# Patient Record
Sex: FEMALE | Race: WHITE | Employment: OTHER | URBAN - METROPOLITAN AREA
[De-identification: names, ages, dates, MRNs, and addresses within clinical notes are randomized per-mention and may not be internally consistent; named-entity substitution may affect disease eponyms.]

---

## 2018-02-02 ENCOUNTER — HOSPITAL ENCOUNTER (OUTPATIENT)
Dept: GENERAL RADIOLOGY | Age: 58
Discharge: HOME | End: 2018-02-02
Payer: MEDICARE

## 2018-02-02 DIAGNOSIS — F17.200 TOBACCO USE DISORDER: ICD-10-CM

## 2018-02-02 PROCEDURE — 71046 X-RAY EXAM CHEST 2 VIEWS: CPT

## 2019-11-22 ENCOUNTER — HOSPITAL ENCOUNTER (OUTPATIENT)
Dept: GENERAL RADIOLOGY | Age: 59
Discharge: HOME | End: 2019-11-22
Payer: MEDICARE

## 2019-11-22 DIAGNOSIS — J45.41 MODERATE PERSISTENT ASTHMA WITH EXACERBATION: ICD-10-CM

## 2019-11-22 PROCEDURE — 71046 X-RAY EXAM CHEST 2 VIEWS: CPT

## 2019-12-12 ENCOUNTER — HOSPITAL ENCOUNTER (OUTPATIENT)
Dept: NON INVASIVE DIAGNOSTICS | Age: 59
Discharge: HOME | End: 2019-12-12
Attending: NURSE PRACTITIONER
Payer: MEDICARE

## 2019-12-12 ENCOUNTER — HOSPITAL ENCOUNTER (OUTPATIENT)
Dept: CT IMAGING | Age: 59
Discharge: HOME | End: 2019-12-12
Attending: NURSE PRACTITIONER
Payer: MEDICARE

## 2019-12-12 VITALS
BODY MASS INDEX: 30.21 KG/M2 | HEIGHT: 61 IN | SYSTOLIC BLOOD PRESSURE: 128 MMHG | DIASTOLIC BLOOD PRESSURE: 60 MMHG | WEIGHT: 160 LBS

## 2019-12-12 DIAGNOSIS — R06.09 DYSPNEA ON MINIMAL EXERTION: ICD-10-CM

## 2019-12-12 LAB
CREAT UR-MCNC: 1.4 MG/DL (ref 0.6–1.3)
ECHO AO ROOT DIAM: 2.98 CM
ECHO LA AREA 4C: 19.8 CM2
ECHO LA VOL 2C: 30.47 ML (ref 22–52)
ECHO LA VOL 4C: 52.75 ML (ref 22–52)
ECHO LA VOL BP: 47.02 ML (ref 22–52)
ECHO LA VOL/BSA BIPLANE: 27.37 ML/M2 (ref 16–28)
ECHO LA VOLUME INDEX A2C: 17.73 ML/M2 (ref 16–28)
ECHO LA VOLUME INDEX A4C: 30.7 ML/M2 (ref 16–28)
ECHO LV EDV TEICHHOLZ: 0.66 ML
ECHO LV ESV TEICHHOLZ: 0.23 ML
ECHO LV INTERNAL DIMENSION DIASTOLIC: 4.71 CM (ref 3.9–5.3)
ECHO LV INTERNAL DIMENSION SYSTOLIC: 3.03 CM
ECHO LV IVSD: 1.28 CM (ref 0.6–0.9)
ECHO LV MASS 2D: 272.5 G (ref 67–162)
ECHO LV MASS INDEX 2D: 158.6 G/M2 (ref 43–95)
ECHO LV POSTERIOR WALL DIASTOLIC: 1.25 CM (ref 0.6–0.9)
ECHO LVOT DIAM: 1.92 CM
ECHO LVOT PEAK GRADIENT: 4.5 MMHG
ECHO LVOT PEAK VELOCITY: 106 CM/S
ECHO LVOT VTI: 22.24 CM
ECHO MV A VELOCITY: 64.62 CM/S
ECHO MV E DECELERATION TIME (DT): 161.6 MS
ECHO MV E VELOCITY: 76.7 CM/S
ECHO MV E/A RATIO: 1.19
LVFS 2D: 35.74 %
LVOT MG: 2.25 MMHG
LVOT MV: 0.7 CM/S
LVSV (TEICH): 37.98 ML
MV DEC SLOPE: 4.75

## 2019-12-12 PROCEDURE — 82565 ASSAY OF CREATININE: CPT

## 2019-12-12 PROCEDURE — 71260 CT THORAX DX C+: CPT

## 2019-12-12 PROCEDURE — 93306 TTE W/DOPPLER COMPLETE: CPT

## 2019-12-12 PROCEDURE — 74011636320 HC RX REV CODE- 636/320

## 2019-12-12 PROCEDURE — 71270 CT THORAX DX C-/C+: CPT

## 2019-12-12 RX ADMIN — IOPAMIDOL 60 ML: 612 INJECTION, SOLUTION INTRAVENOUS at 13:40

## 2019-12-16 ENCOUNTER — HOSPITAL ENCOUNTER (OUTPATIENT)
Dept: RESPIRATORY THERAPY | Age: 59
Discharge: HOME | End: 2019-12-16
Attending: NURSE PRACTITIONER
Payer: MEDICARE

## 2019-12-16 DIAGNOSIS — R06.09 DYSPNEA ON MINIMAL EXERTION: ICD-10-CM

## 2019-12-16 PROCEDURE — 94060 EVALUATION OF WHEEZING: CPT

## 2019-12-16 PROCEDURE — 94726 PLETHYSMOGRAPHY LUNG VOLUMES: CPT

## 2019-12-16 PROCEDURE — 94729 DIFFUSING CAPACITY: CPT

## 2020-03-16 RX ORDER — MECLIZINE HYDROCHLORIDE 25 MG/1
25 TABLET ORAL
COMMUNITY
End: 2021-06-24

## 2020-03-16 RX ORDER — PREDNISONE 10 MG/1
TABLET ORAL
COMMUNITY
End: 2020-10-06 | Stop reason: ALTCHOICE

## 2020-03-16 RX ORDER — QUETIAPINE FUMARATE 100 MG/1
200 TABLET, FILM COATED ORAL DAILY
COMMUNITY
End: 2021-06-24

## 2020-03-16 RX ORDER — TRAZODONE HYDROCHLORIDE 100 MG/1
200 TABLET ORAL
COMMUNITY
End: 2021-06-24

## 2020-03-16 RX ORDER — VENLAFAXINE 75 MG/1
150 TABLET ORAL DAILY
COMMUNITY
End: 2021-06-24

## 2020-03-16 RX ORDER — ALBUTEROL SULFATE 90 UG/1
2 AEROSOL, METERED RESPIRATORY (INHALATION)
COMMUNITY
End: 2021-06-24

## 2020-03-16 RX ORDER — BUDESONIDE AND FORMOTEROL FUMARATE DIHYDRATE 160; 4.5 UG/1; UG/1
2 AEROSOL RESPIRATORY (INHALATION) DAILY
COMMUNITY
End: 2021-06-24

## 2020-03-16 RX ORDER — DULOXETIN HYDROCHLORIDE 60 MG/1
60 CAPSULE, DELAYED RELEASE ORAL DAILY
COMMUNITY
End: 2021-06-24

## 2020-03-16 RX ORDER — ATORVASTATIN CALCIUM 80 MG/1
80 TABLET, FILM COATED ORAL DAILY
COMMUNITY
Start: 2017-07-14 | End: 2021-06-24

## 2020-03-16 RX ORDER — LAMOTRIGINE 200 MG/1
200 TABLET ORAL 2 TIMES DAILY
Status: ON HOLD | COMMUNITY
End: 2021-06-24 | Stop reason: SDUPTHER

## 2020-03-17 ENCOUNTER — OFFICE VISIT (OUTPATIENT)
Dept: PULMONOLOGY | Age: 60
End: 2020-03-17

## 2020-03-17 VITALS
TEMPERATURE: 98.2 F | HEIGHT: 61 IN | WEIGHT: 190 LBS | BODY MASS INDEX: 35.87 KG/M2 | DIASTOLIC BLOOD PRESSURE: 70 MMHG | RESPIRATION RATE: 18 BRPM | HEART RATE: 76 BPM | SYSTOLIC BLOOD PRESSURE: 110 MMHG | OXYGEN SATURATION: 93 %

## 2020-03-17 DIAGNOSIS — E66.01 SEVERE OBESITY (HCC): ICD-10-CM

## 2020-03-17 NOTE — LETTER
3/23/20 Patient: Guy Fam YOB: 1960 Date of Visit: 3/17/2020 Carey Tobar NP 
[address] [place] [address] [address] [place] [address] [place] [address] VIA Facsimile: [phone] Dear Carye Tobar NP, Thank you for referring Ms. Vasyl Orozco to [address] [place] [address] for evaluation. My notes for this consultation are attached. If you have questions, please do not hesitate to call me. I look forward to following your patient along with you.  
 
 
Sincerely, 
 
Anny Elizabeth MD

## 2020-03-17 NOTE — PROGRESS NOTES
[facility]  Pulmonary, Critical Care, and Sleep Medicine      Pulmonary Office Initial Consultation    Name: Sylvia Espinoza     : 1960     Date: 3/17/2020        Subjective:   Patient has been referred for evaluation of: Chronic cough and shortness of breath. Patient is a 61 y.o. female who states that for the past 6 months she has been experiencing symptoms of shortness of breath both at rest and with walking. Occasionally she has even found difficulty getting up in her sleep and going to the bathroom because she gets short of breath. Previously she was able to walk 1 mile now she is barely able to walk half a mile and has to pace herself and rest.  In addition she has been having cough which she describes as productive of white mucus to occasionally dry. The cough started around the same time as the shortness of breath and is intermittent but  consistently on a daily basis. Cough seems to get worse when she lays down. She also has had some hoarseness and wheezing associated with above symptoms. She reports that only change in her medical condition has been changing her dose of Seroquel and Paxil which have been increased  She denies chest pain, fever, chills, night sweats dyspepsia, reflux. Denies any pedal edema  Denies any dysphagia  Denies any change in her weight  Denies postnasal drip  Denies being diagnosed with sleep apnea  Comorbid conditions include- DM, HTN, diagnosis of bipolar affective disorder, stage III chronic kidney disease, hypothyroidism    Smoking status: Current everyday smoker smoking 1 pack every 3 days    No e-cigarettes or vaping.   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, coal mining, textile industry work, construction work       Review of data: I have reviewed records from her primary care physician-office notes, results of previous testing including echocardiogram    Past Medical History:   Diagnosis Date    Asthma, chronic, moderate persistent, with acute exacerbation     Bipolar 1 disorder, manic, mild ([address]     CAD (coronary artery disease)     Controlled type 2 diabetes mellitus without complication, without long-term current use of insulin ([address]     Diabetes ([address]     Hyperlipidemia     Hypertension     Hypothyroidism     Liver disease        Past Surgical History:   Procedure Laterality Date    HX APPENDECTOMY  10/1969    HX BACK SURGERY       neck& back surgery    HX CERVICAL LAMINECTOMY      HX CYST REMOVAL Left     Baker's    HX HYSTERECTOMY Bilateral 10/1990    HX KNEE ARTHROSCOPY Left     torn cartilage x 2       Social History     Socioeconomic History    Marital status:      Spouse name: Not on file    Number of children: Not on file    Years of education: Not on file    Highest education level: Not on file   Tobacco Use    Smoking status: Current Every Day Smoker     Packs/day: 1.00     Types: Cigarettes     Start date: 6/17/1974   Substance and Sexual Activity    Alcohol use: Never     Frequency: Never    Drug use: Not Currently     Types: Marijuana     Comment: as a teenager       Family History   Problem Relation Age of Onset    Cancer Mother     Heart Disease Father        Allergies   Allergen Reactions    Codeine Nausea and Vomiting    Other Medication Other (comments)     Nicotine Patches - Serious \"burn like\" irritation on skin    Tylox [Oxycodone-Acetaminophen] Nausea and Vomiting    Celexa [Citalopram] Unknown (comments)     violent    Flu Vaccine Qs2014-15(36mo,Up) Nausea Only       .   Current Outpatient Medications   Medication Sig Dispense Refill    albuterol (PROVENTIL HFA, VENTOLIN HFA, PROAIR HFA) 90 mcg/actuation inhaler Take 2 Puffs by inhalation.  aspirin-calcium carbonate 81 mg-300 mg calcium(777 mg) tab Take 81 mg by mouth.  atorvastatin (LIPITOR) 80 mg tablet Take 80 mg by mouth.  glucose blood VI test strips (ASCENSIA AUTODISC VI, ONE TOUCH ULTRA TEST VI) strip Test once a day for DX E11.9      budesonide-formoteroL (SYMBICORT) 160-4.5 mcg/actuation HFAA Take 2 Puffs by inhalation.  DULoxetine (CYMBALTA) 60 mg capsule Take 60 mg by mouth.  lamoTRIgine (LaMICtal) 200 mg tablet Take 200 mg by mouth two (2) times a day.  meclizine (ANTIVERT) 25 mg tablet Take 25 mg by mouth.  QUEtiapine (SEROquel) 100 mg tablet Take 100 mg by mouth daily.  traZODone (DESYREL) 100 mg tablet Take 200 mg by mouth nightly.  venlafaxine (EFFEXOR) 75 mg tablet Take 75 mg by mouth daily.  levothyroxine (SYNTHROID) 75 mcg tablet Take 75 mcg by mouth Daily (before breakfast). Indications: HYPOTHYROIDISM      lisinopril (PRINIVIL, ZESTRIL) 5 mg tablet Take 5 mg by mouth daily. Indications: HYPERTENSION      lovastatin (MEVACOR) 10 mg tablet Take 10 mg by mouth daily (after breakfast). Indications: HYPERCHOLESTEROLEMIA      predniSONE (STERAPRED DS) 10 mg dose pack Take  by mouth.  cyclobenzaprine (FLEXERIL) 10 mg tablet Take 10 mg by mouth two (2) times a day. Indications:  MUSCLE SPASM       Review of Systems:  HEENT: No epistaxis, no nasal drainage, no difficulty in swallowing, no redness in eyes  Respiratory: as above  Cardiovascular: no chest pain, no palpitations, no chronic leg edema, no syncope  Gastrointestinal: no abd pain, no vomiting, no diarrhea, no bleeding symptoms  Genitourinary: No urinary symptoms or hematuria  Integument/breast: No ulcers or rashes  Musculoskeletal:Neg  Neurological: No focal weakness, no seizures, no headaches  Behvioral/Psych: No anxiety, no depression  Constitutional: No fever, no chills, no weight loss, no night sweats     Objective:     Visit Vitals  /70 (BP 1 Location: Left arm, BP Patient Position: Sitting)   Pulse 76   Temp 98.2 °F (36.8 °C)   Resp 18   Ht 5' 1\" (1.549 m)   Wt 86.2 kg (190 lb)   SpO2 93%   BMI 35.90 kg/m²        Physical Exam:   General: comfortable, no acute distress  HEENT: pupils reactive, sclera anicteric, EOM intact  Neck: No adenopathy or thyroid swelling, no lymphadenopathy or JVD, supple  CVS: S1S2 no murmurs  RS: Mod AE bilaterally, no tactile fremitus or egophony, no accessory muscle use  Abd: soft, non tender, no hepatosplenomegaly  Neuro: non focal, awake, alert  Extrm: no leg edema, clubbing or cyanosis  Skin: no rash    Data review:   Pertinent labs: CBC, BMP, LFT's      PFT:  12/18/2019-normal spirometry  Mild air trapping  Normal diffusion capacity    No results found for this or any previous visit. Imaging:  I have personally reviewed the patients radiographs and have reviewed the reports:  XR Results (most recent):  Results from Hospital Encounter encounter on 11/22/19   XR CHEST PA LAT    Narrative CHEST, PA AND LATERAL    CPT CODE: 50871    INDICATION: Above. Asthma exacerbation. COMPARISON: 2/2/2018. TECHNIQUE: PA and lateral chest radiographs are reviewed. FINDINGS:    The lungs appear clear without  evidence of focal pulmonary infiltrate,  pulmonary edema or pleural effusion. The cardiomediastinal contours are within  normal limits. Cervical spine fusion plate/screws again noted. Surgical clips  again project over the right upper quadrant medially. Impression IMPRESSION:    No evidence of acute pulmonary disease.          CT Results (most recent):  Results from Hospital Encounter encounter on 12/12/19   CT CHEST W CONT    Narrative EXAM:  CT Chest with Contrast.             CLINICAL INDICATION:  Persistent cough, dyspnea, dyspnea with minimal exertion. COMPARISON:  No prior CTs available. TECHNIQUE:      - Helically scanned volumetric axial sections of the chest are obtained  following IV contrast administration. Coronal and sagittal multiplanar  reconstruction images are generated for improved anatomic delineation.  - Contrast dose of 60 mL Isovue-300  - Radiation dose optimization techniques are utilized as appropriate to the  exam, with combination of automated exposure control, adjustment of the mA  and/or kV according to patient's size (Including appropriate matching for  site-specific examinations), or use of iterative reconstruction technique. FINDINGS:     Lungs:  No dominant lung mass or suspicious lung nodule is detected in both  lungs. No infiltrate or consolidation is identified. Pleura:  No significant pleural effusion is detected bilaterally. Mediastinum, Catie:  No adenopathy. Base of Neck, Axillae:  No adenopathy. Esophagus:  Mild esophageal dilation. Abdomen:  No acute abnormalities in the visualized portions of the upper  abdomen. Bones:  No acute findings. Impression IMPRESSION:              No acute airspace disease. No dominant mass or suspicious lung nodule. .     12/12/19   ECHO ADULT COMPLETE 12/12/2019 12/12/2019    Narrative · Left Ventricle: Normal cavity size and systolic function (ejection   fraction normal). Mild concentric hypertrophy. Estimated left ventricular   ejection fraction is 61 - 65%. No regional wall motion abnormality noted. Age-appropriate left ventricular diastolic function. · Mitral Valve: Mild mitral valve regurgitation is present.         Signed by: Marquez Finney MD       Patient Active Problem List   Diagnosis Code    Severe obesity ([facility] E66.01       IMPRESSION:   · Dyspnea on exertion-likely related to mild obstructive airway disease-chronic asthmatic bronchitis as evidenced by air trapping  · Moderate persistent asthma with exacerbation  · Hypothyroidism  · Bipolar affective disorder  · Obesity with deconditioning      RECOMMENDATIONS:   · Continue to optimize treatment-continue Symbicort with albuterol  · Will add Spiriva Respimat  · Discussed need for airway clearance measures with patient  · Will monitor response to above and make further recommendations for further treatment adjustments  · Patient has been instructed to pace herself and incrementally increase her exercise ability  · Complete cessation of cigarette smoking  · We will follow-up in 3 to 4 months     Health maintenance screens deferred to Primary care provider.      Casandra Orosco MD

## 2020-03-17 NOTE — PATIENT INSTRUCTIONS
Stopping Smoking: Care Instructions  Your Care Instructions  Cigarette smokers crave the nicotine in cigarettes. Giving it up is much harder than simply changing a habit. Your body has to stop craving the nicotine. It is hard to quit, but you can do it. There are many tools that people use to quit smoking. You may find that combining tools works best for you. There are several steps to quitting. First you get ready to quit. Then you get support to help you. After that, you learn new skills and behaviors to become a nonsmoker. For many people, a necessary step is getting and using medicine. Your doctor will help you set up the plan that best meets your needs. You may want to attend a smoking cessation program to help you quit smoking. When you choose a program, look for one that has proven success. Ask your doctor for ideas. You will greatly increase your chances of success if you take medicine as well as get counseling or join a cessation program.  Some of the changes you feel when you first quit tobacco are uncomfortable. Your body will miss the nicotine at first, and you may feel short-tempered and grumpy. You may have trouble sleeping or concentrating. Medicine can help you deal with these symptoms. You may struggle with changing your smoking habits and rituals. The last step is the tricky one: Be prepared for the smoking urge to continue for a time. This is a lot to deal with, but keep at it. You will feel better. Follow-up care is a key part of your treatment and safety. Be sure to make and go to all appointments, and call your doctor if you are having problems. It's also a good idea to know your test results and keep a list of the medicines you take. How can you care for yourself at home? · Ask your family, friends, and coworkers for support. You have a better chance of quitting if you have help and support.   · Join a support group, such as Nicotine Anonymous, for people who are trying to quit smoking. · Consider signing up for a smoking cessation program, such as the American Lung Association's Freedom from Smoking program.  · Get text messaging support. Go to the website at www.smokefree. gov to sign up for the [facility] program.  · Set a quit date. Pick your date carefully so that it is not right in the middle of a big deadline or stressful time. Once you quit, do not even take a puff. Get rid of all ashtrays and lighters after your last cigarette. Clean your house and your clothes so that they do not smell of smoke. · Learn how to be a nonsmoker. Think about ways you can avoid those things that make you reach for a cigarette. ? Avoid situations that put you at greatest risk for smoking. For some people, it is hard to have a drink with friends without smoking. For others, they might skip a coffee break with coworkers who smoke. ? Change your daily routine. Take a different route to work or eat a meal in a different place. · Cut down on stress. Calm yourself or release tension by doing an activity you enjoy, such as reading a book, taking a hot bath, or gardening. · Talk to your doctor or pharmacist about nicotine replacement therapy, which replaces the nicotine in your body. You still get nicotine but you do not use tobacco. Nicotine replacement products help you slowly reduce the amount of nicotine you need. These products come in several forms, many of them available over-the-counter:  ? Nicotine patches  ? Nicotine gum and lozenges  ? Nicotine inhaler  · Ask your doctor about bupropion (Wellbutrin) or varenicline (Chantix), which are prescription medicines. They do not contain nicotine. They help you by reducing withdrawal symptoms, such as stress and anxiety. · Some people find hypnosis, acupuncture, and massage helpful for ending the smoking habit. · Eat a healthy diet and get regular exercise. Having healthy habits will help your body move past its craving for nicotine.   · Be prepared to keep trying. Most people are not successful the first few times they try to quit. Do not get mad at yourself if you smoke again. Make a list of things you learned and think about when you want to try again, such as next week, next month, or next year. Where can you learn more? Go to http://roberto-jaycob.info/  Enter [id number] in the search box to learn more about \"Stopping Smoking: Care Instructions. \"  Current as of: July 4, 2019Content Version: 12.4  © 7751-7077 [employer]. Care instructions adapted under license by [employer] (which disclaims liability or warranty for this information). If you have questions about a medical condition or this instruction, always ask your healthcare professional. [address] any warranty or liability for your use of this information. COPD and Asthma: Care Instructions  Your Care Instructions    Some people who have chronic obstructive pulmonary disease (COPD) also have asthma. Both of these problems can damage your lungs. This makes it very important to control them. Asthma causes the airways that lead to the lungs to swell and become narrow. This makes it hard to breathe. You may wheeze or cough. If you have a bad attack, you may need emergency care. There are two parts to treating asthma. · Controlling asthma over the long term. · Treating attacks when they occur. You and your doctor can make an asthma treatment plan that will help. This plan tells you the medicines you take every day to reduce the swelling in your airways and prevent attacks. It also tells you what to do if you have an asthma attack. Follow-up care is a key part of your treatment and safety. Be sure to make and go to all appointments, and call your doctor if you are having problems. It's also a good idea to know your test results and keep a list of the medicines you take. How can you care for yourself at home?   To control asthma over the long term  Medicines  Controller medicines reduce swelling in your lungs. They also prevent asthma attacks. Take your controller medicine exactly as prescribed. Talk to your doctor if you have any problems with your medicine. · Inhaled corticosteroid is a common and effective controller medicine. Using it the right way can prevent or reduce most side effects. · Take your controller medicine every day, not just when you have symptoms. This helps prevent problems before they occur. · Always bring your asthma medicine with you when you travel. · Your doctor may prescribe long-acting medicine that combines a corticosteroid with a beta2-agonist. Follow your doctor's instructions exactly about how to take a long-acting medicine. Examples include:  ? Fluticasone and salmeterol (Advair). ? Budesonide and formoterol (Symbicort). · Do not depend on your controller medicines to stop an asthma attack that has already started. They do not work fast enough to help. · Your doctor may also prescribe anticholinergic inhalers. These include ipratropium (Atrovent) and tiotropium (Spiriva). Education  · Learn what sets off an asthma attack. Avoid these triggers when you can. Common triggers include smoke, air pollution, pollen, animal dander, colds, stress, and cold air. · Do not smoke. Smoking can make COPD and asthma worse. If you need help quitting, talk to your doctor about stop-smoking programs and medicines. These can increase your chances of quitting for good. · Check yourself for symptoms to know which step to follow in your action plan. Watch for things like being short of breath, having chest tightness, coughing, and wheezing. Also notice if symptoms wake you up at night or if you get tired quickly when you exercise. · You may want to learn how to use a peak flow meter. This measures how open your airways are. It may help you know when you will have an asthma attack.   To treat attacks when they occur  Use your asthma action plan when you have an attack. Your quick-relief medicine, such as albuterol, will stop an asthma attack. It relaxes the muscles that get tight around the airways. · Take your quick-relief medicine exactly as prescribed. Talk with your doctor if you have any problems with your medicine. · Keep this medicine with you at all times. · You may need to use this medicine before you exercise. If your doctor prescribed corticosteroid pills to use during an attack, take them as directed. They may take hours to work, but they may shorten the attack and help you breathe better. When should you call for help? Call 911 anytime you think you may need emergency care. For example, call if:    · You have severe trouble breathing.    Call your doctor now or seek immediate medical care if:    · You have new or worse shortness of breath.     · You are coughing more deeply or more often, especially if you notice more mucus or a change in the color of your mucus.     · You cough up blood.     · You have new or increased swelling in your legs or belly.     · You have a fever.     · You have used your quick-relief medicine but you are still short of breath.    Watch closely for changes in your health, and be sure to contact your doctor if you have any problems. Where can you learn more? Go to http://roberto-jaycob.info/  Enter A350 in the search box to learn more about \"COPD and Asthma: Care Instructions. \"  Current as of: June 9, 2019Content Version: 12.4  © 0606-4375 [employer]. Care instructions adapted under license by [employer] (which disclaims liability or warranty for this information). If you have questions about a medical condition or this instruction, always ask your healthcare professional. [place] [address] any warranty or liability for your use of this information.

## 2020-03-31 ENCOUNTER — TELEPHONE (OUTPATIENT)
Dept: PULMONOLOGY | Age: 60
End: 2020-03-31

## 2020-03-31 NOTE — TELEPHONE ENCOUNTER
Pt states she has not seen a lot of difference yet in adding Spirva. Was taken 1 1/2 weeks.  Pt needs rx to be sent in to [facility]

## 2020-09-30 PROBLEM — S61.218A: Status: ACTIVE | Noted: 2020-09-22

## 2020-09-30 PROBLEM — E11.9 DIET-CONTROLLED TYPE 2 DIABETES MELLITUS (HCC): Status: ACTIVE | Noted: 2019-12-01

## 2020-09-30 PROBLEM — N18.30 CHRONIC KIDNEY DISEASE, STAGE 3 (MODERATE): Status: ACTIVE | Noted: 2019-12-01

## 2020-09-30 PROBLEM — F17.200 SMOKER: Status: ACTIVE | Noted: 2020-01-06

## 2020-09-30 PROBLEM — E53.8 B12 DEFICIENCY: Status: ACTIVE | Noted: 2020-09-27

## 2020-09-30 PROBLEM — R29.6 FREQUENT FALLS: Status: ACTIVE | Noted: 2020-09-27

## 2020-09-30 PROBLEM — S63.259A: Status: ACTIVE | Noted: 2020-09-22

## 2020-09-30 RX ORDER — ROSUVASTATIN CALCIUM 20 MG/1
20 TABLET, COATED ORAL DAILY
COMMUNITY
Start: 2020-08-15 | End: 2021-06-24

## 2020-09-30 RX ORDER — LANOLIN ALCOHOL/MO/W.PET/CERES
1000 CREAM (GRAM) TOPICAL DAILY
COMMUNITY
Start: 2020-09-28 | End: 2021-06-24

## 2020-10-06 ENCOUNTER — OFFICE VISIT (OUTPATIENT)
Dept: PULMONOLOGY | Age: 60
End: 2020-10-06
Payer: MEDICARE

## 2020-10-06 VITALS
WEIGHT: 183.6 LBS | OXYGEN SATURATION: 95 % | RESPIRATION RATE: 18 BRPM | BODY MASS INDEX: 34.66 KG/M2 | DIASTOLIC BLOOD PRESSURE: 63 MMHG | HEART RATE: 79 BPM | HEIGHT: 61 IN | TEMPERATURE: 98 F | SYSTOLIC BLOOD PRESSURE: 129 MMHG

## 2020-10-06 DIAGNOSIS — J45.20 MILD INTERMITTENT ASTHMATIC BRONCHITIS WITHOUT COMPLICATION: Primary | ICD-10-CM

## 2020-10-06 DIAGNOSIS — F17.200 SMOKER: ICD-10-CM

## 2020-10-06 DIAGNOSIS — Z72.0 TOBACCO USE: ICD-10-CM

## 2020-10-06 PROBLEM — J45.909 ASTHMATIC BRONCHITIS WITHOUT COMPLICATION: Status: ACTIVE | Noted: 2020-10-06

## 2020-10-06 PROCEDURE — G8752 SYS BP LESS 140: HCPCS | Performed by: INTERNAL MEDICINE

## 2020-10-06 PROCEDURE — G9899 SCRN MAM PERF RSLTS DOC: HCPCS | Performed by: INTERNAL MEDICINE

## 2020-10-06 PROCEDURE — G8427 DOCREV CUR MEDS BY ELIG CLIN: HCPCS | Performed by: INTERNAL MEDICINE

## 2020-10-06 PROCEDURE — 99214 OFFICE O/P EST MOD 30 MIN: CPT | Performed by: INTERNAL MEDICINE

## 2020-10-06 PROCEDURE — 3017F COLORECTAL CA SCREEN DOC REV: CPT | Performed by: INTERNAL MEDICINE

## 2020-10-06 PROCEDURE — G8432 DEP SCR NOT DOC, RNG: HCPCS | Performed by: INTERNAL MEDICINE

## 2020-10-06 PROCEDURE — G8754 DIAS BP LESS 90: HCPCS | Performed by: INTERNAL MEDICINE

## 2020-10-06 PROCEDURE — G8417 CALC BMI ABV UP PARAM F/U: HCPCS | Performed by: INTERNAL MEDICINE

## 2020-10-06 RX ORDER — ALBUTEROL SULFATE 90 UG/1
1 AEROSOL, METERED RESPIRATORY (INHALATION)
Qty: 1 INHALER | Refills: 3 | Status: SHIPPED | OUTPATIENT
Start: 2020-10-06

## 2020-10-06 RX ORDER — CLINDAMYCIN HYDROCHLORIDE 300 MG/1
300 CAPSULE ORAL 4 TIMES DAILY
COMMUNITY
Start: 2020-10-03 | End: 2020-10-13

## 2020-10-06 NOTE — LETTER
10/6/20 Patient: Dimitrios Bo YOB: 1960 Date of Visit: 10/6/2020 Sukhwinder Velásquez NP 
[address] [place] [address] [address] [place] [id number] VIA Facsimile: [phone] Dear Sukhwinder Velásquez NP, Thank you for referring Ms. Royal Aguilar to [address] [place] [address] for evaluation. My notes for this consultation are attached. If you have questions, please do not hesitate to call me. I look forward to following your patient along with you.  
 
 
Sincerely, 
 
Camelia Ronquillo MD

## 2020-10-06 NOTE — PROGRESS NOTES
Mirza Velazquez presents today for   Chief Complaint   Patient presents with    Asthma     follow up form 3/17/2020    Cough    Breathing Problem     MENDEZ    Results     Echo  9/13/2020 Alonso Mahoney)       Is someone accompanying this pt? No    Is the patient using any DME equipment during OV? No    -DME Company N/A    Depression Screening:  3 most recent PHQ Screens 10/6/2020   PHQ Not Done Active Diagnosis of Depression or Bipolar Disorder       Learning Assessment:  Learning Assessment 3/17/2020   PRIMARY LEARNER Patient   HIGHEST LEVEL OF EDUCATION - PRIMARY LEARNER  > 4 YEARS OF COLLEGE   BARRIERS PRIMARY LEARNER NONE   CO-LEARNER CAREGIVER No   PRIMARY LANGUAGE ENGLISH    NEED No   LEARNER PREFERENCE PRIMARY DEMONSTRATION     LISTENING   ANSWERED BY patient   RELATIONSHIP SELF       Abuse Screening:  No flowsheet data found. Fall Risk  No flowsheet data found. Coordination of Care:  1. Have you been to the ER, urgent care clinic since your last visit? Hospitalized since your last visit? Yes; [facility]: [facility], When: 5/17/2020-Vomiting, 9/22-9/27/2020-hypothyroidism, fall & hand laceration & 10/3/2020-infected lip laceation    2. Have you seen or consulted any other health care providers outside of the [address] [place] [address] since your last visit? Include any pap smears or colon screening. Yes.  NP Natalio Todd, PCP

## 2020-10-06 NOTE — PROGRESS NOTES
[facility]  Pulmonary, Critical Care, and Sleep Medicine      Pulmonary Office visit    Name: Tara Franco     : 1960     Date: 10/6/2020        Subjective:   Patient has been referred for evaluation of: Chronic cough and shortness of breath. 10/06/20   Patient states that she continues to have persistent cough-usually dry all day long. She admits to smoking one third to half pack of cigarettes per day. She has been using Symbicort and Spiriva daily. Asking for a refill on albuterol. More recently she has had some problems with her lower extremities getting weak and had a thoracic spine MRI which showed T6-T7 herniated disc. In addition she fell and has lacerated and hurt her hand and is currently in need for a tendon repair surgery. She denies any fever chills night sweats  Denies any chest pain  Has not had any COVID exposure that she knows of. HPI  Patient is a 61 y.o. female who states that for the past 6 months she has been experiencing symptoms of shortness of breath both at rest and with walking. Occasionally she has even found difficulty getting up in her sleep and going to the bathroom because she gets short of breath. Previously she was able to walk 1 mile now she is barely able to walk half a mile and has to pace herself and rest.  In addition she has been having cough which she describes as productive of white mucus to occasionally dry. The cough started around the same time as the shortness of breath and is intermittent but  consistently on a daily basis. Cough seems to get worse when she lays down. She also has had some hoarseness and wheezing associated with above symptoms. She reports that only change in her medical condition has been changing her dose of Seroquel and Paxil which have been increased  She denies chest pain, fever, chills, night sweats dyspepsia, reflux.   Denies any pedal edema  Denies any dysphagia  Denies any change in her weight  Denies postnasal drip  Denies being diagnosed with sleep apnea  Comorbid conditions include- DM, HTN, diagnosis of bipolar affective disorder, stage III chronic kidney disease, hypothyroidism    Smoking status: Current everyday smoker smoking 1 pack every 3 days    No e-cigarettes or vaping.   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, coal mining, textile industry work, construction work       Review of data: I have reviewed records from her primary care physician-office notes, results of previous testing including echocardiogram    Past Medical History:   Diagnosis Date    Asthma, chronic, moderate persistent, with acute exacerbation     Asthmatic bronchitis without complication 67/8/7245    Bipolar 1 disorder, manic, mild ([address]     CAD (coronary artery disease)     Controlled type 2 diabetes mellitus without complication, without long-term current use of insulin ([facility] [address]     Diabetes ([facility] [address]     Hyperlipidemia     Hypertension     Hypothyroidism     Liver disease        Past Surgical History:   Procedure Laterality Date    HX APPENDECTOMY  10/1969    HX BACK SURGERY       neck& back surgery    HX CERVICAL LAMINECTOMY      HX CYST REMOVAL Left     [facility]    HX HYSTERECTOMY Bilateral 10/1990    HX KNEE ARTHROSCOPY Left     torn cartilage x 2     Allergies   Allergen Reactions    Codeine Nausea and Vomiting    Other Medication Other (comments)     Nicotine Patches - Serious \"burn like\" irritation on skin    Tylox [Oxycodone-Acetaminophen] Nausea and Vomiting    Celexa [Citalopram] Unknown (comments) violent    Flu Vaccine Qs2014-15(36mo,Up) Nausea Only     . Current Outpatient Medications   Medication Sig Dispense Refill    clindamycin (CLEOCIN) 300 mg capsule Take 300 mg by mouth four (4) times daily.  rosuvastatin (CRESTOR) 20 mg tablet Take 20 mg by mouth daily.  cyanocobalamin 1,000 mcg tablet Take 1,000 mcg by mouth daily.  tiotropium bromide (Spiriva Respimat) 2.5 mcg/actuation inhaler Take 2 Puffs by inhalation daily. 1 Inhaler 5    albuterol (PROVENTIL HFA, VENTOLIN HFA, PROAIR HFA) 90 mcg/actuation inhaler Take 2 Puffs by inhalation every four (4) hours as needed.  aspirin-calcium carbonate 81 mg-300 mg calcium(777 mg) tab Take 81 mg by mouth daily.  atorvastatin (LIPITOR) 80 mg tablet Take 80 mg by mouth daily.  glucose blood VI test strips (ASCENSIA AUTODISC VI, ONE TOUCH ULTRA TEST VI) strip Test once a day for DX E11.9      DULoxetine (CYMBALTA) 60 mg capsule Take 60 mg by mouth daily.  lamoTRIgine (LaMICtal) 200 mg tablet Take 200 mg by mouth two (2) times a day.  meclizine (ANTIVERT) 25 mg tablet Take 25 mg by mouth daily as needed.  QUEtiapine (SEROquel) 100 mg tablet Take 200 mg by mouth daily.  traZODone (DESYREL) 100 mg tablet Take 200 mg by mouth nightly.  venlafaxine (EFFEXOR) 75 mg tablet Take 75 mg by mouth daily.  levothyroxine (SYNTHROID) 75 mcg tablet Take 75 mcg by mouth Daily (before breakfast). Indications: HYPOTHYROIDISM      lovastatin (MEVACOR) 10 mg tablet Take 10 mg by mouth daily (after breakfast). Indications: HYPERCHOLESTEROLEMIA      budesonide-formoteroL (SYMBICORT) 160-4.5 mcg/actuation HFAA Take 2 Puffs by inhalation.  cyclobenzaprine (FLEXERIL) 10 mg tablet Take 10 mg by mouth two (2) times a day. Indications: MUSCLE SPASM      lisinopril (PRINIVIL, ZESTRIL) 5 mg tablet Take 5 mg by mouth daily.     Indications: HYPERTENSION       Review of Systems:  HEENT: No epistaxis, no nasal drainage, no difficulty in swallowing, no redness in eyes  Respiratory: as above  Cardiovascular: no chest pain, no palpitations, no chronic leg edema, no syncope  Gastrointestinal: no abd pain, no vomiting, no diarrhea, no bleeding symptoms  Genitourinary: No urinary symptoms or hematuria  Integument/breast: No ulcers or rashes  Musculoskeletal:Neg  Neurological: No focal weakness, no seizures, no headaches  Behvioral/Psych: No anxiety, no depression  Constitutional: No fever, no chills, no weight loss, no night sweats     Objective:     Visit Vitals  /63 (BP 1 Location: Right arm, BP Patient Position: Sitting)   Pulse 79   Temp 98 °F (36.7 °C) (Oral)   Resp 18   Ht 5' 1\" (1.549 m)   Wt 83.3 kg (183 lb 9.6 oz)   SpO2 95%   BMI 34.69 kg/m²        Physical Exam:   General: comfortable, no acute distress  HEENT: pupils reactive, sclera anicteric, EOM intact  Neck: No adenopathy or thyroid swelling, no lymphadenopathy or JVD, supple  CVS: S1S2 no murmurs  RS: Mod AE bilaterally, no tactile fremitus or egophony, no accessory muscle use  Abd: soft, non tender, no hepatosplenomegaly  Neuro: non focal, awake, alert  Extrm: no leg edema, clubbing or cyanosis  Skin: no rash    Data review:   Pertinent labs: CBC, BMP, LFT's      PFT:  12/18/2019-normal spirometry  Mild air trapping  Normal diffusion capacity    No results found for this or any previous visit. Imaging:  I have personally reviewed the patients radiographs and have reviewed the reports:  XR Results (most recent):  Results from Hospital Encounter encounter on 11/22/19   XR CHEST PA LAT    Narrative CHEST, PA AND LATERAL    CPT CODE: 16523    INDICATION: Above. Asthma exacerbation. COMPARISON: 2/2/2018. TECHNIQUE: PA and lateral chest radiographs are reviewed. FINDINGS:    The lungs appear clear without  evidence of focal pulmonary infiltrate,  pulmonary edema or pleural effusion. The cardiomediastinal contours are within  normal limits. Cervical spine fusion plate/screws again noted. Surgical clips  again project over the right upper quadrant medially. Impression IMPRESSION:    No evidence of acute pulmonary disease. CT Results (most recent):  Results from Hospital Encounter encounter on 12/12/19   CT CHEST W CONT    Narrative EXAM:  CT Chest with Contrast.             CLINICAL INDICATION:  Persistent cough, dyspnea, dyspnea with minimal exertion. COMPARISON:  No prior CTs available. TECHNIQUE:      - Helically scanned volumetric axial sections of the chest are obtained  following IV contrast administration. Coronal and sagittal multiplanar  reconstruction images are generated for improved anatomic delineation.  - Contrast dose of 60 mL Isovue-300  - Radiation dose optimization techniques are utilized as appropriate to the  exam, with combination of automated exposure control, adjustment of the mA  and/or kV according to patient's size (Including appropriate matching for  site-specific examinations), or use of iterative reconstruction technique. FINDINGS:     Lungs:  No dominant lung mass or suspicious lung nodule is detected in both  lungs. No infiltrate or consolidation is identified. Pleura:  No significant pleural effusion is detected bilaterally. Mediastinum, Catie:  No adenopathy. Base of Neck, Axillae:  No adenopathy. Esophagus:  Mild esophageal dilation. Abdomen:  No acute abnormalities in the visualized portions of the upper  abdomen. Bones:  No acute findings. Impression IMPRESSION:              No acute airspace disease. No dominant mass or suspicious lung nodule. .   MR THORACIC SPINE W/O CONTRAST9/27/2020  [facility]  Result Impression   1. 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 No prominent cord distention. 3-6 month follow-up examination might be considered.   2. Disc protrusions consistent with herniated discs at T6-T7 and T8-T9 as described above. There is very mild cord distortion at T8-T9 without cord signal change, this would not with complete confidence correlate with a facet myelopathy. 3. No additional high-grade stenosis, intrinsic spinal cord lesion or mass, no additional abnormality that would otherwise correlate with a thoracic myelopathy. 12/12/19   ECHO ADULT COMPLETE 12/12/2019 12/12/2019    Narrative · Left Ventricle: Normal cavity size and systolic function (ejection   fraction normal). Mild concentric hypertrophy. Estimated left ventricular   ejection fraction is 61 - 65%. No regional wall motion abnormality noted. Age-appropriate left ventricular diastolic function. · Mitral Valve: Mild mitral valve regurgitation is present.         Signed by: Fannie Gee MD       Patient Active Problem List   Diagnosis Code    Severe obesity ([address] E66.01    Chronic kidney disease, stage 3 (moderate) N18.30    Diet-controlled type 2 diabetes mellitus ([address] E11.9    DM (diabetes mellitus) ([address] E11.9    Dry eyes H04.123    Glaucoma suspect H40.009    Hypertension I10    Hypothyroidism E03.9    Smoker F17.200    Tobacco use Z72.0    B12 deficiency E53.8    Dislocation, finger, initial encounter S63.259A    Frequent falls R29.6    Laceration of little finger without foreign body without damage to nail S61.218A    Asthmatic bronchitis without complication C82.017       IMPRESSION:   · Dyspnea on exertion and chronic persistent cough-likely related to mild obstructive airway disease-chronic asthmatic bronchitis as evidenced by air trapping in a patient who is a current every day smoker  · Moderate persistent asthma   · Hypothyroidism  · Bipolar affective disorder  · Obesity with deconditioning  · Herniated disks T6-7 on MRI thoracic spine 9/27/2020      RECOMMENDATIONS:   · Continue to optimize treatment-continue Symbicort , Spiriva Respimat PRN albuterol  · Discussed need for complete cessation of cigarette smoking  · Discussed need for airway clearance measures with patient  · Will monitor response to above and make further recommendations for further treatment adjustments  · Patient has been instructed to pace herself and incrementally increase her exercise ability  · Preventive vaccinations recommended-patient states she is allergic to flu shot(experienced nausea)  · We will follow-up in 3 to 4 months     Health maintenance screens deferred to Primary care provider.      Rachael Sanz MD

## 2020-10-14 ENCOUNTER — OFFICE VISIT (OUTPATIENT)
Dept: ORTHOPEDIC SURGERY | Age: 60
End: 2020-10-14
Payer: MEDICARE

## 2020-10-14 VITALS
WEIGHT: 185.6 LBS | BODY MASS INDEX: 35.04 KG/M2 | SYSTOLIC BLOOD PRESSURE: 132 MMHG | DIASTOLIC BLOOD PRESSURE: 57 MMHG | HEART RATE: 95 BPM | TEMPERATURE: 97.2 F | HEIGHT: 61 IN | RESPIRATION RATE: 15 BRPM

## 2020-10-14 DIAGNOSIS — M25.641 STIFFNESS OF RIGHT HAND JOINT: ICD-10-CM

## 2020-10-14 DIAGNOSIS — M79.641 RIGHT HAND PAIN: ICD-10-CM

## 2020-10-14 DIAGNOSIS — S63.266S: Primary | ICD-10-CM

## 2020-10-14 PROCEDURE — G9899 SCRN MAM PERF RSLTS DOC: HCPCS | Performed by: ORTHOPAEDIC SURGERY

## 2020-10-14 PROCEDURE — 3017F COLORECTAL CA SCREEN DOC REV: CPT | Performed by: ORTHOPAEDIC SURGERY

## 2020-10-14 PROCEDURE — G8417 CALC BMI ABV UP PARAM F/U: HCPCS | Performed by: ORTHOPAEDIC SURGERY

## 2020-10-14 PROCEDURE — G8432 DEP SCR NOT DOC, RNG: HCPCS | Performed by: ORTHOPAEDIC SURGERY

## 2020-10-14 PROCEDURE — G8752 SYS BP LESS 140: HCPCS | Performed by: ORTHOPAEDIC SURGERY

## 2020-10-14 PROCEDURE — G8754 DIAS BP LESS 90: HCPCS | Performed by: ORTHOPAEDIC SURGERY

## 2020-10-14 PROCEDURE — G8427 DOCREV CUR MEDS BY ELIG CLIN: HCPCS | Performed by: ORTHOPAEDIC SURGERY

## 2020-10-14 PROCEDURE — 99203 OFFICE O/P NEW LOW 30 MIN: CPT | Performed by: ORTHOPAEDIC SURGERY

## 2020-10-14 PROCEDURE — 73130 X-RAY EXAM OF HAND: CPT | Performed by: ORTHOPAEDIC SURGERY

## 2020-10-14 RX ORDER — ONDANSETRON 4 MG/1
4 TABLET, FILM COATED ORAL
COMMUNITY
End: 2021-06-24

## 2020-10-14 RX ORDER — PROMETHAZINE HYDROCHLORIDE 12.5 MG/1
TABLET ORAL
COMMUNITY
End: 2021-06-24

## 2020-10-14 RX ORDER — ASPIRIN 81 MG/1
TABLET ORAL DAILY
COMMUNITY
End: 2021-06-24

## 2020-10-14 RX ORDER — LOPERAMIDE HYDROCHLORIDE 2 MG/1
2 CAPSULE ORAL
COMMUNITY
End: 2021-06-24

## 2020-10-14 NOTE — PROGRESS NOTES
James Camarena is a 61 y.o. female right handed retiree. Worker's Compensation and legal considerations: none filed. Vitals:    10/14/20 1508   BP: (!) 132/57   Pulse: 95   Resp: 15   Temp: 97.2 °F (36.2 °C)   Weight: 185 lb 9.6 oz (84.2 kg)   Height: 5' 1\" (1.549 m)   PainSc:   3   PainLoc: Hand           Chief Complaint   Patient presents with    Hand Pain     right little finger         HPI: Patient comes in today status post right little finger MP joint dislocation 3 weeks prior. It was closed reduced and placed into a splint at that time. She also had an open wound that required just local wound care.     Date of onset: 9/22/2020    Injury: Yes: Comment: Fall onto outstretched right hand    Prior Treatment:  Yes: Comment: Closed reduction and splinting of right little finger MP joint    Numbness/ Tingling: No      ROS: Review of Systems - General ROS: negative  Psychological ROS: negative  ENT ROS: negative  Allergy and Immunology ROS: negative  Hematological and Lymphatic ROS: negative  Respiratory ROS: no cough, shortness of breath, or wheezing  Cardiovascular ROS: no chest pain or dyspnea on exertion  Gastrointestinal ROS: no abdominal pain, change in bowel habits, or black or bloody stools  Musculoskeletal ROS: positive for - joint stiffness and pain in hand - right  Neurological ROS: negative  Dermatological ROS: negative    Past Medical History:   Diagnosis Date    Asthma, chronic, moderate persistent, with acute exacerbation     Asthmatic bronchitis without complication 78/7/8437    Bipolar 1 disorder, manic, mild ([facility] [address]     CAD (coronary artery disease)     Controlled type 2 diabetes mellitus without complication, without long-term current use of insulin (HCC)     Diabetes ([facility] [address]     Hyperlipidemia     Hypertension     Hypothyroidism     Liver disease        Past Surgical History:   Procedure Laterality Date    HX APPENDECTOMY  10/1969    HX BACK SURGERY       neck& back surgery    HX CERVICAL LAMINECTOMY      HX CYST REMOVAL Left     [facility]    HX HYSTERECTOMY Bilateral 10/1990    HX KNEE ARTHROSCOPY Left     torn cartilage x 2       Current Outpatient Medications   Medication Sig Dispense Refill    aspirin delayed-release 81 mg tablet Take  by mouth daily.  ondansetron hcl (Zofran) 4 mg tablet Take 4 mg by mouth every eight (8) hours as needed for Nausea or Vomiting.  loperamide (IMODIUM) 2 mg capsule Take  by mouth.  promethazine (PHENERGAN) 12.5 mg tablet Take  by mouth every six (6) hours as needed for Nausea.  rosuvastatin (CRESTOR) 20 mg tablet Take 20 mg by mouth daily.  cyanocobalamin 1,000 mcg tablet Take 1,000 mcg by mouth daily.  tiotropium bromide (Spiriva Respimat) 2.5 mcg/actuation inhaler Take 2 Puffs by inhalation daily. 1 Inhaler 5    budesonide-formoteroL (SYMBICORT) 160-4.5 mcg/actuation HFAA Take 2 Puffs by inhalation.  lamoTRIgine (LaMICtal) 200 mg tablet Take 200 mg by mouth two (2) times a day.  meclizine (ANTIVERT) 25 mg tablet Take 25 mg by mouth daily as needed.  QUEtiapine (SEROquel) 100 mg tablet Take 200 mg by mouth daily.  venlafaxine (EFFEXOR) 75 mg tablet Take 150 mg by mouth daily.  levothyroxine (SYNTHROID) 75 mcg tablet Take 100 mcg by mouth Daily (before breakfast). Indications: a condition with low thyroid hormone levels      albuterol (PROVENTIL HFA, VENTOLIN HFA, PROAIR HFA) 90 mcg/actuation inhaler Take 1 Puff by inhalation every four (4) hours as needed for Wheezing. 1 Inhaler 3    albuterol (PROVENTIL HFA, VENTOLIN HFA, PROAIR HFA) 90 mcg/actuation inhaler Take 2 Puffs by inhalation every four (4) hours as needed.  aspirin-calcium carbonate 81 mg-300 mg calcium(777 mg) tab Take 81 mg by mouth daily.  atorvastatin (LIPITOR) 80 mg tablet Take 80 mg by mouth daily.       glucose blood VI test strips (ASCENSIA AUTODISC VI, ONE TOUCH ULTRA TEST VI) strip Test once a day for DX E11.9      DULoxetine (CYMBALTA) 60 mg capsule Take 60 mg by mouth daily.  traZODone (DESYREL) 100 mg tablet Take 200 mg by mouth nightly.  cyclobenzaprine (FLEXERIL) 10 mg tablet Take 10 mg by mouth two (2) times a day. Indications: MUSCLE SPASM      lisinopril (PRINIVIL, ZESTRIL) 5 mg tablet Take 5 mg by mouth daily. Indications: HYPERTENSION      lovastatin (MEVACOR) 10 mg tablet Take 10 mg by mouth daily (after breakfast). Indications: HYPERCHOLESTEROLEMIA         Allergies   Allergen Reactions    Codeine Nausea and Vomiting    Other Medication Other (comments)     Nicotine Patches - Serious \"burn like\" irritation on skin    Tylox [Oxycodone-Acetaminophen] Nausea and Vomiting    Celexa [Citalopram] Unknown (comments)     violent    Flu Vaccine Qs2014-15(36mo,Up) Nausea Only           PE:     Physical Exam  Vitals signs and nursing note reviewed. Constitutional:       General: She is not in acute distress. Appearance: Normal appearance. She is not ill-appearing. Eyes:      Extraocular Movements: Extraocular movements intact. Pupils: Pupils are equal, round, and reactive to light. Neck:      Musculoskeletal: Normal range of motion and neck supple. Cardiovascular:      Pulses: Normal pulses. Pulmonary:      Effort: Pulmonary effort is normal. No respiratory distress. Abdominal:      General: Abdomen is flat. There is no distension. Musculoskeletal:         General: Swelling, tenderness and signs of injury present. No deformity. Right lower leg: No edema. Left lower leg: No edema. Skin:     General: Skin is warm and dry. Capillary Refill: Capillary refill takes less than 2 seconds. Findings: No bruising or erythema. Neurological:      General: No focal deficit present. Mental Status: She is alert and oriented to person, place, and time.    Psychiatric:         Mood and Affect: Mood normal.         Behavior: Behavior normal.            Right hand: There is granulation tissue in the volar aspect of the fourth webspace. Range of motion of the little finger is limited due to stiffness and pain. There is no gross deformity noted. Imaging:     10/14/2020 plain films of the right hand shows a reduced little finger MP joint with no evidence of fracture. Previous plain films reviewed from outside facility shows a right little finger MP joint ulnar dorsal dislocation. ICD-10-CM ICD-9-CM    1. Dislocation of metacarpophalangeal joint of right little finger, sequela  S63.266S 905.6 REFERRAL TO OCCUPATIONAL THERAPY   2. Right hand pain  M79.641 729.5 AMB POC XRAY, HAND; 3+ VIEWS      REFERRAL TO OCCUPATIONAL THERAPY   3. Stiffness of right hand joint  M25.641 719.54 REFERRAL TO OCCUPATIONAL THERAPY         Plan:     Buddy straps to be used as much as needed. OT for range of motion program.    Follow-up and Dispositions    · Return in about 4 weeks (around 11/11/2020) for Reevaluation and OT follow-up.           Plan was reviewed with patient, who verbalized agreement and understanding of the plan

## 2020-11-05 ENCOUNTER — HOSPITAL ENCOUNTER (OUTPATIENT)
Dept: PHYSICAL THERAPY | Age: 60
Discharge: HOME | End: 2020-11-05
Payer: MEDICARE

## 2020-11-05 PROCEDURE — 97110 THERAPEUTIC EXERCISES: CPT

## 2020-11-05 PROCEDURE — 97166 OT EVAL MOD COMPLEX 45 MIN: CPT

## 2020-11-05 PROCEDURE — 97018 PARAFFIN BATH THERAPY: CPT

## 2020-11-05 NOTE — PROGRESS NOTES
OT DAILY TREATMENT NOTE     Patient Name: Gaudencio Hamilton  Date:2020  : 1960  [x]  Patient  Verified  Payor: [facility] MEDICARE / Plan: [facility] COMMUNITY PLAN MCR / Product Type: Managed Care Medicare /    In time:430  Out time:515  Total Treatment Time (min): 45  Visit #: 1 of 4    Medicare/BCBS Only   Total Timed Codes (min):  13 1:1 Treatment Time:  45     Treatment Area: Pain in right hand [M79.641]  Dislocation of metacarpophalangeal joint of right little finger, sequela [S63.266S]  Stiffness of right hand, not elsewhere classified [M25.641]    SUBJECTIVE  Pain Level (0-10 scale): 5/10  Any medication changes, allergies to medications, adverse drug reactions, diagnosis change, or new procedure performed?: [x] No    [] Yes (see summary sheet for update)  Subjective functional status/changes:   [] No changes reported  Pain in palm between digits 4,5    OBJECTIVE    Modality rationale: decrease pain and increase tissue extensibility to improve the patients ability to grasp   Min Type Additional Details    [] Estim:  []Unatt       []IFC  []Premod                        []Other:  []w/ice   []w/heat  Position:  Location:    [] Estim: []Att    []TENS instruct  []NMES                    []Other:  []w/US   []w/ice   []w/heat  Position:  Location:    []  Traction: [] Cervical       []Lumbar                       [] Prone          []Supine                       []Intermittent   []Continuous Lbs:  [] before manual  [] after manual    []  Ultrasound: []Continuous   [] Pulsed                           []1MHz   []3MHz W/cm2:  Location:    []  Iontophoresis with dexamethasone         Location: [] Take home patch   [] In clinic         10 []  Ice     []  heat  []  Ice massage  []  Laser   [x]  Paraffin Position:  Location:right hand    []  Laser with stim  []  Other:  Position:  Location:    []  Vasopneumatic Device Pressure:       [] lo [] med [] hi   Temperature: [] lo [] med [] hi       [x] Skin assessment post-treatment:  [x]intact []redness- no adverse reaction    []redness - adverse reaction:     20 min [x]Eval                  []Re-Eval       13 min Therapeutic Exercise:  [] See flow sheet :   Rationale: increase ROM and increase strength to improve the patients ability to grasp, extend digits  Tendon glides with OT acting as mauro strap x 10 right  Right 5th digit blocked PIP extension x 10    With   [] TE   [] TA   [] neuro   [] other: Patient Education: [x] Review HEP    [] Progressed/Changed HEP based on: Mauro straps as much as possible  [] positioning   [] body mechanics   [] transfers   [] heat/ice application   [] Splint wear/care   [] Sensory re-education   [] scar management      [] other:            Other Objective/Functional Measures:   Subjective: pt is a right hand dominant, 60 y.o.y/o, female who sustained his/her injury in fall  . Was splinted for one month. Has had persistent pain 5/10 in right hand since that time. Prior level of function: I self care, some home care and cooking. REad on phone play games, color on phone. .  Prior CNA, retail, sales, food industry    Pain level:(0-no pain 10-debilitating pain) moderate    Description/Location: right hand with c/o no relief   Worst pain10/10 Least pain 5/10   Activities which aggravate pain: cold, trying to straighten   Activities which ease pain: warm, rest  Current functional limitations/living situation: with daughter , son  in law 4 grandchildren , 7-17 years old), dog. Writing, opening containers, pushing up to stand, using cane in hand    Medical hx: Depression arthritis, HTN, asthma, back pain    Medications: See the written copy of this report in the patient's paper medical record.        Objective:  Sensation:Reports 5th digit feels tingly all over on right     Range of Motion:No changes noted    Hand ROM R/L   Index Middle Ring Small     MP 0-80 10-90 20-85 0-30/  105     PIP 0-105 0-105 0-110 30-65/95     DIP 0-60 0-65 0-70 20-50/  70 Able to achieve full tight fist when performing functional  task ( 9 hole)    Hand Strength:   Gross Grasp 3pt Pinch Lateral Pinch Tip Pinch   Right  NT 10 12 7   Left 52 12 12 5     Nine-Hole Peg Test:  Left= 26_____seconds  Right=_29____seconds  Finger Opposition: To all tips    Palpation: Tender palm at [facility] for digits 4-5    ADLs  Feeding:        []MaxA   []ModA   []Mis   [] CGA   []SBA   []Akbar   [x]Independent  UE Dressing:       []MaxA   []ModA   []Mis   [] CGA   []SBA   []Akbar   [x]Independent  LE Dressing:       []MaxA   []ModA   []Mis   [] CGA   []SBA   []Akbar   [x]Independent  Grooming:       []MaxA   []ModA   []Mis   [] CGA   []SBA   []Akbar   [x]Independent  Toileting:       []MaxA   []ModA   []Mis   [] CGA   []SBA   []Akbar   [x]Independent  Bathing:       []MaxA   []ModA   []Mis   [] CGA   []SBA   []Akbar   [x]Independent  Light Meal Prep:    []MaxA   []ModA   []Mis   [] CGA   []SBA   []Akbar   [x]Independent  Household/Other: []MaxA   []ModA   []Mis   [] CGA   []SBA   []Akbar   []Independent  Adaptive Equip:     []MaxA   []ModA   []Mis   [] CGA   []SBA   []Akbar   []Independent  Driving:       []MaxA   []ModA   []Mis   [] CGA   []SBA   []Akbar   []Independent NA  Money Mgmt:        []MaxA   []ModA   []Mis   [] CGA   []SBA   []Akbar   []Independent       Pain Level (0-10 scale) post treatment: 4/10    ASSESSMENT/Changes in Function: *   [x]  See Plan of Care  []  See progress note/recertification  []  See Discharge Summary           PLAN  []  Upgrade activities as tolerated     []  Continue plan of care  []  Update interventions per flow sheet       []  Discharge due to:_  []  Other:_      Akila Eastman OTR/L 11/5/2020  4:01 PM    Future Appointments   Date Time Provider Bentley Alcazar   11/5/2020  4:30 PM Neda Renteria, OTR/L [id number]   11/23/2020  3:45 PM Rey Velez DO [facility] BS AMB   11/24/2020  3:00 PM Bing Lara MD [facility] BS AMB

## 2020-11-05 NOTE — PROGRESS NOTES
[facility]  [address] [place] [address]  [phone] fax    Plan of Care/Statement of Necessity for Occupational Therapy Services    Patient name: Mortimer Plan Start of Care: 2020   Referral source: Tha Shane DO : 1960    Medical Diagnosis: Pain in right hand [M79.641]  Dislocation of metacarpophalangeal joint of right little finger, sequela [S63.266S]  Stiffness of right hand, not elsewhere classified [M25.641]  Payor: [facility] MEDICARE / Plan: [employer] MCR / Product Type: Managed Care Medicare /  Onset Date:2020    Treatment Diagnosis: Pain right hand   Prior Hospitalization: see medical history Provider#: [id number]   Medications: Verified on Patient summary List    Comorbidities: Depression, arthritis, HTN, asthma, hypothyroidism   Prior Level of Function:  I self care, some home care and cooking. REad on phone play games, color on phone. .  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 Assessment/ key information: Patient  is a right hand dominant, 61 y.o.y/o, female who sustained his/her injury in fall. Was splinted for one month. She has had persistent pain 5/10 in right hand since that time. Patient has significant reduction in AROM of right 5th digit when tested, which becomes much less during functional activities when she is able to flex digit fully to tight fist.  Her right 5th PIP extension is  limited to -20. Fine motor speed is slowed relative to norms and dominance. She reports pain with bearing weight on distal palmar crease when holding cane, as well as pain with pushing to stand. She has difficulty writing, carrying and opening containers. Her FOTO is 43/100 reflecting severe impairment in function.   She will benefit from brief skilled occupational therapy to provide task modifications, improve end range extension and digit adduction and increase functional independence. Evaluation Complexity: History MEDIUM Complexity : Expanded review of history including physical, cognitive and psychosocial  history  Examination MEDIUM Complexity : 3-5 performance deficits relating to physical, cognitive , or psychosocial skils that result in activity limitations and / or participation restrictions Clinical Decision Making MEDIUM Complexity : Patient may present with comorbidities that affect occupational performnce. Miniml to moderate modification of tasks or assistance (eg, physical or verbal ) with assesment(s) is necessary to enable patient to complete evaluation   Overall Complexity Rating: MEDIUM    Problem List: Pain effecting function, Decreased range of motion, Decreased strength, Decreased coordination/prehension and Decreased ADL/functional abilities      Treatment Plan may include any combination of the following: Therapeutic exercise, Therapeutic activities, Physical agent/modality, Patient education and ADLs/IADLs    Patient / Family readiness to learn indicated by: asking questions, trying to perform skills and interest    Persons(s) to be included in education:   patient (P)    Barriers to Learning/Limitations: None    Patient Goal (s): Use hand without pain    Patient Self Reported Health Status: fair    Rehabilitation Potential: excellent    Short Term Goals: To be accomplished in 2 treatments:  1. Patient will be independent in [facility] for digit ROM. 2.  Patient will be familiar with task modifications to assistive devices to reduce pain with cane use. Long Term Goals: To be accomplished in 4 treatments:   1. Patient will report pain diminished to 0-2/10 with routine use  2. Patient will report improved ability push up to stand, lift and carry with right hand as shown by increase in FOTO of at least 15 points.   3.  Patient will achieve AROM of right 5th digit to within 10 degrees of full extension to avoid injury to digit. Frequency / Duration: Patient to be seen 2 times per week for 2 treatments:    Patient/ Caregiver education and instruction: Diagnosis, prognosis, self care, activity modification and exercises  [x]  Plan of care has been reviewed with ELKINS    Certification Period: 11/5/2020-12/4/2020    ROSANA Ward 11/5/2020 4:16 PM      ________________________________________________________________________    I certify that the above Therapy Services are being furnished while the patient is under my care. I agree with the treatment plan and certify that this therapy is necessary.     Physician's Signature:____________Date:_________TIME:________    ** Signature, Date and Time must be completed for valid certification **    Please sign and return to [facility] - [facility]   [address] [place] [address]  [phone] fax

## 2020-11-12 ENCOUNTER — HOSPITAL ENCOUNTER (OUTPATIENT)
Dept: PHYSICAL THERAPY | Age: 60
Discharge: HOME | End: 2020-11-12
Payer: MEDICARE

## 2020-11-24 ENCOUNTER — OFFICE VISIT (OUTPATIENT)
Dept: NEUROLOGY | Age: 60
End: 2020-11-24
Payer: MEDICARE

## 2020-11-24 VITALS
BODY MASS INDEX: 34.89 KG/M2 | WEIGHT: 184.8 LBS | HEIGHT: 61 IN | HEART RATE: 77 BPM | RESPIRATION RATE: 20 BRPM | TEMPERATURE: 96.9 F | SYSTOLIC BLOOD PRESSURE: 120 MMHG | DIASTOLIC BLOOD PRESSURE: 72 MMHG | OXYGEN SATURATION: 95 %

## 2020-11-24 DIAGNOSIS — G89.29 CHRONIC LEFT-SIDED LOW BACK PAIN WITH LEFT-SIDED SCIATICA: ICD-10-CM

## 2020-11-24 DIAGNOSIS — R26.9 GAIT DIFFICULTY: Primary | ICD-10-CM

## 2020-11-24 DIAGNOSIS — M54.42 CHRONIC LEFT-SIDED LOW BACK PAIN WITH LEFT-SIDED SCIATICA: ICD-10-CM

## 2020-11-24 PROCEDURE — 99204 OFFICE O/P NEW MOD 45 MIN: CPT | Performed by: STUDENT IN AN ORGANIZED HEALTH CARE EDUCATION/TRAINING PROGRAM

## 2020-11-24 NOTE — PROGRESS NOTES
Eufemia Collins is a 61 y.o. female new patient in office today to discuss frequent falls; as referred by [facility], NP. The patient  has a history of falls. I did complete a risk assessment. Fall Risk Assessment, last 12 mths 11/24/2020   Able to walk? Yes   Fall in past 12 months? Yes   Fall with injury?  Yes   Number of falls in past 12 months 5   Fall Risk Score 6

## 2020-11-24 NOTE — PROGRESS NOTES
Delano Martines is a 61 y.o. female . presents for New Patient [facility], NP) and Fall   . A 61years old female patient with past medical history of asthma, hypertension, hyperlipidemia, type II DM, and bipolar disorder referred for evaluation of gait difficulty and multiple falls for the past 3 years. The gait difficulty is progressively getting worse. Currently uses a cane. She feels off balance and unstable when walking. Left lower extremity feels weak and has some difficulty lifting her legs up when walking. Has low back pain which radiates to the left lower extremity. Occasional numbness and tingling on the left side. She also has left-sided hip pain with no obvious swelling. September 2020, she was admitted to [facility] after a fall while walking down stairs. There is questionable right-sided weakness at the time. MRI of the brain was done and it showed left midbrain decreased attenuation concerning for an acute to subacute stroke; also has lesions seen the bilateral thalami concerning for old lacunar strokes. MRA of the brain did not show any significant intracranial artery narrowing. MRI of the cervical spine was done and it showed multilevel neural foraminal narrowing with no reported signs of myelopathy. Patient had cervical fusion surgery about 15 years ago. 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 Patient denied having any incontinence of bowel or bladder. No recent trauma. Has tremor of the upper extremities when she is trying to hold something. No resting tremor. No obvious stiffness of her extremities. No hallucinations. Patient takes Seroquel for her bipolar disorder. Review of Systems   Constitutional: Negative for chills, fever and weight loss. HENT: Negative for hearing loss and tinnitus.     Eyes: Negative for blurred vision (uses glasses) and double vision. Respiratory: Positive for cough, sputum production and shortness of breath. Negative for hemoptysis. Cardiovascular: Negative for chest pain and leg swelling. Gastrointestinal: Negative for vomiting. Genitourinary: Negative for dysuria, frequency and urgency. Musculoskeletal: Positive for back pain, falls and neck pain. Negative for joint pain. Skin: Positive for itching. Negative for rash. Neurological: Positive for dizziness (vertigo), tingling (LLe), tremors (when trying to hold something), sensory change, focal weakness (Lle) and headaches. Negative for seizures. Psychiatric/Behavioral: Negative for depression (takes medications; stable).        Past Medical History:   Diagnosis Date    Asthma, chronic, moderate persistent, with acute exacerbation     Asthmatic bronchitis without complication 05/7/1926    Bipolar 1 disorder, manic, mild ([facility] [address]     CAD (coronary artery disease)     Controlled type 2 diabetes mellitus without complication, without long-term current use of insulin (HCC)     Diabetes ([facility] [address]     Hyperlipidemia     Hypertension     Hypothyroidism     Liver disease        Past Surgical History:   Procedure Laterality Date    HX APPENDECTOMY  10/1969    HX BACK SURGERY       neck& back surgery    HX CERVICAL LAMINECTOMY      HX CYST REMOVAL Left     Baker's    HX HYSTERECTOMY Bilateral 10/1990    HX KNEE ARTHROSCOPY Left     torn cartilage x 2        Family History   Problem Relation Age of Onset    Cancer Mother     Heart Disease Father         Social History     Socioeconomic History    Marital status:      Spouse name: Not on file    Number of children: Not on file    Years of education: Not on file    Highest education level: Not on file   Occupational History    Not on file   Social Needs    Financial resource strain: Not on file    Food insecurity     Worry: Not on file     Inability: Not on file  Transportation needs     Medical: Not on file     Non-medical: Not on file   Tobacco Use    Smoking status: Current Every Day Smoker     Packs/day: 1.00     Years: 50.00     Pack years: 50.00     Types: Cigarettes     Start date: 6/17/1974    Smokeless tobacco: Never Used   Substance and Sexual Activity    Alcohol use: Never     Frequency: Never    Drug use: Not Currently     Types: Marijuana     Comment: as a teenager    Sexual activity: Not on file   Lifestyle    Physical activity     Days per week: Not on file     Minutes per session: Not on file    Stress: Not on file   Relationships    Social connections     Talks on phone: Not on file     Gets together: Not on file     Attends Anabaptism service: Not on file     Active member of club or organization: Not on file     Attends meetings of clubs or organizations: Not on file     Relationship status: Not on file    Intimate partner violence     Fear of current or ex partner: Not on file     Emotionally abused: Not on file     Physically abused: Not on file     Forced sexual activity: Not on file   Other Topics Concern    Not on file   Social History Narrative    Not on file        Allergies   Allergen Reactions    Codeine Nausea and Vomiting and Nausea Only    Other Medication Other (comments)     Nicotine Patches - Serious \"burn like\" irritation on skin    Oxycodone-Acetaminophen Nausea and Vomiting, Nausea Only and Other (comments)     Pt can take tylenol  Pt can take tylenol      Carvedilol Other (comments)    Citalopram Unknown (comments) and Other (comments)     violent    Nicotine Other (comments)     Nicotine patches  Other reaction(s): toxic skin reaction  Nicotine patches      Tramadol Nausea Only and Other (comments)    Flu Vaccine Qs2014-15(36mo,Up) Nausea Only         Current Outpatient Medications   Medication Sig Dispense Refill    aspirin delayed-release 81 mg tablet Take  by mouth daily.       ondansetron hcl (Zofran) 4 mg tablet Take 4 mg by mouth every eight (8) hours as needed for Nausea or Vomiting.  loperamide (IMODIUM) 2 mg capsule Take  by mouth.  promethazine (PHENERGAN) 12.5 mg tablet Take  by mouth every six (6) hours as needed for Nausea.  albuterol (PROVENTIL HFA, VENTOLIN HFA, PROAIR HFA) 90 mcg/actuation inhaler Take 1 Puff by inhalation every four (4) hours as needed for Wheezing. 1 Inhaler 3    rosuvastatin (CRESTOR) 20 mg tablet Take 20 mg by mouth daily.  cyanocobalamin 1,000 mcg tablet Take 1,000 mcg by mouth daily.  tiotropium bromide (Spiriva Respimat) 2.5 mcg/actuation inhaler Take 2 Puffs by inhalation daily. 1 Inhaler 5    atorvastatin (LIPITOR) 80 mg tablet Take 80 mg by mouth daily.  budesonide-formoteroL (SYMBICORT) 160-4.5 mcg/actuation HFAA Take 2 Puffs by inhalation.  DULoxetine (CYMBALTA) 60 mg capsule Take 60 mg by mouth daily.  lamoTRIgine (LaMICtal) 200 mg tablet Take 200 mg by mouth two (2) times a day.  meclizine (ANTIVERT) 25 mg tablet Take 25 mg by mouth daily as needed.  QUEtiapine (SEROquel) 100 mg tablet Take 200 mg by mouth daily.  traZODone (DESYREL) 100 mg tablet Take 200 mg by mouth nightly.  venlafaxine (EFFEXOR) 75 mg tablet Take 150 mg by mouth daily.  levothyroxine (SYNTHROID) 75 mcg tablet Take 100 mcg by mouth Daily (before breakfast). Indications: a condition with low thyroid hormone levels      lisinopril (PRINIVIL, ZESTRIL) 5 mg tablet Take 5 mg by mouth daily. Indications: HYPERTENSION      lovastatin (MEVACOR) 10 mg tablet Take 10 mg by mouth daily (after breakfast). Indications: HYPERCHOLESTEROLEMIA      albuterol (PROVENTIL HFA, VENTOLIN HFA, PROAIR HFA) 90 mcg/actuation inhaler Take 2 Puffs by inhalation every four (4) hours as needed.  aspirin-calcium carbonate 81 mg-300 mg calcium(777 mg) tab Take 81 mg by mouth daily.       glucose blood VI test strips (ASCENSIA AUTODISC VI, ONE TOUCH ULTRA TEST VI) strip Test once a day for DX E11.9      cyclobenzaprine (FLEXERIL) 10 mg tablet Take 10 mg by mouth two (2) times a day. Indications: MUSCLE SPASM           Physical Exam  Constitutional:       Appearance: Normal appearance. HENT:      Head: Normocephalic and atraumatic. Mouth/Throat:      Mouth: Mucous membranes are moist.      Pharynx: No oropharyngeal exudate. Eyes:      Extraocular Movements: Extraocular movements intact. Pupils: Pupils are equal, round, and reactive to light. Neck:      Musculoskeletal: Normal range of motion and neck supple. Pulmonary:      Effort: Pulmonary effort is normal. No respiratory distress. Musculoskeletal:         General: No tenderness. Right lower leg: No edema. Left lower leg: No edema. Neurological:      Mental Status: She is alert. Comments: Mental status: Awake, alert, oriented x3, follows simple and complex commands, no neglect. Speech and languge: fluent, coherent, and comprehension intact  CN: VFF, EOMI, PERRLA, face sensation intact , no facial asymmetry noted, palate elevation symmetric bilat, SS+SCM 5/5 bilat, tongue midline  Motor: no pronator drift, tone normal throughout with no cogwheeling, strength 5/5 throughout except for some point for over the lower extremities from pain. Sensory: intact to light touch, pinprick, vibration, and position senses throughout. Coordination: FNF, HS accurate w/o dysmetria. No tremor. DTR: 3+ throughout, toes downgoing BL; questionable left positive movement. Gait: Antalgic; slow but not festinating. No visits with results within 3 Month(s) from this visit.    Latest known visit with results is:   Hospital Outpatient Visit on 12/12/2019   Component Date Value Ref Range Status    Creatinine, POC 12/12/2019 1.4* 0.6 - 1.3 MG/DL Final    GFRAA, POC 12/12/2019 47* >60 ml/min/1.73m2 Final    GFRNA, POC 12/12/2019 38* >60 ml/min/1.73m2 Final    Comment: Estimated GFR is calculated using the IDMS-traceable Modification of Diet in Renal Disease (MDRD) Study equation, reported for both  Americans (GFRAA) and non- Americans (GFRNA), and normalized to 1.73m2 body surface area. The physician must decide which value applies to the patient. The MDRD study equation should only be used in individuals age 25 or older. It has not been validated for the following: pregnant women, patients with serious comorbid conditions, or on certain medications, or persons with extremes of body size, muscle mass, or nutritional status. ICD-10-CM ICD-9-CM    1. Gait difficulty  R26.9 781.2 MRI LUMB SPINE WO CONT      REFERRAL TO PHYSICAL THERAPY   2. Chronic left-sided low back pain with left-sided sciatica  M54.42 724.2 MRI LUMB SPINE WO CONT    G89.29 724.3      338.29      A 61years old female patient for progressive gait difficulty and multiple falls currently using a cane. Claims her left leg lower extremity feels weak and gives way. Stumbles a lot. Has chronic lower back pain which radiates to the left lower extremity. Had cervical fusion surgery about 15 years ago for spinal canal stenosis. She was recently admitted to [facility] for a fall while walking down the stairs; resulting in dorsal lateral dislocation at the fifth metacarpophalangeal joint. No significant head injury. There was no loss of consciousness. She does not have any dizziness or lightheadedness before her falls. During her admission, MRI of the brain was done and there was a small left midbrain acute subacute infarction; also showed chronic small bilateral thalamic infarctions. MRA of the head was unremarkable. MRA of the neck did not show any significant extracranial artery disease. She is currently taking aspirin and rosuvastatin.   The gait difficulty and the falls this patient could be multifactorial: Cervical fusion surgery years ago, possible thoracic myelopathy, hip pain, lower back pain with possible radiculopathy. Will get MRI of the lumbosacral spine. 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 I have advised her to continuously use a cane. Continue follow-up with physical therapy. We will see her in 3 months time.

## 2020-12-03 NOTE — PROGRESS NOTES
[facility] [place] [facility] [employer] [facility]  [address] [place] [address]  [phone] fax    Occupational Therapy Discharge Summary  Patient name: Tara Franco Start of Care: 2020   Referral source: Raquel MERCADO DO : 1960   Medical/Treatment Diagnosis: Pain in right hand [M79.641]  Dislocation of metacarpophalangeal joint of right little finger, sequela [S63.266S]  Stiffness of right hand, not elsewhere classified [M25.641]  Payor: [facility] MEDICARE / Plan:  Paga MCR / Product Type: Managed Care Medicare /  Onset Date:2020     Prior Hospitalization: see medical history Provider#: [id number]   Medications: Verified on Patient Summary List    Comorbidities:  Depression, arthritis, HTN, asthma, hypothyroidism  Prior Level of Function: I self care, some home care and cooking.  REad on phone play games, color on phone. Yasmin Castro CNA, retail, sales, food industry  *                                                             Visits from Vendor of Care: 1    Missed Visits: 2    Reporting Period : 2020 to 2020    Summary of Care:patient seen for eval and provision of HEP and thermal modalities. . She cancelled or no showed for all other visits. Upon telephone call on 12/3/20 due to missed visits, she states her hand is doing great. She declined further services. 1.  Patient will be independent in HEP for digit ROM. Status at eval; Reviewed  Discharge status; met    2. Patient will be familiar with task modifications to assistive devices to reduce pain with cane use. Status at eval: reviewed use of washcloth to pad handle  Discharge status; met     Long Term Goals: To be accomplished in 4 treatments:       1. Patient will report pain diminished to 0-2/10 with routine use  Status at eval; Pain 5/10  Discharge status; Not reassessed due to unplanned discharge    2.   Patient will report improved ability push up to stand, lift and carry with right hand as shown by increase in FOTO of at least 15 points. Status at eval; FOTO 43  Discharge status; Not reassessed due to unplanned discharge     3. Patient will achieve AROM of right 5th digit to within 10 degrees of full extension to avoid injury to digit. Status at eval; CLEANING of 5th digit right 95  Discharge status; Not reassessed due to unplanned discharge    ASSESSMENT/Changes in Function: Patient reports resolution of pain in hand except when it is cold. She was able to demonstrate full flexion and near full extension during functional activities. She has HEP.     ASSESSMENT/RECOMMENDATIONS:  [x]Discontinue therapy: [x]Patient has reached or is progressing toward set goals      []Patient is non-compliant or has abdicated      []Due to lack of appreciable progress towards set goals    Mayo Hung OTR/L 12/3/2020 5:45 PM

## 2020-12-08 DIAGNOSIS — R26.9 GAIT DIFFICULTY: ICD-10-CM

## 2020-12-08 DIAGNOSIS — M54.42 CHRONIC LEFT-SIDED LOW BACK PAIN WITH LEFT-SIDED SCIATICA: ICD-10-CM

## 2020-12-08 DIAGNOSIS — G89.29 CHRONIC LEFT-SIDED LOW BACK PAIN WITH LEFT-SIDED SCIATICA: ICD-10-CM

## 2021-03-09 ENCOUNTER — HOSPITAL ENCOUNTER (OUTPATIENT)
Dept: MRI IMAGING | Age: 61
Discharge: HOME | End: 2021-03-09
Attending: STUDENT IN AN ORGANIZED HEALTH CARE EDUCATION/TRAINING PROGRAM
Payer: MEDICARE

## 2021-03-09 PROCEDURE — 72148 MRI LUMBAR SPINE W/O DYE: CPT

## 2021-03-10 ENCOUNTER — OFFICE VISIT (OUTPATIENT)
Dept: ORTHOPEDIC SURGERY | Age: 61
End: 2021-03-10

## 2021-03-10 VITALS
HEIGHT: 61 IN | RESPIRATION RATE: 18 BRPM | OXYGEN SATURATION: 97 % | WEIGHT: 193 LBS | TEMPERATURE: 97.7 F | HEART RATE: 96 BPM | BODY MASS INDEX: 36.44 KG/M2 | DIASTOLIC BLOOD PRESSURE: 7 MMHG | SYSTOLIC BLOOD PRESSURE: 148 MMHG

## 2021-03-10 DIAGNOSIS — R26.9 GAIT DIFFICULTY: Primary | ICD-10-CM

## 2021-03-10 DIAGNOSIS — G89.29 CHRONIC LEFT-SIDED LOW BACK PAIN WITH LEFT-SIDED SCIATICA: ICD-10-CM

## 2021-03-10 DIAGNOSIS — M54.42 CHRONIC LEFT-SIDED LOW BACK PAIN WITH LEFT-SIDED SCIATICA: ICD-10-CM

## 2021-03-10 NOTE — PROGRESS NOTES
Spoke with patient, she reports she is already aware of results. Made aware of referral to orthopedic. She will discuss new referral at today's scheduled visit with Dr. Leonie Estrada.

## 2021-03-10 NOTE — PROGRESS NOTES
Please let her know that the MRI of lumbar spine has shown spinal canal narrowing. I want her to be evaluated by spine surgery and a referral has been placed.

## 2021-03-11 ENCOUNTER — OFFICE VISIT (OUTPATIENT)
Dept: ORTHOPEDIC SURGERY | Age: 61
End: 2021-03-11
Payer: MEDICARE

## 2021-03-11 VITALS
TEMPERATURE: 97.8 F | HEIGHT: 61 IN | HEART RATE: 97 BPM | SYSTOLIC BLOOD PRESSURE: 126 MMHG | DIASTOLIC BLOOD PRESSURE: 69 MMHG | RESPIRATION RATE: 15 BRPM | WEIGHT: 190 LBS | BODY MASS INDEX: 35.87 KG/M2 | OXYGEN SATURATION: 96 %

## 2021-03-11 DIAGNOSIS — M48.061 SPINAL STENOSIS OF LUMBAR REGION WITHOUT NEUROGENIC CLAUDICATION: ICD-10-CM

## 2021-03-11 DIAGNOSIS — M54.50 CHRONIC BILATERAL LOW BACK PAIN WITHOUT SCIATICA: ICD-10-CM

## 2021-03-11 DIAGNOSIS — G89.29 CHRONIC BILATERAL LOW BACK PAIN WITHOUT SCIATICA: ICD-10-CM

## 2021-03-11 DIAGNOSIS — M48.061 SPINAL STENOSIS OF LUMBAR REGION WITHOUT NEUROGENIC CLAUDICATION: Primary | ICD-10-CM

## 2021-03-11 PROCEDURE — 99204 OFFICE O/P NEW MOD 45 MIN: CPT | Performed by: PHYSICAL MEDICINE & REHABILITATION

## 2021-03-11 RX ORDER — METFORMIN HYDROCHLORIDE 500 MG/1
500 TABLET ORAL 2 TIMES DAILY WITH MEALS
COMMUNITY
End: 2021-06-24

## 2021-03-11 NOTE — PROGRESS NOTES
[address]  [address], [address]  [place], [address] [place] [address] Street  Phone: [phone]  Fax: [phone]      Patient: Markus Oneill                                                                              MRN: [id number]        YOB: 1960          AGE: 61 y.o. PCP: Margaret Kelley NP  Date:  03/11/21    Reason for Consultation: Back Pain      HPI:  Markus Oneill is a 61 y.o. female with relevant PMH of HTN, DM, bipolar, cervical  Cancer over 15 years ago- resection no chemo or radiation, prior cervical spine surgery C5-7 ACDF (done in [address]),  stage 3 kidney disease who was referred here by Dr. La Mccabe for severe spinal stenosis demonstrated on recent lumbar spine MRI. She reports 5 year history of low back pain which has progressively worsened. Pain is axial and currently does not radiate into the lower extremities. She has noted gait instability and uses a cane to ambulate. 9/2020 she was admitted to [facility] after a fall waking down stairs. At the time she had some right sided weakness and an MRI of the brain was done which showed left midbrain decreased attenuation concerning for acute to subacute stroke and old lacunar strokes. MRI cervical spine with prior surgery but no evidence of central spinal stenosis. 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 She was supposed to get an MRI of her lumbar spine when she was seen as an outpatient by Dr. La Mccabe but she developed a GI infection which lasted about two weeks. She had her MRI done yesterday and it demonstrates multilevel degenerative changes, epidural lipomatosis, severe spinal stenosis at L4/5 with anterior disc space and endplate edema. Today she reports mainly sharp low back pain. Neurologic symptoms: +numbness left leg when walking. . Reports bladder urgency and stress incontinence. No falls since 9/2020.         Location: The pain is located in the mid and low back pain  Radiation: The pain does not radiate . Pain Score: Currently: 7/10   Quality: Pain is of a Tight quality. Aggravating: Pain is exacerbated by walking, standing and exercise  Alleviating: The pain is alleviated by sitting    Prior Treatments:   Previous Medications: NA  Current Medications:NA  Previous work-up has included:   MRI thoracic spine 9/2010  1. 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 No prominent cord distention. 3-6 month follow-up examination might be considered. 2. Disc protrusions consistent with herniated discs at T6-T7 and T8-T9 as described above. There is very mild cord distortion at T8-T9 without cord signal change, this would not with complete confidence correlate with a facet myelopathy. 3. No additional high-grade stenosis, intrinsic spinal cord lesion or mass, no additional abnormality that would otherwise correlate with a thoracic myelopathy. MRI cervical spine 9/2020  Motion degraded exam without acute findings. 1.  C5-C7 ACDF construct. 2.  C4-C5 low-grade junctional level central stenosis. 3.  Multilevel foraminal stenosis. 4.  Degenerative facet arthropathy, greatest at left C3-C4. 5.  Advanced atlantodental joint degenerative arthropathy. MRI lumbar spine 3/9/21    L2-L3: Small posterior central disc protrusion with associated annular fissure  and background minimal disc bulge. Moderate facet arthropathy with ligamentum  flavum bulge. Spinal canal patent. Minimal foraminal narrowing.     L3-L4: Mild left foraminal disc protrusion with associated annular fissure and  background mild disc bulge. Mild to moderate facet arthropathy. Mild thecal sac  stenosis due to combination of degenerative change and epidural lipomatosis with  mild abutment of the crossing L4 nerves.  Mild bilateral foraminal stenoses.     L4-L5: Mild anterior disc space edema with posterior central disc protrusion  with annular fissure, and background mild disc/osteophyte bulge. Moderate facet  arthropathy with ligamentum flavum bulge. Severe thecal sac stenosis due to  combination of degenerative changes, epidural lipomatosis, and congenital  narrowing. Moderate right and mild left foraminal stenoses with abutment of the  exiting L4 nerves, right more than left.   -Anterior disc space edema and notable endplate edema at this level is favored  to represent active degenerative disc disease. Early discitis/osteomyelitis less  likely but clinical correlation advised. L5-S1: Minimal disc bulge. Moderate facet arthropathy. Mild thecal sac stenosis  largely due to congenital narrowing and epidural lipomatosis. Foramina patent.     Past Medical History:   Past Medical History:   Diagnosis Date    Asthma, chronic, moderate persistent, with acute exacerbation     Asthmatic bronchitis without complication 79/0/7941    Bipolar 1 disorder, manic, mild (HCC)     CAD (coronary artery disease)     Controlled type 2 diabetes mellitus without complication, without long-term current use of insulin (HCC)     Diabetes ([facility] [address]     Hyperlipidemia     Hypertension     Hypothyroidism     Liver disease       Past Surgical History:   Past Surgical History:   Procedure Laterality Date    HX APPENDECTOMY  10/1969    HX BACK SURGERY       neck& back surgery    HX CERVICAL LAMINECTOMY      HX CYST REMOVAL Left     [facility]aker's    HX HYSTERECTOMY Bilateral 10/1990    HX KNEE ARTHROSCOPY Left     torn cartilage x 2      SocHx:   Social History     Tobacco Use    Smoking status: Current Every Day Smoker     Packs/day: 1.00     Years: 50.00     Pack years: 50.00     Types: Cigarettes     Start date: 6/17/1974    Smokeless tobacco: Never Used   Substance Use Topics    Alcohol use: Never     Frequency: Never      FamHx:?    Family History   Problem Relation Age of Onset    Cancer Mother     Heart Disease Father Current Medications:    Current Outpatient Medications   Medication Sig Dispense Refill    metFORMIN (GLUCOPHAGE) 500 mg tablet Take 500 mg by mouth two (2) times daily (with meals).  aspirin delayed-release 81 mg tablet Take  by mouth daily.  ondansetron hcl (Zofran) 4 mg tablet Take 4 mg by mouth every eight (8) hours as needed for Nausea or Vomiting.  loperamide (IMODIUM) 2 mg capsule Take 2 mg by mouth four (4) times daily as needed.  promethazine (PHENERGAN) 12.5 mg tablet Take  by mouth every six (6) hours as needed for Nausea.  albuterol (PROVENTIL HFA, VENTOLIN HFA, PROAIR HFA) 90 mcg/actuation inhaler Take 1 Puff by inhalation every four (4) hours as needed for Wheezing. 1 Inhaler 3    cyanocobalamin 1,000 mcg tablet Take 1,000 mcg by mouth daily.  tiotropium bromide (Spiriva Respimat) 2.5 mcg/actuation inhaler Take 2 Puffs by inhalation daily. 1 Inhaler 5    albuterol (PROVENTIL HFA, VENTOLIN HFA, PROAIR HFA) 90 mcg/actuation inhaler Take 2 Puffs by inhalation every four (4) hours as needed.  atorvastatin (LIPITOR) 80 mg tablet Take 80 mg by mouth daily.  glucose blood VI test strips (ASCENSIA AUTODISC VI, ONE TOUCH ULTRA TEST VI) strip Test once a day for DX E11.9      budesonide-formoteroL (SYMBICORT) 160-4.5 mcg/actuation HFAA Take 2 Puffs by inhalation daily.  DULoxetine (CYMBALTA) 60 mg capsule Take 60 mg by mouth daily.  lamoTRIgine (LaMICtal) 200 mg tablet Take 200 mg by mouth two (2) times a day.  meclizine (ANTIVERT) 25 mg tablet Take 25 mg by mouth daily as needed.  QUEtiapine (SEROquel) 100 mg tablet Take 200 mg by mouth daily.  traZODone (DESYREL) 100 mg tablet Take 200 mg by mouth nightly.  venlafaxine (EFFEXOR) 75 mg tablet Take 150 mg by mouth daily.  levothyroxine (SYNTHROID) 75 mcg tablet Take 100 mcg by mouth Daily (before breakfast).  Indications: a condition with low thyroid hormone levels      lisinopril (PRINIVIL, ZESTRIL) 5 mg tablet Take 5 mg by mouth daily. Indications: HYPERTENSION      rosuvastatin (CRESTOR) 20 mg tablet Take 20 mg by mouth daily.  aspirin-calcium carbonate 81 mg-300 mg calcium(777 mg) tab Take 81 mg by mouth daily.  cyclobenzaprine (FLEXERIL) 10 mg tablet Take 10 mg by mouth two (2) times a day. Indications: MUSCLE SPASM      lovastatin (MEVACOR) 10 mg tablet Take 10 mg by mouth daily (after breakfast). Indications: HYPERCHOLESTEROLEMIA        Allergies:     Allergies   Allergen Reactions    Codeine Nausea and Vomiting and Nausea Only    Other Medication Other (comments)     Nicotine Patches - Serious \"burn like\" irritation on skin    Oxycodone-Acetaminophen Nausea and Vomiting, Nausea Only and Other (comments)     Pt can take tylenol  Pt can take tylenol      Carvedilol Other (comments)    Citalopram Unknown (comments) and Other (comments)     violent    Nicotine Other (comments)     Nicotine patches  Other reaction(s): toxic skin reaction  Nicotine patches      Tramadol Nausea Only and Other (comments)    Flu Vaccine Qs2014-15(36mo,Up) Nausea Only        Review of Systems:   Gen:    Denied fevers, chills, malaise, fatigue, weight changes   Resp: Denied shortness of breath, cough, wheezing   CVS: Denied chest pain, palpitations   : Denied urinary urgency, frequency, incontinence   GI: Denied nausea, vomiting, constipation, diarrhea   Skin: Denied rashes, wounds   Psych: Denied anxiety, depression   Vasc: Denied claudication, ulcers   Hem: Denied easy bruising/bleeding   MSK: See HPI   Neuro: See HPI         Physical Exam     Vital Signs:   Visit Vitals  BP (!) 141/77 (BP 1 Location: Left upper arm, BP Patient Position: Sitting) Comment: pt asymptomatic, MD aware   Pulse (!) 101 Comment: pt asymptomatic, MD aware   Temp 97.8 °F (36.6 °C) (Tympanic)   Resp 15   Ht 5' 1\" (1.549 m)   Wt 190 lb (86.2 kg)   SpO2 96% Comment: RA   BMI 35.90 kg/m² General: ??????? Well nourished and well developed female without any acute distress   Psychiatric: ?  Alert and oriented x 3 with normal mood    HEENT: ???????? Atraumatic   Respiratory:   Breathing non-labored and non dyspneic   CV: ???????????????? Peripheral pulses intact, no peripheral edema   Skin: ????????????? No rashes       Neurologic: ?? Sensation: normal and grossly intact thebilateral, upper extremity(s), lower extremity(s)   Strength: 4/5 in the bilateral, upper extremity(s), lower extremity(s)   Reflexes: reveals hyper-reflexic 3+ symmetric DTRs throughout   Gait: antalgic gait, unable to tandem gait  Upper tract signs: Babinski down going, Ovalle's + left, 2-3 beats clonus b/l    Sharp tenderness in the low back around T 4 to palpation. Minimal  pain with lumbar flexion  + pain with extension  Negative straight leg and seated slump    Medical Decision Making:    Images:   MRI lumbar spine 3/9/21    L2-L3: Small posterior central disc protrusion with associated annular fissure  and background minimal disc bulge. Moderate facet arthropathy with ligamentum  flavum bulge. Spinal canal patent. Minimal foraminal narrowing.     L3-L4: Mild left foraminal disc protrusion with associated annular fissure and  background mild disc bulge. Mild to moderate facet arthropathy. Mild thecal sac  stenosis due to combination of degenerative change and epidural lipomatosis with  mild abutment of the crossing L4 nerves. Mild bilateral foraminal stenoses.     L4-L5: Mild anterior disc space edema with posterior central disc protrusion  with annular fissure, and background mild disc/osteophyte bulge. Moderate facet  arthropathy with ligamentum flavum bulge. Severe thecal sac stenosis due to  combination of degenerative changes, epidural lipomatosis, and congenital  narrowing.  Moderate right and mild left foraminal stenoses with abutment of the  exiting L4 nerves, right more than left.   -Anterior disc space edema and notable endplate edema at this level is favored  to represent active degenerative disc disease. Early discitis/osteomyelitis less  likely but clinical correlation advised. L5-S1: Minimal disc bulge. Moderate facet arthropathy. Mild thecal sac stenosis  largely due to congenital narrowing and epidural lipomatosis. Foramina patent. Lm Nuvia MRI thoracic spine 9/27/2020  1. 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 No prominent cord distention. 3-6 month follow-up examination might be considered. 2. Disc protrusions consistent with herniated discs at T6-T7 and T8-T9 as described above. There is very mild cord distortion at T8-T9 without cord signal change, this would not with complete confidence correlate with a facet myelopathy. Assessment:   61year old female with h/o DM, bipolar, prior CVA, progressive gait instability, and chronic low back pain which as been worsening. Lumbar spine with severe spinal stenosis and evidence of edema in disc space and endplates- severely tender to touch in the lower lumbar spine at l4 and no radicular pain today on exam.      1. L4/5 severe spinal stenosis  2. L4/5 disc space and endplate edema r/o discitis/osteomyelitis  3. Thoracic T8 syringohydromyelia  4. Prior cervical fusion C5-7 (15 years ago)  5. DM    Plan:      -Referral to Dr. Brianna Ambriz for surgical eval with severe spinal stenosis. Will order ESR, CRP and CBC with diff  as well as MRI with contrast to r/o discitis, osteomyelitis given sharp pain in lowe lumbar spine on exam and non contrast MRI findings. Given history of CKD will need to check Cr prior to contrast.  She denies any fevers, chills or recent infection (since GI infection 11/2020).     -MRI with contrast Lumbar spine  - Labs- BMP, CBC with Diff, SED, CRP  - Will need repeat MRI thoracic spine to f/u in syringohydromyelia in the future  -F/u as needed after she sees  [id number] [facility]        Total time spent with patient: 45 mins for today's visit was devoted to face-to-face counseling regarding the following:  Discussed diagnosis, treatment options, and risks and benefits of treatment          ? [id number]:1 month;

## 2021-03-11 NOTE — PATIENT INSTRUCTIONS
High Blood Pressure: Care Instructions  Overview     It's normal for blood pressure to go up and down throughout the day. But if it stays up, you have high blood pressure. Another name for high blood pressure is hypertension. Despite what a lot of people think, high blood pressure usually doesn't cause headaches or make you feel dizzy or lightheaded. It usually has no symptoms. But it does increase your risk of stroke, heart attack, and other problems. You and your doctor will talk about your risks of these problems based on your blood pressure. Your doctor will give you a goal for your blood pressure. Your goal will be based on your health and your age. Lifestyle changes, such as eating healthy and being active, are always important to help lower blood pressure. You might also take medicine to reach your blood pressure goal.  Follow-up care is a key part of your treatment and safety. Be sure to make and go to all appointments, and call your doctor if you are having problems. It's also a good idea to know your test results and keep a list of the medicines you take. How can you care for yourself at home? Medical treatment  · If you stop taking your medicine, your blood pressure will go back up. You may take one or more types of medicine to lower your blood pressure. Be safe with medicines. Take your medicine exactly as prescribed. Call your doctor if you think you are having a problem with your medicine. · Talk to your doctor before you start taking aspirin every day. Aspirin can help certain people lower their risk of a heart attack or stroke. But taking aspirin isn't right for everyone, because it can cause serious bleeding. · See your doctor regularly. You may need to see the doctor more often at first or until your blood pressure comes down. · If you are taking blood pressure medicine, talk to your doctor before you take decongestants or anti-inflammatory medicine, such as ibuprofen.  Some of these medicines can raise blood pressure. · Learn how to check your blood pressure at home. Lifestyle changes  · Stay at a healthy weight. This is especially important if you put on weight around the waist. Losing even 10 pounds can help you lower your blood pressure. · If your doctor recommends it, get more exercise. Walking is a good choice. Bit by bit, increase the amount you walk every day. Try for at least 30 minutes on most days of the week. You also may want to swim, bike, or do other activities. · Avoid or limit alcohol. Talk to your doctor about whether you can drink any alcohol. · Try to limit how much sodium you eat to less than 2,300 milligrams (mg) a day. Your doctor may ask you to try to eat less than 1,500 mg a day. · Eat plenty of fruits (such as bananas and oranges), vegetables, legumes, whole grains, and low-fat dairy products. · Lower the amount of saturated fat in your diet. Saturated fat is found in animal products such as milk, cheese, and meat. Limiting these foods may help you lose weight and also lower your risk for heart disease. · Do not smoke. Smoking increases your risk for heart attack and stroke. If you need help quitting, talk to your doctor about stop-smoking programs and medicines. These can increase your chances of quitting for good. When should you call for help? Call  911 anytime you think you may need emergency care. This may mean having symptoms that suggest that your blood pressure is causing a serious heart or blood vessel problem. Your blood pressure may be over 180/120. For example, call 911 if:    · You have symptoms of a heart attack. These may include:  ? Chest pain or pressure, or a strange feeling in the chest.  ? Sweating. ? Shortness of breath. ? Nausea or vomiting. ? Pain, pressure, or a strange feeling in the back, neck, jaw, or upper belly or in one or both shoulders or arms. ? Lightheadedness or sudden weakness.   ? A fast or irregular heartbeat.     · You have symptoms of a stroke. These may include:  ? Sudden numbness, tingling, weakness, or loss of movement in your face, arm, or leg, especially on only one side of your body. ? Sudden vision changes. ? Sudden trouble speaking. ? Sudden confusion or trouble understanding simple statements. ? Sudden problems with walking or balance. ? A sudden, severe headache that is different from past headaches.     · You have severe back or belly pain. Do not wait until your blood pressure comes down on its own. Get help right away. Call your doctor now or seek immediate care if:    · Your blood pressure is much higher than normal (such as 180/120 or higher), but you don't have symptoms.     · You think high blood pressure is causing symptoms, such as:  ? Severe headache.  ? Blurry vision. Watch closely for changes in your health, and be sure to contact your doctor if:    · Your blood pressure measures higher than your doctor recommends at least 2 times. That means the top number is higher or the bottom number is higher, or both.     · You think you may be having side effects from your blood pressure medicine. Where can you learn more? Go to http://www.gray.com/  Enter [id number] in the search box to learn more about \"High Blood Pressure: Care Instructions. \"  Current as of: December 16, 2019               Content Version: 12.6  © 0430-6061 [employer]. Care instructions adapted under license by [employer] (which disclaims liability or warranty for this information). If you have questions about a medical condition or this instruction, always ask your healthcare professional. [address] any warranty or liability for your use of this information.

## 2021-03-11 NOTE — PROGRESS NOTES
Nevin Benson presents today for   Chief Complaint   Patient presents with    Back Pain       Is someone accompanying this pt? no    Is the patient using any DME equipment during OV? no    Depression Screening:  3 most recent PHQ Screens 3/10/2021   PHQ Not Done -   Little interest or pleasure in doing things Several days   Feeling down, depressed, irritable, or hopeless Several days   Total Score PHQ 2 2       Learning Assessment:  Learning Assessment 3/17/2020   PRIMARY LEARNER Patient   HIGHEST LEVEL OF EDUCATION - PRIMARY LEARNER  > 4 YEARS OF COLLEGE   BARRIERS PRIMARY LEARNER NONE   CO-LEARNER CAREGIVER No   PRIMARY LANGUAGE ENGLISH    NEED No   LEARNER PREFERENCE PRIMARY DEMONSTRATION     LISTENING   ANSWERED BY patient   RELATIONSHIP SELF         Fall Risk  Fall Risk Assessment, last 12 mths 3/10/2021   Able to walk? Yes   Fall in past 12 months? 1   Do you feel unsteady? 0   Are you worried about falling 0   Number of falls in past 12 months 2   Fall with injury? 1       Coordination of Care:  1. Have you been to the ER, urgent care clinic since your last visit? no  Hospitalized since your last visit? no    2. Have you seen or consulted any other health care providers outside of the [address] [place] [address] since your last visit? Yes, ortho Include any pap smears or colon screening.  no

## 2021-03-15 ENCOUNTER — TELEPHONE (OUTPATIENT)
Dept: ORTHOPEDIC SURGERY | Age: 61
End: 2021-03-15

## 2021-03-15 NOTE — TELEPHONE ENCOUNTER
Luis Sarabia does not participate with [facility]. I attempted to contact patient and reached unidentified voicemail. I left a generic message requesting a return call. Her options for spine surgery are [facility], [facility] in [place] or a private practice in [place].

## 2021-03-17 ENCOUNTER — TELEPHONE (OUTPATIENT)
Dept: ORTHOPEDIC SURGERY | Age: 61
End: 2021-03-17

## 2021-03-17 NOTE — TELEPHONE ENCOUNTER
Patient called into the office and I received an e-mail regarding the conversation. See the referral for further information.

## 2021-03-17 NOTE — TELEPHONE ENCOUNTER
Called patient to see if she had appointment with . Suyapa Vee was scheduled for 3/12/21 but unfortunately he does not take her insurance. She is planning to go to [address] [place] [address] in [place] with [facility] but has not scheduled yet. She has MRI with contrast appointment but not until April 1. Going to try to see if we can get MRI sooner and she will go to get lab work Sed rate, CRP and CBC with diff done ASAP.   Discussed that she should go to ED if develops any fevers, chills, or pain becomes more severe

## 2021-03-25 ENCOUNTER — TELEPHONE (OUTPATIENT)
Dept: ORTHOPEDIC SURGERY | Age: 61
End: 2021-03-25

## 2021-03-25 NOTE — TELEPHONE ENCOUNTER
Patient has found a surgeon that accepts her insurance and is requesting we send referral to Dr. Patrick Galicia, [address] [facility]. Patient provided their phone # [phone], fax # [phone].

## 2021-04-01 ENCOUNTER — HOSPITAL ENCOUNTER (OUTPATIENT)
Age: 61
Discharge: HOME | End: 2021-04-01
Attending: PHYSICAL MEDICINE & REHABILITATION
Payer: MEDICARE

## 2021-04-01 VITALS — BODY MASS INDEX: 35.9 KG/M2 | WEIGHT: 190 LBS

## 2021-04-01 LAB — CREAT UR-MCNC: 1.2 MG/DL (ref 0.6–1.3)

## 2021-04-01 PROCEDURE — 82565 ASSAY OF CREATININE: CPT

## 2021-04-01 PROCEDURE — 72158 MRI LUMBAR SPINE W/O & W/DYE: CPT

## 2021-04-01 PROCEDURE — 74011636320 HC RX REV CODE- 636/320: Performed by: PHYSICAL MEDICINE & REHABILITATION

## 2021-04-01 PROCEDURE — A9575 INJ GADOTERATE MEGLUMI 0.1ML: HCPCS | Performed by: PHYSICAL MEDICINE & REHABILITATION

## 2021-04-01 RX ADMIN — GADOTERATE MEGLUMINE 20 ML: 376.9 INJECTION INTRAVENOUS at 13:46

## 2021-04-07 ENCOUNTER — TELEPHONE (OUTPATIENT)
Dept: ORTHOPEDIC SURGERY | Age: 61
End: 2021-04-07

## 2021-04-07 NOTE — TELEPHONE ENCOUNTER
Called and reviewed MRI findings, no evidence of discitis, osteomyelitis based on comparison with prior MRI.   She has appt to f/u with NSGY for severe spinal stenosis

## 2021-06-12 ENCOUNTER — HOSPITAL ENCOUNTER (EMERGENCY)
Age: 61
Discharge: PSYCHIATRIC OR SUBSTANCE USE FACILITY | End: 2021-06-13
Attending: STUDENT IN AN ORGANIZED HEALTH CARE EDUCATION/TRAINING PROGRAM
Payer: MEDICARE

## 2021-06-12 VITALS
WEIGHT: 190 LBS | DIASTOLIC BLOOD PRESSURE: 70 MMHG | HEART RATE: 74 BPM | TEMPERATURE: 98.1 F | HEIGHT: 66 IN | SYSTOLIC BLOOD PRESSURE: 170 MMHG | OXYGEN SATURATION: 97 % | RESPIRATION RATE: 14 BRPM | BODY MASS INDEX: 30.53 KG/M2

## 2021-06-12 DIAGNOSIS — Z59.00 HOMELESSNESS: ICD-10-CM

## 2021-06-12 DIAGNOSIS — R44.0 AUDITORY HALLUCINATION: ICD-10-CM

## 2021-06-12 DIAGNOSIS — R45.851 SUICIDAL IDEATION: Primary | ICD-10-CM

## 2021-06-12 LAB
ALBUMIN SERPL-MCNC: 4.3 G/DL (ref 3.4–5)
ALBUMIN/GLOB SERPL: 1.2 {RATIO} (ref 0.8–1.7)
ALP SERPL-CCNC: 157 U/L (ref 45–117)
ALT SERPL-CCNC: 23 U/L (ref 13–56)
AMPHET UR QL SCN: NEGATIVE
ANION GAP SERPL CALC-SCNC: 7 MMOL/L (ref 3–18)
AST SERPL-CCNC: 14 U/L (ref 10–38)
BARBITURATES UR QL SCN: NEGATIVE
BASOPHILS # BLD: 0.1 K/UL (ref 0–0.1)
BASOPHILS NFR BLD: 1 % (ref 0–2)
BENZODIAZ UR QL: NEGATIVE
BILIRUB SERPL-MCNC: 0.7 MG/DL (ref 0.2–1)
BUN SERPL-MCNC: 10 MG/DL (ref 7–18)
BUN/CREAT SERPL: 7 (ref 12–20)
CALCIUM SERPL-MCNC: 9 MG/DL (ref 8.5–10.1)
CANNABINOIDS UR QL SCN: NEGATIVE
CHLORIDE SERPL-SCNC: 106 MMOL/L (ref 100–111)
CO2 SERPL-SCNC: 26 MMOL/L (ref 21–32)
COCAINE UR QL SCN: NEGATIVE
COVID-19 RAPID TEST, COVR: NOT DETECTED
CREAT SERPL-MCNC: 1.4 MG/DL (ref 0.6–1.3)
DIFFERENTIAL METHOD BLD: ABNORMAL
EOSINOPHIL # BLD: 0.3 K/UL (ref 0–0.4)
EOSINOPHIL NFR BLD: 3 % (ref 0–5)
ERYTHROCYTE [DISTWIDTH] IN BLOOD BY AUTOMATED COUNT: 13.9 % (ref 11.6–14.5)
ETHANOL SERPL-MCNC: <3 MG/DL (ref 0–3)
GLOBULIN SER CALC-MCNC: 3.7 G/DL (ref 2–4)
GLUCOSE BLD STRIP.AUTO-MCNC: 164 MG/DL (ref 70–110)
GLUCOSE SERPL-MCNC: 205 MG/DL (ref 74–99)
HCG SERPL QL: NEGATIVE
HCT VFR BLD AUTO: 45.4 % (ref 35–45)
HDSCOM,HDSCOM: NORMAL
HGB BLD-MCNC: 14.7 G/DL (ref 12–16)
LYMPHOCYTES # BLD: 2.1 K/UL (ref 0.9–3.6)
LYMPHOCYTES NFR BLD: 27 % (ref 21–52)
MCH RBC QN AUTO: 28.3 PG (ref 24–34)
MCHC RBC AUTO-ENTMCNC: 32.4 G/DL (ref 31–37)
MCV RBC AUTO: 87.5 FL (ref 74–97)
METHADONE UR QL: NEGATIVE
MONOCYTES # BLD: 0.6 K/UL (ref 0.05–1.2)
MONOCYTES NFR BLD: 7 % (ref 3–10)
NEUTS SEG # BLD: 4.9 K/UL (ref 1.8–8)
NEUTS SEG NFR BLD: 61 % (ref 40–73)
OPIATES UR QL: NEGATIVE
PCP UR QL: NEGATIVE
PLATELET # BLD AUTO: 253 K/UL (ref 135–420)
PMV BLD AUTO: 10.3 FL (ref 9.2–11.8)
POTASSIUM SERPL-SCNC: 3.8 MMOL/L (ref 3.5–5.5)
PROT SERPL-MCNC: 8 G/DL (ref 6.4–8.2)
RBC # BLD AUTO: 5.19 M/UL (ref 4.2–5.3)
SODIUM SERPL-SCNC: 139 MMOL/L (ref 136–145)
SOURCE, COVRS: NORMAL
WBC # BLD AUTO: 8 K/UL (ref 4.6–13.2)

## 2021-06-12 PROCEDURE — 99284 EMERGENCY DEPT VISIT MOD MDM: CPT

## 2021-06-12 PROCEDURE — 80307 DRUG TEST PRSMV CHEM ANLYZR: CPT

## 2021-06-12 PROCEDURE — 80053 COMPREHEN METABOLIC PANEL: CPT

## 2021-06-12 PROCEDURE — 80175 DRUG SCREEN QUAN LAMOTRIGINE: CPT

## 2021-06-12 PROCEDURE — 85025 COMPLETE CBC W/AUTO DIFF WBC: CPT

## 2021-06-12 PROCEDURE — 82077 ASSAY SPEC XCP UR&BREATH IA: CPT

## 2021-06-12 PROCEDURE — 87635 SARS-COV-2 COVID-19 AMP PRB: CPT

## 2021-06-12 PROCEDURE — 82962 GLUCOSE BLOOD TEST: CPT

## 2021-06-12 PROCEDURE — 74011250637 HC RX REV CODE- 250/637: Performed by: NURSE PRACTITIONER

## 2021-06-12 PROCEDURE — 84703 CHORIONIC GONADOTROPIN ASSAY: CPT

## 2021-06-12 PROCEDURE — 74011250637 HC RX REV CODE- 250/637: Performed by: PHYSICIAN ASSISTANT

## 2021-06-12 RX ORDER — CLONIDINE HYDROCHLORIDE 0.1 MG/1
0.1 TABLET ORAL
Status: COMPLETED | OUTPATIENT
Start: 2021-06-12 | End: 2021-06-12

## 2021-06-12 RX ORDER — GUAIFENESIN 100 MG/5ML
81 LIQUID (ML) ORAL DAILY
Status: DISCONTINUED | OUTPATIENT
Start: 2021-06-12 | End: 2021-06-13 | Stop reason: HOSPADM

## 2021-06-12 RX ORDER — NABUMETONE 500 MG/1
500 TABLET, FILM COATED ORAL 2 TIMES DAILY
COMMUNITY
Start: 2021-04-26 | End: 2021-06-24

## 2021-06-12 RX ORDER — TIZANIDINE 4 MG/1
4 TABLET ORAL
COMMUNITY
Start: 2021-04-26 | End: 2021-06-24

## 2021-06-12 RX ORDER — METFORMIN HYDROCHLORIDE 500 MG/1
500 TABLET ORAL 2 TIMES DAILY WITH MEALS
Status: DISCONTINUED | OUTPATIENT
Start: 2021-06-12 | End: 2021-06-13 | Stop reason: HOSPADM

## 2021-06-12 RX ORDER — QUETIAPINE FUMARATE 200 MG/1
TABLET, FILM COATED ORAL
COMMUNITY
Start: 2021-03-27 | End: 2021-06-24

## 2021-06-12 RX ORDER — METFORMIN HYDROCHLORIDE 1000 MG/1
1000 TABLET ORAL 2 TIMES DAILY
COMMUNITY
End: 2021-06-24

## 2021-06-12 RX ORDER — LISINOPRIL 10 MG/1
5 TABLET ORAL DAILY
Status: DISCONTINUED | OUTPATIENT
Start: 2021-06-12 | End: 2021-06-13 | Stop reason: HOSPADM

## 2021-06-12 RX ADMIN — CLONIDINE HYDROCHLORIDE 0.1 MG: 0.1 TABLET ORAL at 16:31

## 2021-06-12 RX ADMIN — ASPIRIN 81 MG: 81 TABLET, CHEWABLE ORAL at 23:46

## 2021-06-12 SDOH — ECONOMIC STABILITY - HOUSING INSECURITY: HOMELESSNESS UNSPECIFIED: Z59.00

## 2021-06-12 NOTE — ED NOTES
Will initiate SI checks every 15 minutes on paper form. Form will be scanned into patient's chart once completed.

## 2021-06-12 NOTE — ED NOTES
Assumed care of patient from triage. Received patient sitting on stretcher, awake, alert, oriented x 4. Introduced myself as her primary nurse. Explanation of plan of care provided to the patient; patient verbalized understanding. Assessment in progress.

## 2021-06-12 NOTE — ED TRIAGE NOTES
Patient presents to ED with c/o SI. Patient reports that she has a history or bipolar-depression, of which she is compliant with meds. Patient shares that her plan would be to OD on pills, of which are in her backpack with her now. She denies HI. Patient denies hallucinations but shares that she keeps hearing a voice that reminds her that she would be better off dead. Patient is homeless and seeking inpatient mental health treatment.  VSS

## 2021-06-12 NOTE — ED PROVIDER NOTES
EMERGENCY DEPARTMENT HISTORY AND PHYSICAL EXAM    Date: 6/12/2021  Patient Name: Fred Wooten    History of Presenting Illness     Chief Complaint   Patient presents with    Suicidal    Mental Health Problem         History Provided By: Patient     Chief Complaint: Suicidal ideation, hallucinations  Duration: Today  Timing: Acute  Location: N/A  Quality: Auditory  Severity: Moderate to severe  Modifying Factors: Worse after becoming homeless  Associated Symptoms: none        Additional History (Context): Fred Wooten is a 61 y.o. female with a history of bipolar disorder, diabetes, hypertension, CAD, and asthma who presents today for history as listed above. Patient reports that she has been intermittently homeless over the past couple months but now is officially homeless and states she no longer wants to live. Her plan is to take all of her pills which she brought with her in her backpack. Patient has not attempted suicide yet. Denies any homicidal ideations. Does report some auditory hallucinations and states that the voices are telling her to kill herself. Patient denies having any type of social support. Denies any alcohol drug or tobacco abuse. PCP: Rama JULIAN NP    Current Facility-Administered Medications   Medication Dose Route Frequency Provider Last Rate Last Admin    sodium chloride 0.9 % bolus infusion 1,000 mL  1,000 mL IntraVENous ONCE DevKamlesh malhotrakira [place], [place]         Current Outpatient Medications   Medication Sig Dispense Refill    metFORMIN (GLUCOPHAGE) 500 mg tablet Take 500 mg by mouth two (2) times daily (with meals).  aspirin delayed-release 81 mg tablet Take  by mouth daily.  ondansetron hcl (Zofran) 4 mg tablet Take 4 mg by mouth every eight (8) hours as needed for Nausea or Vomiting.  loperamide (IMODIUM) 2 mg capsule Take 2 mg by mouth four (4) times daily as needed.       promethazine (PHENERGAN) 12.5 mg tablet Take  by mouth every six (6) hours as needed for Nausea.  albuterol (PROVENTIL HFA, VENTOLIN HFA, PROAIR HFA) 90 mcg/actuation inhaler Take 1 Puff by inhalation every four (4) hours as needed for Wheezing. 1 Inhaler 3    rosuvastatin (CRESTOR) 20 mg tablet Take 20 mg by mouth daily.  cyanocobalamin 1,000 mcg tablet Take 1,000 mcg by mouth daily.  tiotropium bromide (Spiriva Respimat) 2.5 mcg/actuation inhaler Take 2 Puffs by inhalation daily. 1 Inhaler 5    albuterol (PROVENTIL HFA, VENTOLIN HFA, PROAIR HFA) 90 mcg/actuation inhaler Take 2 Puffs by inhalation every four (4) hours as needed.  aspirin-calcium carbonate 81 mg-300 mg calcium(777 mg) tab Take 81 mg by mouth daily.  atorvastatin (LIPITOR) 80 mg tablet Take 80 mg by mouth daily.  glucose blood VI test strips (ASCENSIA AUTODISC VI, ONE TOUCH ULTRA TEST VI) strip Test once a day for DX E11.9      budesonide-formoteroL (SYMBICORT) 160-4.5 mcg/actuation HFAA Take 2 Puffs by inhalation daily.  DULoxetine (CYMBALTA) 60 mg capsule Take 60 mg by mouth daily.  lamoTRIgine (LaMICtal) 200 mg tablet Take 200 mg by mouth two (2) times a day.  meclizine (ANTIVERT) 25 mg tablet Take 25 mg by mouth daily as needed.  QUEtiapine (SEROquel) 100 mg tablet Take 200 mg by mouth daily.  traZODone (DESYREL) 100 mg tablet Take 200 mg by mouth nightly.  venlafaxine (EFFEXOR) 75 mg tablet Take 150 mg by mouth daily.  levothyroxine (SYNTHROID) 75 mcg tablet Take 100 mcg by mouth Daily (before breakfast). Indications: a condition with low thyroid hormone levels      cyclobenzaprine (FLEXERIL) 10 mg tablet Take 10 mg by mouth two (2) times a day. Indications: MUSCLE SPASM      lisinopril (PRINIVIL, ZESTRIL) 5 mg tablet Take 5 mg by mouth daily. Indications: HYPERTENSION      lovastatin (MEVACOR) 10 mg tablet Take 10 mg by mouth daily (after breakfast).     Indications: HYPERCHOLESTEROLEMIA         Past History     Past Medical History:  Past Medical History:   Diagnosis Date    Asthma, chronic, moderate persistent, with acute exacerbation     Asthmatic bronchitis without complication 91/6/8282    Bipolar 1 disorder, manic, mild [facility]     CAD (coronary artery disease)     Controlled type 2 diabetes mellitus without complication, without long-term current use of insulin (HCC)     Diabetes ([facility]     Hyperlipidemia     Hypertension     Hypothyroidism     Liver disease        Past Surgical History:  Past Surgical History:   Procedure Laterality Date    HX APPENDECTOMY  10/1969    HX BACK SURGERY       neck& back surgery    HX CERVICAL LAMINECTOMY      HX CYST REMOVAL Left     [facility]    HX HYSTERECTOMY Bilateral 10/1990    HX KNEE ARTHROSCOPY Left     torn cartilage x 2       Family History:  Family History   Problem Relation Age of Onset    Cancer Mother     Heart Disease Father        Social History:  Social History     Tobacco Use    Smoking status: Current Every Day Smoker     Packs/day: 1.00     Years: 50.00     Pack years: 50.00     Types: Cigarettes     Start date: 6/17/1974    Smokeless tobacco: Never Used   Substance Use Topics    Alcohol use: Never    Drug use: Not Currently     Types: Marijuana     Comment: as a teenager       Allergies: Allergies   Allergen Reactions    Codeine Nausea and Vomiting and Nausea Only    Other Medication Other (comments)     Nicotine Patches - Serious \"burn like\" irritation on skin    Oxycodone-Acetaminophen Nausea and Vomiting, Nausea Only and Other (comments)     Pt can take tylenol  Pt can take tylenol      Carvedilol Other (comments)    Citalopram Unknown (comments) and Other (comments)     violent    Nicotine Other (comments)     Nicotine patches  Other reaction(s): toxic skin reaction  Nicotine patches      Tramadol Nausea Only and Other (comments)    Flu Vaccine [id number](36mo,Up) Nausea Only         Review of Systems   Review of Systems   Constitutional: Negative for chills and fever. HENT: Negative for congestion, rhinorrhea and sore throat. Respiratory: Negative for cough and shortness of breath. Cardiovascular: Negative for chest pain. Gastrointestinal: Negative for abdominal pain, blood in stool, constipation, diarrhea, nausea and vomiting. Genitourinary: Negative for dysuria, frequency and hematuria. Musculoskeletal: Negative for back pain and myalgias. Skin: Negative for rash and wound. Neurological: Negative for dizziness and headaches. All other systems reviewed and are negative. All Other Systems Negative  Physical Exam     Vitals:    06/12/21 1444   BP: (!) 184/84   Pulse: 97   Resp: 17   Temp: 98.8 °F (37.1 °C)   SpO2: 95%   Weight: 86.2 kg (190 lb)   Height: 5' 6\" (1.676 m)     Physical Exam  Vitals and nursing note reviewed. Constitutional:       General: She is not in acute distress. Appearance: She is well-developed. She is not diaphoretic. HENT:      Head: Normocephalic and atraumatic. Eyes:      Conjunctiva/sclera: Conjunctivae normal.   Cardiovascular:      Rate and Rhythm: Normal rate and regular rhythm. Heart sounds: Normal heart sounds. Pulmonary:      Effort: Pulmonary effort is normal. No respiratory distress. Breath sounds: Normal breath sounds. Chest:      Chest wall: No tenderness. Abdominal:      General: Bowel sounds are normal. There is no distension. Palpations: Abdomen is soft. Tenderness: There is no abdominal tenderness. There is no guarding or rebound. Musculoskeletal:         General: No deformity. Cervical back: Normal range of motion and neck supple. Skin:     General: Skin is warm and dry. Neurological:      Mental Status: She is alert and oriented to person, place, and time. Deep Tendon Reflexes: Reflexes are normal and symmetric. Psychiatric:         Attention and Perception: She perceives auditory hallucinations. Mood and Affect: Mood is anxious and depressed. Speech: Speech is delayed. Behavior: Behavior is withdrawn. Thought Content: Thought content includes suicidal ideation. Thought content does not include homicidal ideation. Thought content includes suicidal plan. Thought content does not include homicidal plan. Diagnostic Study Results     Labs -     Recent Results (from the past 12 hour(s))   DRUG SCREEN, URINE    Collection Time: 06/12/21  2:49 PM   Result Value Ref Range    BENZODIAZEPINES Negative NEG      BARBITURATES Negative NEG      THC (TH-CANNABINOL) Negative NEG      OPIATES Negative NEG      PCP(PHENCYCLIDINE) Negative NEG      COCAINE Negative NEG      AMPHETAMINES Negative NEG      METHADONE Negative NEG      HDSCOM (NOTE)    COVID-19 RAPID TEST    Collection Time: 06/12/21  2:49 PM   Result Value Ref Range    Specimen source Nasopharyngeal      COVID-19 rapid test Not detected NOTD     HCG QL SERUM    Collection Time: 06/12/21  3:00 PM   Result Value Ref Range    HCG, Ql. Negative NEG     ETHYL ALCOHOL    Collection Time: 06/12/21  3:00 PM   Result Value Ref Range    ALCOHOL(ETHYL),SERUM <3 0 - 3 MG/DL   CBC WITH AUTOMATED DIFF    Collection Time: 06/12/21  3:00 PM   Result Value Ref Range    WBC 8.0 4.6 - 13.2 K/uL    RBC 5.19 4.20 - 5.30 M/uL    HGB 14.7 12.0 - 16.0 g/dL    HCT 45.4 (H) 35.0 - 45.0 %    MCV 87.5 74.0 - 97.0 FL    MCH 28.3 24.0 - 34.0 PG    MCHC 32.4 31.0 - 37.0 g/dL    RDW 13.9 11.6 - 14.5 %    PLATELET 599 009 - 665 K/uL    MPV 10.3 9.2 - 11.8 FL    NEUTROPHILS 61 40 - 73 %    LYMPHOCYTES 27 21 - 52 %    MONOCYTES 7 3 - 10 %    EOSINOPHILS 3 0 - 5 %    BASOPHILS 1 0 - 2 %    ABS. NEUTROPHILS 4.9 1.8 - 8.0 K/UL    ABS. LYMPHOCYTES 2.1 0.9 - 3.6 K/UL    ABS. MONOCYTES 0.6 0.05 - 1.2 K/UL    ABS. EOSINOPHILS 0.3 0.0 - 0.4 K/UL    ABS.  BASOPHILS 0.1 0.0 - 0.1 K/UL    DF AUTOMATED     METABOLIC PANEL, COMPREHENSIVE    Collection Time: 06/12/21  3:00 PM   Result Value Ref Range    Sodium 139 136 - 145 mmol/L Potassium 3.8 3.5 - 5.5 mmol/L    Chloride 106 100 - 111 mmol/L    CO2 26 21 - 32 mmol/L    Anion gap 7 3.0 - 18 mmol/L    Glucose 205 (H) 74 - 99 mg/dL    BUN 10 7.0 - 18 MG/DL    Creatinine 1.40 (H) 0.6 - 1.3 MG/DL    BUN/Creatinine ratio 7 (L) 12 - 20      GFR est AA 46 (L) >60 ml/min/1.73m2    GFR est non-AA 38 (L) >60 ml/min/1.73m2    Calcium 9.0 8.5 - 10.1 MG/DL    Bilirubin, total 0.7 0.2 - 1.0 MG/DL    ALT (SGPT) 23 13 - 56 U/L    AST (SGOT) 14 10 - 38 U/L    Alk. phosphatase 157 (H) 45 - 117 U/L    Protein, total 8.0 6.4 - 8.2 g/dL    Albumin 4.3 3.4 - 5.0 g/dL    Globulin 3.7 2.0 - 4.0 g/dL    A-G Ratio 1.2 0.8 - 1.7     GLUCOSE, POC    Collection Time: 06/12/21  5:27 PM   Result Value Ref Range    Glucose (POC) 164 (H) 70 - 110 mg/dL       Radiologic Studies -   No orders to display     CT Results  (Last 48 hours)    None        CXR Results  (Last 48 hours)    None            Medical Decision Making   I am the first provider for this patient. I reviewed the vital signs, available nursing notes, past medical history, past surgical history, family history and social history. Vital Signs-Reviewed the patient's vital signs. Records Reviewed: Nursing Notes and Old Medical Records     Procedures: None   Procedures    Provider Notes (Medical Decision Making):     Differential Diagnosis: substance abuse, alcohol intoxication, substance withdrawal, acute psychotic episode, anxiety, panic disorder, yonis, undifferentiated schizophrenia, depression, SI, HI, medication non-compliance, other mood disorder        Plan: We will medically clear and consult crisis. Patient's blood pressure upon arrival was 184/84. States she is not currently on anything for her blood pressure. Will give her dose of clonidine and reassess. We will plan to consult crisis. 5:17 PM  Patient's creatinine is mildly elevated and blood sugar is elevated. Will order IV fluids and recheck.     5:56 PM : Pt care transferred to Gerardo Merlos NP ,ED provider. History of patient complaint(s), available diagnostic reports and current treatment plan has been discussed thoroughly. Bedside rounding on patient occured : no . Intended disposition of patient : Admit   Pending diagnostics reports and/or labs (please list): Repeat BG, BP and rehydration, crisis evaluation         MED RECONCILIATION:  Current Facility-Administered Medications   Medication Dose Route Frequency    sodium chloride 0.9 % bolus infusion 1,000 mL  1,000 mL IntraVENous ONCE     Current Outpatient Medications   Medication Sig    metFORMIN (GLUCOPHAGE) 500 mg tablet Take 500 mg by mouth two (2) times daily (with meals).  aspirin delayed-release 81 mg tablet Take  by mouth daily.  ondansetron hcl (Zofran) 4 mg tablet Take 4 mg by mouth every eight (8) hours as needed for Nausea or Vomiting.  loperamide (IMODIUM) 2 mg capsule Take 2 mg by mouth four (4) times daily as needed.  promethazine (PHENERGAN) 12.5 mg tablet Take  by mouth every six (6) hours as needed for Nausea.  albuterol (PROVENTIL HFA, VENTOLIN HFA, PROAIR HFA) 90 mcg/actuation inhaler Take 1 Puff by inhalation every four (4) hours as needed for Wheezing.  rosuvastatin (CRESTOR) 20 mg tablet Take 20 mg by mouth daily.  cyanocobalamin 1,000 mcg tablet Take 1,000 mcg by mouth daily.  tiotropium bromide (Spiriva Respimat) 2.5 mcg/actuation inhaler Take 2 Puffs by inhalation daily.  albuterol (PROVENTIL HFA, VENTOLIN HFA, PROAIR HFA) 90 mcg/actuation inhaler Take 2 Puffs by inhalation every four (4) hours as needed.  aspirin-calcium carbonate 81 mg-300 mg calcium(777 mg) tab Take 81 mg by mouth daily.  atorvastatin (LIPITOR) 80 mg tablet Take 80 mg by mouth daily.  glucose blood VI test strips (ASCENSIA AUTODISC VI, ONE TOUCH ULTRA TEST VI) strip Test once a day for DX E11.9    budesonide-formoteroL (SYMBICORT) 160-4.5 mcg/actuation HFAA Take 2 Puffs by inhalation daily.     DULoxetine (CYMBALTA) 60 mg capsule Take 60 mg by mouth daily.  lamoTRIgine (LaMICtal) 200 mg tablet Take 200 mg by mouth two (2) times a day.  meclizine (ANTIVERT) 25 mg tablet Take 25 mg by mouth daily as needed.  QUEtiapine (SEROquel) 100 mg tablet Take 200 mg by mouth daily.  traZODone (DESYREL) 100 mg tablet Take 200 mg by mouth nightly.  venlafaxine (EFFEXOR) 75 mg tablet Take 150 mg by mouth daily.  levothyroxine (SYNTHROID) 75 mcg tablet Take 100 mcg by mouth Daily (before breakfast). Indications: a condition with low thyroid hormone levels    cyclobenzaprine (FLEXERIL) 10 mg tablet Take 10 mg by mouth two (2) times a day. Indications: MUSCLE SPASM    lisinopril (PRINIVIL, ZESTRIL) 5 mg tablet Take 5 mg by mouth daily. Indications: HYPERTENSION    lovastatin (MEVACOR) 10 mg tablet Take 10 mg by mouth daily (after breakfast). Indications: HYPERCHOLESTEROLEMIA       Disposition:  TBD    Diagnosis     Clinical Impression:   1. Suicidal ideation    2. Auditory hallucination    3. Homelessness          \"Please note that this dictation was completed with Foradian, the computer voice recognition software. Quite often unanticipated grammatical, syntax, homophones, and other interpretive errors are inadvertently transcribed by the computer software. Please disregard these errors. Please excuse any errors that have escaped final proofreading. \"

## 2021-06-12 NOTE — ED NOTES
5:53 PM :Pt care assumed from Leeann Huynh, ED provider. Pt complaint(s), current treatment plan, progression and available diagnostic results have been discussed thoroughly. Rounding occurred: no  Intended Disposition: TBD  Pending diagnostic reports and/or labs (please list): Repeat BP    6:59 PM BP within normal limits. Medically cleared at this time. 7:08 PM Crisis aware of patient needing psych services. 8:01 PM Per Sekou Henson RN crisis nurse patient has been accepted for admission for acute psychiatric stabilization.     11:15 PM patient has been accepted at [place], [place]

## 2021-06-13 ENCOUNTER — HOSPITAL ENCOUNTER (INPATIENT)
Age: 61
LOS: 11 days | Discharge: HOME | DRG: 885 | End: 2021-06-24
Attending: PSYCHIATRY & NEUROLOGY | Admitting: PSYCHIATRY & NEUROLOGY
Payer: MEDICARE

## 2021-06-13 PROBLEM — F31.4 SEVERE DEPRESSED BIPOLAR I DISORDER WITHOUT PSYCHOTIC FEATURES (HCC): Status: ACTIVE | Noted: 2021-06-13

## 2021-06-13 PROBLEM — F31.9 BIPOLAR DISORDER (HCC): Status: ACTIVE | Noted: 2021-06-13

## 2021-06-13 LAB
ATRIAL RATE: 73 BPM
CALCULATED P AXIS, ECG09: 45 DEGREES
CALCULATED R AXIS, ECG10: -31 DEGREES
CALCULATED T AXIS, ECG11: 36 DEGREES
DIAGNOSIS, 93000: NORMAL
GLUCOSE BLD STRIP.AUTO-MCNC: 118 MG/DL (ref 65–117)
GLUCOSE BLD STRIP.AUTO-MCNC: 122 MG/DL (ref 65–117)
P-R INTERVAL, ECG05: 190 MS
Q-T INTERVAL, ECG07: 444 MS
QRS DURATION, ECG06: 100 MS
QTC CALCULATION (BEZET), ECG08: 489 MS
SERVICE CMNT-IMP: ABNORMAL
SERVICE CMNT-IMP: ABNORMAL
VENTRICULAR RATE, ECG03: 73 BPM

## 2021-06-13 PROCEDURE — 82962 GLUCOSE BLOOD TEST: CPT

## 2021-06-13 PROCEDURE — 99221 1ST HOSP IP/OBS SF/LOW 40: CPT | Performed by: PSYCHIATRY & NEUROLOGY

## 2021-06-13 PROCEDURE — 93005 ELECTROCARDIOGRAM TRACING: CPT

## 2021-06-13 PROCEDURE — 65220000003 HC RM SEMIPRIVATE PSYCH

## 2021-06-13 PROCEDURE — 74011250637 HC RX REV CODE- 250/637: Performed by: PSYCHIATRY & NEUROLOGY

## 2021-06-13 RX ORDER — QUETIAPINE FUMARATE 200 MG/1
200 TABLET, FILM COATED ORAL DAILY
Status: DISCONTINUED | OUTPATIENT
Start: 2021-06-13 | End: 2021-06-13

## 2021-06-13 RX ORDER — LORAZEPAM 2 MG/ML
1 INJECTION INTRAMUSCULAR
Status: DISCONTINUED | OUTPATIENT
Start: 2021-06-13 | End: 2021-06-24 | Stop reason: HOSPADM

## 2021-06-13 RX ORDER — VENLAFAXINE HYDROCHLORIDE 150 MG/1
150 CAPSULE, EXTENDED RELEASE ORAL
Status: DISCONTINUED | OUTPATIENT
Start: 2021-06-13 | End: 2021-06-24 | Stop reason: HOSPADM

## 2021-06-13 RX ORDER — ACETAMINOPHEN 325 MG/1
650 TABLET ORAL
Status: DISCONTINUED | OUTPATIENT
Start: 2021-06-13 | End: 2021-06-24 | Stop reason: HOSPADM

## 2021-06-13 RX ORDER — CYCLOBENZAPRINE HCL 10 MG
10 TABLET ORAL
Status: COMPLETED | OUTPATIENT
Start: 2021-06-13 | End: 2021-06-13

## 2021-06-13 RX ORDER — TRAZODONE HYDROCHLORIDE 50 MG/1
50 TABLET ORAL
Status: DISCONTINUED | OUTPATIENT
Start: 2021-06-13 | End: 2021-06-15

## 2021-06-13 RX ORDER — LISINOPRIL 5 MG/1
5 TABLET ORAL DAILY
Status: DISCONTINUED | OUTPATIENT
Start: 2021-06-13 | End: 2021-06-24 | Stop reason: HOSPADM

## 2021-06-13 RX ORDER — TRAZODONE HYDROCHLORIDE 100 MG/1
100 TABLET ORAL
Status: DISCONTINUED | OUTPATIENT
Start: 2021-06-13 | End: 2021-06-15

## 2021-06-13 RX ORDER — HYDROXYZINE PAMOATE 25 MG/1
25 CAPSULE ORAL
Status: DISCONTINUED | OUTPATIENT
Start: 2021-06-13 | End: 2021-06-24 | Stop reason: HOSPADM

## 2021-06-13 RX ORDER — LEVOTHYROXINE SODIUM 50 UG/1
100 TABLET ORAL
Status: DISCONTINUED | OUTPATIENT
Start: 2021-06-13 | End: 2021-06-24 | Stop reason: HOSPADM

## 2021-06-13 RX ORDER — MAG HYDROX/ALUMINUM HYD/SIMETH 200-200-20
30 SUSPENSION, ORAL (FINAL DOSE FORM) ORAL
Status: DISCONTINUED | OUTPATIENT
Start: 2021-06-13 | End: 2021-06-24 | Stop reason: HOSPADM

## 2021-06-13 RX ORDER — LAMOTRIGINE 100 MG/1
200 TABLET ORAL 2 TIMES DAILY
Status: DISCONTINUED | OUTPATIENT
Start: 2021-06-13 | End: 2021-06-24 | Stop reason: HOSPADM

## 2021-06-13 RX ORDER — CYCLOBENZAPRINE HCL 10 MG
10 TABLET ORAL 2 TIMES DAILY
Status: DISCONTINUED | OUTPATIENT
Start: 2021-06-13 | End: 2021-06-24 | Stop reason: HOSPADM

## 2021-06-13 RX ORDER — METFORMIN HYDROCHLORIDE 500 MG/1
500 TABLET ORAL 2 TIMES DAILY WITH MEALS
Status: DISCONTINUED | OUTPATIENT
Start: 2021-06-13 | End: 2021-06-24 | Stop reason: HOSPADM

## 2021-06-13 RX ORDER — LORAZEPAM 1 MG/1
1 TABLET ORAL
Status: DISCONTINUED | OUTPATIENT
Start: 2021-06-13 | End: 2021-06-24 | Stop reason: HOSPADM

## 2021-06-13 RX ORDER — ATORVASTATIN CALCIUM 40 MG/1
40 TABLET, FILM COATED ORAL DAILY
Status: DISCONTINUED | OUTPATIENT
Start: 2021-06-13 | End: 2021-06-24 | Stop reason: HOSPADM

## 2021-06-13 RX ORDER — ADHESIVE BANDAGE
30 BANDAGE TOPICAL DAILY PRN
Status: DISCONTINUED | OUTPATIENT
Start: 2021-06-13 | End: 2021-06-24 | Stop reason: HOSPADM

## 2021-06-13 RX ORDER — QUETIAPINE FUMARATE 200 MG/1
200 TABLET, FILM COATED ORAL
Status: DISCONTINUED | OUTPATIENT
Start: 2021-06-13 | End: 2021-06-24 | Stop reason: HOSPADM

## 2021-06-13 RX ORDER — GUAIFENESIN 100 MG/5ML
81 LIQUID (ML) ORAL DAILY
Status: DISCONTINUED | OUTPATIENT
Start: 2021-06-13 | End: 2021-06-24 | Stop reason: HOSPADM

## 2021-06-13 RX ADMIN — VENLAFAXINE HYDROCHLORIDE 150 MG: 150 CAPSULE, EXTENDED RELEASE ORAL at 10:24

## 2021-06-13 RX ADMIN — CYCLOBENZAPRINE 10 MG: 10 TABLET, FILM COATED ORAL at 17:29

## 2021-06-13 RX ADMIN — METFORMIN HYDROCHLORIDE 500 MG: 500 TABLET ORAL at 17:29

## 2021-06-13 RX ADMIN — LISINOPRIL 5 MG: 5 TABLET ORAL at 10:23

## 2021-06-13 RX ADMIN — LEVOTHYROXINE SODIUM 100 MCG: 0.05 TABLET ORAL at 10:22

## 2021-06-13 RX ADMIN — LAMOTRIGINE 200 MG: 100 TABLET ORAL at 17:29

## 2021-06-13 RX ADMIN — ATORVASTATIN CALCIUM 40 MG: 40 TABLET, FILM COATED ORAL at 10:22

## 2021-06-13 RX ADMIN — CYCLOBENZAPRINE 10 MG: 10 TABLET, FILM COATED ORAL at 04:31

## 2021-06-13 RX ADMIN — METFORMIN HYDROCHLORIDE 500 MG: 500 TABLET ORAL at 10:22

## 2021-06-13 RX ADMIN — ASPIRIN 81 MG CHEWABLE TABLET 81 MG: 81 TABLET CHEWABLE at 10:21

## 2021-06-13 RX ADMIN — QUETIAPINE FUMARATE 200 MG: 200 TABLET ORAL at 21:11

## 2021-06-13 RX ADMIN — ACETAMINOPHEN 650 MG: 325 TABLET ORAL at 12:10

## 2021-06-13 RX ADMIN — CYCLOBENZAPRINE 10 MG: 10 TABLET, FILM COATED ORAL at 10:23

## 2021-06-13 RX ADMIN — TRAZODONE HYDROCHLORIDE 100 MG: 100 TABLET ORAL at 21:11

## 2021-06-13 RX ADMIN — LAMOTRIGINE 200 MG: 100 TABLET ORAL at 10:21

## 2021-06-13 NOTE — ROUTINE PROCESS
Shift change report given to JIMMIE Zaragoza RN, re: SBAR, medications, and behavior. Jose Fall Risk Score - 3.

## 2021-06-13 NOTE — PROGRESS NOTES
Problem: Psychosis  Goal: *STG: Remains safe in hospital  Outcome: Progressing Towards Goal  Note: Affect blunted. Mood anxious/depressed. Uses walker to ambulate. Complaining frequently this am, beti about food. Ate over 50%. Dr. Jay Overlie in to see today. FSBS at 1555- 118. Med compliant. Tylenol 650mg po given at 1210 for back pain with relief.

## 2021-06-13 NOTE — ED NOTES
Report called to Yoselin Sloan RN at [phone] Sr [phone] here to transfer patient. Patient belongings given to transport. Patient in stable condition at time of transfer.

## 2021-06-13 NOTE — PROGRESS NOTES
Pt denied SI/HI. +AVH. Pt rested quietly in bed with eyes closed for 1 hour since arrival on the unit. Respirations even and unlabored. Pt in no apparent distress. Blood glucose level at 0625 - 122.

## 2021-06-13 NOTE — MASTER TREATMENT PLAN
[facility] 60  Master Treatment Plan for Marcel Levine Treatment Plan Initiated: 06/13/2021    Treatment Plan Modalities:  Type of Modality Amount  (x minutes) Frequency (x/week) Duration (x days) Name of Responsible Staff   [facility] meetings to encourage peer interactions 30 14 7 Belen Ya., CNA/MHT  Wes Dave., MHT   Group psychotherapy to assist in building coping skills and internal controls 60 5 5 [facility]., [facility]  Tere Rivero., [facility]   Therapeutic activity groups to build coping skills 61 7 7 Jarod Snyder., MHT     Psychoeducation in group setting to address:   Medication education  Coping Skills  Symptom Management   60 7 7 Luis Alvarado., [facility]  Tere Rivero., [facility]  Kandy Zaidi., JAY Aguilar RN   Discharge planning   60 2 2 Deo Dupont.,    Spirituality    60 2 2 Dorothy Banks.   Physician medication management   15 7 7 MD AZALIA Carter. Julian Cabot, MD                                       Treatment Team Signatures    I have participated in the development of this plan of treatment and agree to its implementation.     Patient Signature       Patient Printed Name Date/Time   Social Work/Therapist Signature       Social Work/Therapist Printed Name Date/Time   RN Signature       RN Printed Name      Holger Ramsay RN Date/Time      06/13/21/0417   Other Signature     Other Signature Date/Time   MD Signature       MD Printed Name Date/Time

## 2021-06-13 NOTE — BH NOTES
Affect blunted. Mood anxious/depressed. Uses walker to ambulate. Complaining frequently this am, beti about food. Ate over 50%. Dr. Blane Sagastume in to see today. FSBS at 1555- 118. Med compliant. Tylenol 650mg po given at 1210 for back pain with relief.
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
## 2021-06-13 NOTE — BH NOTES
Admission Note:  Pt arrived on unit at 0330 on a voluntary admission  from [facility] ED. Pt is alert and oriented x 4 and ambulating with the aid of a walker. Dr. Maribel Burks, and Nursing Supervisor notified. Pt denied having a license with the [address] Pt stated that she does not smoke cigarettes, use/abuse alcohol or illicit substances. Pt vapes. Treatment to focus on decreasing SI, decreasing depression, increasing coping skills, decreasing psychosis, mgmt of HTN, Medication mgmt, mgmt of Diabetes Mellitus,  and group therapy. Pt placed on suicide and fall precautions and q 15 minutes safety checks. Pt was provided a mask and received education including the benefits of wearing a mask while hospitalized. Pt was also encouraged to practice social distancing to ensure the safety of self, staff, and peers. Pt verbalized understanding.

## 2021-06-13 NOTE — BSMART NOTE
[facility], [phone], informed this writer that patient has been accepted for inpatient behavioral health services at [place], [place]. Patient made aware and patient is receptive to voluntary admission at this facility. Shaista Bema, also notified of patient's disposition.      Accepting Physician: Dr. Janak Davis    Number to call report: [phone], Room ([facility]-1)     Address: [address] [place] [address], [id number]

## 2021-06-13 NOTE — CONSULTS
[facility] Admission History & Physical        6/13/2021 11:56 AM  Patient: Laura Robles 1960  PCP: Khris Estrada NP    HISTORY  Chief Complaint: No chief complaint on file. HPI: 61 y.o. female presenting for admission to [facility] for further evaluation and treatment for Severe depressed bipolar I disorder without psychotic features ([facility] [address] She  has a past medical history of Asthma, chronic, moderate persistent, with acute exacerbation, Asthmatic bronchitis without complication (72/7/0505), Bipolar 1 disorder, manic, mild ([address] CAD (coronary artery disease), Controlled type 2 diabetes mellitus without complication, without long-term current use of insulin ([facility] [address] Diabetes ([address] Hyperlipidemia, Hypertension, Hypothyroidism, and Liver disease. .     Patient was accepted in transfer from [facility] urgency department where she presented with complaints of being homeless and no longer wanting to live. She had a suicidal plan of taking excessive pills and kept in her backpack. Did not attempted suicide. There were no homicidal symptoms. Mid to some auditory hallucinations and hearing voices telling her to kill her self. She is homeless, noting her friend was kicked out of her apartment for having too many residents living in the home. She denies use of alcohol or tobacco.    She has some past history for respiratory difficult T diagnosed as asthma on treatment with as needed bronchodilators-none used recently. She has been off her medications because of the lack of funds to purchase them. Not been able to monitor her blood glucose. She has no complaint of chest pain or dyspnea. He does not report any problems taking her medications that were prescribed previously.     In review of her long preadmission medical list the medicines taken include Glucophage 500 mg 2 tabs twice daily, aspirin 81 mg daily, Lipitor 80 mg daily, Synthroid 75 mcg daily, lisinopril 5 mg daily. He has taken occasional Tylenol for pain. Patient reports no acute illness at this time. Past Medical History:  Past Medical History:   Diagnosis Date    Asthma, chronic, moderate persistent, with acute exacerbation     Asthmatic bronchitis without complication 61/2/8767    Bipolar 1 disorder, manic, mild (HCC)     CAD (coronary artery disease)     Controlled type 2 diabetes mellitus without complication, without long-term current use of insulin (HCC)     Diabetes ([facility] [address]     Hyperlipidemia     Hypertension     Hypothyroidism     Liver disease        Past Surgical History:  Past Surgical History:   Procedure Laterality Date    HX APPENDECTOMY  10/1969    HX BACK SURGERY       neck& back surgery    HX CERVICAL LAMINECTOMY      HX CYST REMOVAL Left     [facility]'s    HX HYSTERECTOMY Bilateral 10/1990    HX KNEE ARTHROSCOPY Left     torn cartilage x 2       Medication:  (not UTD)  Prior to Admission medications    Medication Sig Start Date End Date Taking? Authorizing Provider   ondansetron hcl (Zofran) 4 mg tablet Take 4 mg by mouth every eight (8) hours as needed for Nausea or Vomiting. Yes Provider, Historical   nabumetone (RELAFEN) 500 mg tablet Take 500 mg by mouth two (2) times a day. 4/26/21   Other, MD Darryl   tiZANidine (ZANAFLEX) 4 mg tablet Take 4 mg by mouth every eight (8) hours as needed. Patient not taking: Reported on 6/13/2021 4/26/21   Other, MD Darryl   metFORMIN (GLUCOPHAGE) 1,000 mg tablet Take 1,000 mg by mouth two (2) times a day. Patient not taking: Reported on 6/13/2021    Other, MD Darryl   QUEtiapine (SEROquel) 200 mg tablet  3/27/21   Other, MD Darryl   metFORMIN (GLUCOPHAGE) 500 mg tablet Take 500 mg by mouth two (2) times daily (with meals). Provider, Historical   aspirin delayed-release 81 mg tablet Take  by mouth daily. Provider, Historical   loperamide (IMODIUM) 2 mg capsule Take 2 mg by mouth four (4) times daily as needed.   Patient not taking: Reported on 6/13/2021    Provider, Historical   promethazine (PHENERGAN) 12.5 mg tablet Take  by mouth every six (6) hours as needed for Nausea. Provider, Historical   albuterol (PROVENTIL HFA, VENTOLIN HFA, PROAIR HFA) 90 mcg/actuation inhaler Take 1 Puff by inhalation every four (4) hours as needed for Wheezing. 10/6/20   Yariel Soni MD   rosuvastatin (CRESTOR) 20 mg tablet Take 20 mg by mouth daily. Patient not taking: Reported on 6/13/2021 8/15/20   Provider, Historical   cyanocobalamin 1,000 mcg tablet Take 1,000 mcg by mouth daily. Patient not taking: Reported on 6/13/2021 9/28/20   Provider, Historical   tiotropium bromide (Spiriva Respimat) 2.5 mcg/actuation inhaler Take 2 Puffs by inhalation daily. Patient not taking: Reported on 6/13/2021 3/31/20   Yariel Soni MD   albuterol (PROVENTIL HFA, VENTOLIN HFA, PROAIR HFA) 90 mcg/actuation inhaler Take 2 Puffs by inhalation every four (4) hours as needed. Provider, Historical   aspirin-calcium carbonate 81 mg-300 mg calcium(777 mg) tab Take 81 mg by mouth daily. Patient not taking: Reported on 6/13/2021 6/4/13   Provider, Historical   atorvastatin (LIPITOR) 80 mg tablet Take 80 mg by mouth daily. 7/14/17   Provider, Historical   glucose blood VI test strips (ASCENSIA AUTODISC VI, ONE TOUCH ULTRA TEST VI) strip Test once a day for DX E11.9  Patient not taking: Reported on 6/13/2021 12/31/15   Provider, Historical   budesonide-formoteroL (SYMBICORT) 160-4.5 mcg/actuation HFAA Take 2 Puffs by inhalation daily. Provider, Historical   DULoxetine (CYMBALTA) 60 mg capsule Take 60 mg by mouth daily. Provider, Historical   lamoTRIgine (LaMICtal) 200 mg tablet Take 200 mg by mouth two (2) times a day. Provider, Historical   meclizine (ANTIVERT) 25 mg tablet Take 25 mg by mouth daily as needed. Provider, Historical   QUEtiapine (SEROquel) 100 mg tablet Take 200 mg by mouth daily.   Patient not taking: Reported on 6/13/2021    Provider, Historical traZODone (DESYREL) 100 mg tablet Take 200 mg by mouth nightly. Provider, Historical   venlafaxine (EFFEXOR) 75 mg tablet Take 150 mg by mouth daily. Provider, Historical   levothyroxine (SYNTHROID) 75 mcg tablet Take 100 mcg by mouth Daily (before breakfast). Indications: a condition with low thyroid hormone levels    Other, MD Darryl   cyclobenzaprine (FLEXERIL) 10 mg tablet Take 10 mg by mouth two (2) times a day. Indications: MUSCLE SPASM    Other, MD Darryl   lisinopril (PRINIVIL, ZESTRIL) 5 mg tablet Take 5 mg by mouth daily. Indications: HYPERTENSION    Other, MD Darryl   lovastatin (MEVACOR) 10 mg tablet Take 10 mg by mouth daily (after breakfast). Indications: HYPERCHOLESTEROLEMIA    Other, MD Darryl       Allergies:   Allergies   Allergen Reactions    Codeine Nausea and Vomiting and Nausea Only    Other Medication Other (comments)     Nicotine Patches - Serious \"burn like\" irritation on skin    Oxycodone-Acetaminophen Nausea and Vomiting, Nausea Only and Other (comments)     Pt can take tylenol  Pt can take tylenol      Carvedilol Other (comments)    Citalopram Unknown (comments) and Other (comments)     violent    Nicotine Other (comments)     Nicotine patches  Other reaction(s): toxic skin reaction  Nicotine patches      Tramadol Nausea Only and Other (comments)    Flu Vaccine Qs2014-15(36mo,Up) Nausea Only       Social History:  Social History     Tobacco Use    Smoking status: Current Every Day Smoker     Packs/day: 1.00     Years: 50.00     Pack years: 50.00     Types: Cigarettes     Start date: 6/17/1974    Smokeless tobacco: Never Used   Substance Use Topics    Alcohol use: Never    Drug use: Not Currently     Types: Marijuana     Comment: as a teenager       Family History:  Family History   Problem Relation Age of Onset    Cancer Mother     Heart Disease Father        ROS:  Total of 12 systems reviewed as follows:  POSITIVE= bolded text  Negative = text not bolded       General:  fever, chills, sweats, generalized weakness, weight loss/gain, loss of appetite   Eyes:    blurred vision, eye pain, loss of vision, double vision  ENT:    rhinorrhea, pharyngitis   Respiratory:  cough, sputum production, SOB, MENDEZ, wheezing, pleuritic pain   Cardiology:   chest pain, palpitations, orthopnea, PND, edema, syncope   Gastrointestinal:  abdominal pain , N/V, diarrhea, dysphagia, constipation, bleeding   Genitourinary:  frequency, urgency, dysuria, hematuria, incontinence, prostatism   Muskuloskeletal: arthralgia, myalgia, back pain  Hematology:   easy bruising, nose or gum bleeding, lymphadenopathy   Dermatological: rash, ulceration, pruritis, color change / jaundice  Endocrine:   hot flashes or polydipsia   Neurological:  headache, dizziness, confusion, focal weakness, paresthesia, speech difficulties, memory loss, gait difficulty  Psychological: feelings of anxiety, depression, agitation      PHYSICAL EXAM:  Patient Vitals for the past 24 hrs:   Temp Pulse Resp BP SpO2   06/13/21 0740 (!) 96.7 °F (35.9 °C) 83 19 (!) 142/77 95 %   06/13/21 0411 98.1 °F (36.7 °C) 76 17 (!) 155/76 98 %       General:    Alert, cooperative, no distress, appears stated age. HEENT: Atraumatic, anicteric sclerae, pink conjunctivae     No oral ulcers, mucosa moist, throat clear, dentition fair  Neck:  Supple, symmetrical;   thyroid non tender  Lungs:   Clear to auscultation bilaterally. No wheezing or rhonchi. No rales. Chest wall:  No tenderness. No accessory muscle use. Heart:   Regular rhythm. No  murmur. No edema  Abdomen:   Soft, non-tender. Not distended. Bowel sounds normal  Extremities: No cyanosis. No clubbing      Capillary refill normal,  Radial pulse 2+,  DP 2+  Skin:     Not pale. Not jaundiced. No rashes   Psych:  Depressed. Not anxious or agitated. No thought disturbance appreciated  Neurologic: EOMs intact. No facial asymmetry. No aphasia or slurred speech.     Symmetrical strength, Sensation grossly intact. Alert and oriented . Lab Data Reviewed:    Recent Results (from the past 24 hour(s))   DRUG SCREEN, URINE    Collection Time: 06/12/21  2:49 PM   Result Value Ref Range    BENZODIAZEPINES Negative NEG      BARBITURATES Negative NEG      THC (TH-CANNABINOL) Negative NEG      OPIATES Negative NEG      PCP(PHENCYCLIDINE) Negative NEG      COCAINE Negative NEG      AMPHETAMINES Negative NEG      METHADONE Negative NEG      HDSCOM (NOTE)    COVID-19 RAPID TEST    Collection Time: 06/12/21  2:49 PM   Result Value Ref Range    Specimen source Nasopharyngeal      COVID-19 rapid test Not detected NOTD     HCG QL SERUM    Collection Time: 06/12/21  3:00 PM   Result Value Ref Range    HCG, Ql. Negative NEG     ETHYL ALCOHOL    Collection Time: 06/12/21  3:00 PM   Result Value Ref Range    ALCOHOL(ETHYL),SERUM <3 0 - 3 MG/DL   CBC WITH AUTOMATED DIFF    Collection Time: 06/12/21  3:00 PM   Result Value Ref Range    WBC 8.0 4.6 - 13.2 K/uL    RBC 5.19 4.20 - 5.30 M/uL    HGB 14.7 12.0 - 16.0 g/dL    HCT 45.4 (H) 35.0 - 45.0 %    MCV 87.5 74.0 - 97.0 FL    MCH 28.3 24.0 - 34.0 PG    MCHC 32.4 31.0 - 37.0 g/dL    RDW 13.9 11.6 - 14.5 %    PLATELET 104 062 - 009 K/uL    MPV 10.3 9.2 - 11.8 FL    NEUTROPHILS 61 40 - 73 %    LYMPHOCYTES 27 21 - 52 %    MONOCYTES 7 3 - 10 %    EOSINOPHILS 3 0 - 5 %    BASOPHILS 1 0 - 2 %    ABS. NEUTROPHILS 4.9 1.8 - 8.0 K/UL    ABS. LYMPHOCYTES 2.1 0.9 - 3.6 K/UL    ABS. MONOCYTES 0.6 0.05 - 1.2 K/UL    ABS. EOSINOPHILS 0.3 0.0 - 0.4 K/UL    ABS.  BASOPHILS 0.1 0.0 - 0.1 K/UL    DF AUTOMATED     METABOLIC PANEL, COMPREHENSIVE    Collection Time: 06/12/21  3:00 PM   Result Value Ref Range    Sodium 139 136 - 145 mmol/L    Potassium 3.8 3.5 - 5.5 mmol/L    Chloride 106 100 - 111 mmol/L    CO2 26 21 - 32 mmol/L    Anion gap 7 3.0 - 18 mmol/L    Glucose 205 (H) 74 - 99 mg/dL    BUN 10 7.0 - 18 MG/DL    Creatinine 1.40 (H) 0.6 - 1.3 MG/DL    BUN/Creatinine ratio 7 (L) 12 - 20      GFR est AA 46 (L) >60 ml/min/1.73m2    GFR est non-AA 38 (L) >60 ml/min/1.73m2    Calcium 9.0 8.5 - 10.1 MG/DL    Bilirubin, total 0.7 0.2 - 1.0 MG/DL    ALT (SGPT) 23 13 - 56 U/L    AST (SGOT) 14 10 - 38 U/L    Alk. phosphatase 157 (H) 45 - 117 U/L    Protein, total 8.0 6.4 - 8.2 g/dL    Albumin 4.3 3.4 - 5.0 g/dL    Globulin 3.7 2.0 - 4.0 g/dL    A-G Ratio 1.2 0.8 - 1.7     GLUCOSE, POC    Collection Time: 06/12/21  5:27 PM   Result Value Ref Range    Glucose (POC) 164 (H) 70 - 110 mg/dL   GLUCOSE, POC    Collection Time: 06/13/21  6:25 AM   Result Value Ref Range    Glucose (POC) 122 (H) 65 - 117 mg/dL    Performed by Catarina Rich (RN)    EKG, 12 LEAD, INITIAL    Collection Time: 06/13/21  9:34 AM   Result Value Ref Range    Ventricular Rate 73 BPM    Atrial Rate 73 BPM    P-R Interval 190 ms    QRS Duration 100 ms    Q-T Interval 444 ms    QTC Calculation (Bezet) 489 ms    Calculated P Axis 45 degrees    Calculated R Axis -31 degrees    Calculated T Axis 36 degrees    Diagnosis       Normal sinus rhythm  Left axis deviation  Nonspecific T wave abnormality  Prolonged QT  Abnormal ECG  No previous ECGs available         EKG: NSR 73 bpm, LAD, NSTWC, Prolonged QT, no prev for comparison    Radiology:  None    Care Plan discussed with:   Patient x    Family     RN x         Consultant      Expected  Disposition:   Home with Family x   HH/PT/OT/RN    SNF/LTC    [place]      TOTAL TIME:  60 Minutes      Comments    x Reviewed previous records   >50% of visit spent in counseling and coordination of care x Discussion with patient and/or family and questions answered       _______________________________________________________  My assessment of this patient's clinical condition and my plan of care is as follows.     ASSESSMENT / PLAN    Principal Problem:    Severe depressed bipolar I disorder without psychotic features ([facility] [address] (6/13/2021)  Patient admitted to [facility] inpatient for evaluation and medication adjustments  Currently receiving Lamictal 200 mg twice daily, Seroquel 200 mg nightly, Trazodone 100 mg nightly, and Effexor  mg daily  Geodon as prescribed as needed well as Trazodone and Ativan    Active Problems:    Severe obesity ([facility] [address] (3/17/2020)  Gradual weight reduction would be ideal  To be assisted by diabetic treatment with Metformin      Diet-controlled type 2 diabetes mellitus ([facility] [address] (12/1/2019)  Patient had been on Metformin 500 mg 2 tabs twice daily  Have resumed Metformin 500 mg twice daily and monitor blood glucose before meals twice daily to make adjustments. Assess hemoglobin A1c. Hypertension (9/12/8679)  Mild systolic blood pressure elevation noted  Resume lisinopril 5 mg daily and follow. Some renal insufficiency is appreciated with an elevated creatinine to 1.4 on admission  TS    Hypothyroidism (2/20/2014)  Maintain current thyroid replacement treatment.   Thyroxine 100 mcg daily  Has been off treatment but will assess TSH to verify resuming treatment      Asthmatic bronchitis without complication (17/4/9730)  No current need for treatment, has used MDI inhalers in the past  Will need to order albuterol nebs if become symptomatic    SAFETY:   Code Status:Full  DVT prophylaxis:No anticoagulants on [facility]  Stress Ulcer prophylaxis: Not Indicated  Bladder catheter:no  Family Contact Info:  Primary Emergency Contact: Mayra Henry, [place] Phone: [phone]: [facility] Room 234/01  Disposition: TBD, likely home when stable  Admission status:  Inpatient [facility]    -Tentative plan of care discussed with patient / family, who demonstrated understanding and is in agreement to the above    Reese Mariee MD  [facility] Hospitalist  [phone]

## 2021-06-14 LAB
CHOLEST SERPL-MCNC: 175 MG/DL
EST. AVERAGE GLUCOSE BLD GHB EST-MCNC: 146 MG/DL
GLUCOSE BLD STRIP.AUTO-MCNC: 121 MG/DL (ref 65–117)
GLUCOSE BLD STRIP.AUTO-MCNC: 155 MG/DL (ref 65–117)
HBA1C MFR BLD: 6.7 % (ref 4–5.6)
HDLC SERPL-MCNC: 54 MG/DL
HDLC SERPL: 3.2 {RATIO} (ref 0–5)
LDLC SERPL CALC-MCNC: 75.2 MG/DL (ref 0–100)
SERVICE CMNT-IMP: ABNORMAL
SERVICE CMNT-IMP: ABNORMAL
TRIGL SERPL-MCNC: 229 MG/DL (ref ?–150)
TSH SERPL DL<=0.05 MIU/L-ACNC: 5.24 UIU/ML (ref 0.36–3.74)
VLDLC SERPL CALC-MCNC: 45.8 MG/DL

## 2021-06-14 PROCEDURE — 65220000003 HC RM SEMIPRIVATE PSYCH

## 2021-06-14 PROCEDURE — 74011250637 HC RX REV CODE- 250/637: Performed by: PSYCHIATRY & NEUROLOGY

## 2021-06-14 PROCEDURE — 80061 LIPID PANEL: CPT

## 2021-06-14 PROCEDURE — 99232 SBSQ HOSP IP/OBS MODERATE 35: CPT | Performed by: PSYCHIATRY & NEUROLOGY

## 2021-06-14 PROCEDURE — 82962 GLUCOSE BLOOD TEST: CPT

## 2021-06-14 PROCEDURE — 36415 COLL VENOUS BLD VENIPUNCTURE: CPT

## 2021-06-14 PROCEDURE — 83036 HEMOGLOBIN GLYCOSYLATED A1C: CPT

## 2021-06-14 PROCEDURE — 84443 ASSAY THYROID STIM HORMONE: CPT

## 2021-06-14 RX ADMIN — LAMOTRIGINE 200 MG: 100 TABLET ORAL at 17:58

## 2021-06-14 RX ADMIN — LEVOTHYROXINE SODIUM 100 MCG: 0.05 TABLET ORAL at 05:59

## 2021-06-14 RX ADMIN — LAMOTRIGINE 200 MG: 100 TABLET ORAL at 08:35

## 2021-06-14 RX ADMIN — VENLAFAXINE HYDROCHLORIDE 150 MG: 150 CAPSULE, EXTENDED RELEASE ORAL at 08:35

## 2021-06-14 RX ADMIN — TRAZODONE HYDROCHLORIDE 100 MG: 100 TABLET ORAL at 21:11

## 2021-06-14 RX ADMIN — QUETIAPINE FUMARATE 200 MG: 200 TABLET ORAL at 21:11

## 2021-06-14 RX ADMIN — METFORMIN HYDROCHLORIDE 500 MG: 500 TABLET ORAL at 08:35

## 2021-06-14 RX ADMIN — METFORMIN HYDROCHLORIDE 500 MG: 500 TABLET ORAL at 17:58

## 2021-06-14 RX ADMIN — ATORVASTATIN CALCIUM 40 MG: 40 TABLET, FILM COATED ORAL at 08:35

## 2021-06-14 RX ADMIN — LISINOPRIL 5 MG: 5 TABLET ORAL at 08:35

## 2021-06-14 RX ADMIN — ASPIRIN 81 MG CHEWABLE TABLET 81 MG: 81 TABLET CHEWABLE at 08:34

## 2021-06-14 RX ADMIN — ACETAMINOPHEN 650 MG: 325 TABLET ORAL at 17:58

## 2021-06-14 RX ADMIN — CYCLOBENZAPRINE 10 MG: 10 TABLET, FILM COATED ORAL at 08:35

## 2021-06-14 RX ADMIN — CYCLOBENZAPRINE 10 MG: 10 TABLET, FILM COATED ORAL at 17:58

## 2021-06-14 NOTE — BH NOTES
Affect blunted/restricted, mood depressed/anxious. Patient alert & oriented x 4. Patient denies SI/HI/AVH. No delusional statements made. Patient compliant with medications. Patient appetite good eats 100% of all diabetic meals plus snacks. Patient blood glucose ranges between 121-155. Patient attended and engaged in groups with prompting. Patient has unsteady gait and utilizes a walker. Patient in no apparent distress all vital signs within normal limits.
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
## 2021-06-14 NOTE — PROGRESS NOTES
Problem: Depressed Mood (Adult/Pediatric)    Goal: *STG: Attends activities and groups    Affect flat, mood depressed/anxious. Denied SI or intent to harm self. Attended and participated in group. Assessed/monitored potential harm to self. Encouraged sharing of feelings. Monitored medication compliance and effectiveness. Reinforced abilities and accomplishments. Offered encouragement, reassurance, and support.     Outcome: Progressing Towards Goal

## 2021-06-14 NOTE — BH NOTES
SOCIAL WORK PROGRESS NOTE    NAME:Troy Webb Cera  : 1960  MRN: [id number]      Patient presentation including presence/absence SI/HI, psychosis, ability to perform ADLs: she denies SI/HI, thoughts are organized, her mood is still depressed and irritable. She has been participating in groups    Concerns expressed by patient: feeling like family does not care    Family involvement: none    Resource needs: outpatient treatment, shelter    Strengths: able to care for self    Limitations: chronic homelessness    Other: patient reports not feeling quite as hopeless or distressed, patient will be restarted on medication and will likely be discharged to a shelter if she does not have anywhere else to go to.

## 2021-06-14 NOTE — BH NOTES
Shift change report given to [facility] JenaANTHONY CANCINO re: SBAR, medications, and behavior.   Jose fall risk score: (3)

## 2021-06-14 NOTE — GROUP NOTE
TU  GROUP DOCUMENTATION INDIVIDUAL                                                                          Group Therapy Note    Date: 6/13/2021    Group Start Time: 2000  Group End Time: 2045  Group Topic: Community Meeting    [facility] GROUP DOCUMENTATION GROUP    Group Therapy Note    Attendees: 6         Attendance: Attended    Patient's Goal:  To feel better    Interventions/techniques: Informed, Promoted peer support and Provide feedback    Follows Directions: Followed directions    Interactions: Interacted appropriately    Mental Status: Anxious    Behavior/appearance: Cooperative    Goals Achieved: Able to engage in interactions, Able to listen to others and Able to give feedback to another      Additional Notes:  Patient was encouraged to attend and participate in wrap-up group. Patient participated in group and snack then stated she had no pain, anxiety (6), no depression, and no suicidal thoughts.       Johnson Paez

## 2021-06-14 NOTE — PROGRESS NOTES
Physical Therapy Screening:    An [facility] screening referral was triggered for physical therapy based on results obtained during the nursing admission assessment. The patients chart was reviewed and the patient is appropriate for a skilled therapy evaluation if there is a decline in functional mobility from baseline. Please order a consult for physical therapy if you are in agreement and would like an evaluation to be completed. Thank you.     Masood Archer, PTA

## 2021-06-14 NOTE — ROUTINE PROCESS
Shift change report given to Minda Hicks RN, re: SBAR, medications, and behavior. Jose Fall Risk Score - 3.

## 2021-06-14 NOTE — BH NOTES
Affect flat, mood depressed/anxious. Attended and participated in group. Ate diabetic snacks. Denied SI/HI/VH. + AH. +paranoid delusions. Participated in the [employer]. Sat apart from peers. Using walker to aid with ambulation on the unit. Medication compliant. Co-operative. Pt rested quietly in bed with eyes closed for 8.25 hours. Respirations even and unlabored. Pt in no apparent distress. Blood glucose level at 0629 - 121.

## 2021-06-14 NOTE — BH NOTES
PSYCHOSOCIAL ASSESSMENT  :Patient identifying info:   Jayesh Miranda is a 61 y.o., female admitted 6/13/2021  3:05 AM     Presenting problem and precipitating factors: patient was suicidal with a plan due to being homeless and reporting that she has no support from family      Mental status assessment: disheveled, cooperative and tearful, no motor agitation, speech is normal rate and volume, depressed mood, affect is dysphoric and irritable    Strengths: has been caring for self    Collateral information: patient    Current psychiatric /substance abuse providers and contact info: Dr. Hector Kaba    Previous psychiatric/substance abuse providers and response to treatment:     Family history of mental illness or substance abuse: denies    Substance abuse history:  20 years ago  Social History     Tobacco Use    Smoking status: Current Every Day Smoker     Packs/day: 1.00     Years: 50.00     Pack years: 50.00     Types: Cigarettes     Start date: 6/17/1974    Smokeless tobacco: Never Used   Substance Use Topics    Alcohol use: Never       History of biomedical complications associated with substance abuse : n/a    Patient's current acceptance of treatment or motivation for change: n/a    Family constellation: she has family but did not go into detail about them other than they are not supportive    Is significant other involved? no      Describe support system: denies any support    Describe living arrangements and home environment: has been homeless, living with  Friend and recently all were evicted    Health issues:   Hospital Problems  Date Reviewed: 3/11/2021        Codes Class Noted POA    * (Principal) Severe depressed bipolar I disorder without psychotic features ([facility] ICD-10-CM: F31.4  ICD-9-CM: 296.53  6/13/2021         Asthmatic bronchitis without complication [facility]: Y86.960  ICD-9-CM: 493.90  10/6/2020 Yes        Severe obesity ([facility] ICD-10-CM: E66.01  ICD-9-CM: 278.01  3/17/2020 Yes        Diet-controlled type 2 diabetes mellitus ([facility] ICD-10-CM: E11.9  ICD-9-CM: 250.00  2019 Yes        Hypertension ICD-10-CM: I10  ICD-9-CM: 401.9  2014 Yes        Hypothyroidism ICD-10-CM: E03.9  ICD-9-CM: 244.9  2014 Yes              Trauma history: denies    Legal issues: denies    History of  service: denies    Financial status: SSDI    Rastafari/cultural factors: none expressed    Education/work history: 2 years of college, worked as a CNA and [employer]    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

## 2021-06-14 NOTE — PROGRESS NOTES
INTERVAL Hx:  Records and clinical information were reviewed. States she's feeling \"OK\" this AM, and that she's not quite as distressed or hopeless like she was PTA. However, she's still angry that she has no support, not recognizing that she's likely burned most all of her bridges with family and friends over 5 years of homelessness. Tolerating the meds well--no SE's reported or noted including no rash. Started to discuss the option of going to a shelter, and she wasn't completely opposed to it. Slept 8.25 hours last night.     MEDS:  Current Facility-Administered Medications   Medication Dose Route Frequency    ziprasidone (GEODON) 10 mg in sterile water (preservative free) 0.5 mL injection  10 mg IntraMUSCular BID PRN    LORazepam (ATIVAN) injection 1 mg  1 mg Oral Q6H PRN    LORazepam (ATIVAN) tablet 1 mg  1 mg Oral Q6H PRN    traZODone (DESYREL) tablet 50 mg  50 mg Oral QHS PRN    hydrOXYzine pamoate (VISTARIL) capsule 25 mg  25 mg Oral TID PRN    magnesium hydroxide (MILK OF MAGNESIA) 400 mg/5 mL oral suspension 30 mL  30 mL Oral DAILY PRN    alum-mag hydroxide-simeth (MYLANTA) oral suspension 30 mL  30 mL Oral Q4H PRN    metFORMIN (GLUCOPHAGE) tablet 500 mg  500 mg Oral BID WITH MEALS    aspirin chewable tablet 81 mg  81 mg Oral DAILY    atorvastatin (LIPITOR) tablet 40 mg  40 mg Oral DAILY    lamoTRIgine (LaMICtal) tablet 200 mg  200 mg Oral BID    traZODone (DESYREL) tablet 100 mg  100 mg Oral QHS    venlafaxine-SR (EFFEXOR-XR) capsule 150 mg  150 mg Oral DAILY WITH BREAKFAST    levothyroxine (SYNTHROID) tablet 100 mcg  100 mcg Oral 6am    cyclobenzaprine (FLEXERIL) tablet 10 mg  10 mg Oral BID    lisinopriL (PRINIVIL, ZESTRIL) tablet 5 mg  5 mg Oral DAILY    QUEtiapine (SEROquel) tablet 200 mg  200 mg Oral QHS    acetaminophen (TYLENOL) tablet 650 mg  650 mg Oral Q4H PRN       VITALS:   Patient Vitals for the past 12 hrs:   Temp Pulse Resp BP SpO2   06/14/21 0726 97.6 °F (36.4 °C) 75 17 (!) 106/51 95 %       LABS: .  Recent Results (from the past 24 hour(s))   GLUCOSE, POC    Collection Time: 06/13/21  3:55 PM   Result Value Ref Range    Glucose (POC) 118 (H) 65 - 117 mg/dL    Performed by Kvng VIDES WITH EAG    Collection Time: 06/14/21  6:08 AM   Result Value Ref Range    Hemoglobin A1c 6.7 (H) 4.0 - 5.6 %    Est. average glucose 146 mg/dL   LIPID PANEL    Collection Time: 06/14/21  6:08 AM   Result Value Ref Range    Cholesterol, total 175 <200 MG/DL    Triglyceride 229 (H) <150 MG/DL    HDL Cholesterol 54 MG/DL    LDL, calculated 75.2 0 - 100 MG/DL    VLDL, calculated 45.8 MG/DL    CHOL/HDL Ratio 3.2 0.0 - 5.0     TSH 3RD GENERATION    Collection Time: 06/14/21  6:08 AM   Result Value Ref Range    TSH 5.24 (H) 0.36 - 3.74 uIU/mL   GLUCOSE, POC    Collection Time: 06/14/21  6:29 AM   Result Value Ref Range    Glucose (POC) 121 (H) 65 - 117 mg/dL    Performed by Gabriele Reyes (RN)        MSE:   Appearance: casually dressed; fair grooming  Behavior: calm and cooperative; no agitation  Motor: normal  Mood/Affect: slightly dysphoric and irritable  Thought Process: coherent; organized  Thought Content: no delusions; no SI/HI  Perceptions: no hallucinations  Insight/Judgment: fair    ASSESSMENT:   1. Bipolar disorder, depressed, severe (F31.4)  2.  Cluster B Personality traits (F60.9)  3. 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 Continue the Effexor XR at 150 mg daily. 2. Continue the Seroquel at 200 mg QHS and Lamictal at 200 mg BID. 3. Continue the PRN Trazodone and Vistaril. 4. ELOS: 4-5 days.

## 2021-06-14 NOTE — PROGRESS NOTES
Problem: Falls - Risk of  Goal: *Absence of Falls  Description: Document Tracey Dejesus Fall Risk and appropriate interventions in the flowsheet.   Outcome: Progressing Towards Goal  Note: Fall Risk Interventions:  Mobility Interventions: Utilize walker, cane, or other assistive device         Medication Interventions: Teach patient to arise slowly         History of Falls Interventions: Door open when patient unattended         Problem: Patient Education: Go to Patient Education Activity  Goal: Patient/Family Education  Outcome: Progressing Towards Goal

## 2021-06-14 NOTE — GROUP NOTE
TU  GROUP DOCUMENTATION INDIVIDUAL                                                                          Group Therapy Note    Date: 6/14/2021    Group Start Time: 1030  Group End Time: 1100  Group Topic: Community Meeting    [address] [facility] [address] [facility] GROUP DOCUMENTATION GROUP    Group Therapy Note    Attendees: 6           Attendance: Attended    Patient's Goal:  No goal for today    Interventions/techniques: Provide feedback    Follows Directions: Followed directions    Interactions: Interacted appropriately    Mental Status: Depressed    Behavior/appearance: Withdrawn/quiet    Goals Achieved: Able to listen to others      Additional Notes:  Patient appetite was good, physical pain lower back level 8, no suicidal thoughts.  Patient has depression and anxiety level 9    Andrés Jimenez

## 2021-06-15 LAB
GLUCOSE BLD STRIP.AUTO-MCNC: 114 MG/DL (ref 65–117)
GLUCOSE BLD STRIP.AUTO-MCNC: 124 MG/DL (ref 65–117)
LAMOTRIGINE SERPL-MCNC: 14.8 UG/ML (ref 2–20)
SERVICE CMNT-IMP: ABNORMAL
SERVICE CMNT-IMP: NORMAL

## 2021-06-15 PROCEDURE — 82962 GLUCOSE BLOOD TEST: CPT

## 2021-06-15 PROCEDURE — 65220000003 HC RM SEMIPRIVATE PSYCH

## 2021-06-15 PROCEDURE — 99231 SBSQ HOSP IP/OBS SF/LOW 25: CPT | Performed by: PSYCHIATRY & NEUROLOGY

## 2021-06-15 PROCEDURE — 74011250637 HC RX REV CODE- 250/637: Performed by: PSYCHIATRY & NEUROLOGY

## 2021-06-15 RX ORDER — TRAZODONE HYDROCHLORIDE 100 MG/1
100 TABLET ORAL
Status: DISCONTINUED | OUTPATIENT
Start: 2021-06-15 | End: 2021-06-24 | Stop reason: HOSPADM

## 2021-06-15 RX ADMIN — CYCLOBENZAPRINE 10 MG: 10 TABLET, FILM COATED ORAL at 17:26

## 2021-06-15 RX ADMIN — ACETAMINOPHEN 650 MG: 325 TABLET ORAL at 21:33

## 2021-06-15 RX ADMIN — METFORMIN HYDROCHLORIDE 500 MG: 500 TABLET ORAL at 08:35

## 2021-06-15 RX ADMIN — ATORVASTATIN CALCIUM 40 MG: 40 TABLET, FILM COATED ORAL at 08:35

## 2021-06-15 RX ADMIN — METFORMIN HYDROCHLORIDE 500 MG: 500 TABLET ORAL at 17:26

## 2021-06-15 RX ADMIN — LAMOTRIGINE 200 MG: 100 TABLET ORAL at 17:26

## 2021-06-15 RX ADMIN — QUETIAPINE FUMARATE 200 MG: 200 TABLET ORAL at 21:33

## 2021-06-15 RX ADMIN — CYCLOBENZAPRINE 10 MG: 10 TABLET, FILM COATED ORAL at 08:35

## 2021-06-15 RX ADMIN — VENLAFAXINE HYDROCHLORIDE 150 MG: 150 CAPSULE, EXTENDED RELEASE ORAL at 08:35

## 2021-06-15 RX ADMIN — LISINOPRIL 5 MG: 5 TABLET ORAL at 08:35

## 2021-06-15 RX ADMIN — LAMOTRIGINE 200 MG: 100 TABLET ORAL at 08:35

## 2021-06-15 RX ADMIN — LEVOTHYROXINE SODIUM 100 MCG: 0.05 TABLET ORAL at 06:14

## 2021-06-15 RX ADMIN — ASPIRIN 81 MG CHEWABLE TABLET 81 MG: 81 TABLET CHEWABLE at 08:35

## 2021-06-15 NOTE — ROUTINE PROCESS
Shift change report given to Adrien Jara Rn, re: SBAR. Medications, and behavior.   Jose Fall risk Score (3)

## 2021-06-15 NOTE — GROUP NOTE
TU  GROUP DOCUMENTATION INDIVIDUAL                                                                          Group Therapy Note    Date: 6/15/2021    Group Start Time: 1030  Group End Time: 1100  Group Topic: Community Meeting    [address] [facility] [address] [facility] GROUP DOCUMENTATION GROUP    Group Therapy Note    Attendees: 6         Attendance: Attended    Patient's Goal:  To take a shower today    Interventions/techniques: Provide feedback    Follows Directions: Followed directions    Interactions: Interacted appropriately    Mental Status: Depressed    Behavior/appearance: Cooperative    Goals Achieved: Able to listen to others      Additional Notes:  Patient appetite was good, patient has back pain level 6, no suicidal thoughts. Patient does have depression and anxiety.     Ange Calderon

## 2021-06-15 NOTE — BH NOTES
PRN Medication Documentation    Specific patient behavior that led to need for PRN medication: Patient c/o having lower back pain 4/10  Staff interventions attempted prior to PRN being given: Assessment done. RN offered PRN Tylenol. PRN medication given: Tylenol 650 mg po given @ 1758. Patient response/effectiveness of PRN medication: Some positive results noted.

## 2021-06-15 NOTE — BH NOTES
Behavioral Health Interdisciplinary Rounds     Patient Name: Rik Olson  Age: 61 y.o. Room/Bed:  234/01  Primary Diagnosis: Severe depressed bipolar I disorder without psychotic features ([facility]   Admission Status: Voluntary     Readmission within 30 days: no  Power of  in place: no  Patient requires a blocked bed: no          Reason for blocked bed:     VTE Prophylaxis: Not indicated    Mobility needs/Fall risk: yes  Flu Vaccine : no   Nutritional Plan: no  Consults: no         Labs/Testing due today?: no    Sleep hours: 8.5       Participation in Care/Groups:  yes  Medication Compliant?: Yes  PRNS (last 24 hours): Pain    Restraints (last 24 hours):  no     CIWA (range last 24 hours):     COWS (range last 24 hours):      Alcohol screening (AUDIT) completed -   AUDIT Score: 0     If applicable, date SBIRT discussed in treatment team AND documented:   AUDIT Screen Score: AUDIT Score: 0      Document Brief Intervention (corresponds directly with the 5 A's, Ask, Advise, Assess, Assist, and Arrange): At- Risk Patients (Score 7-15 for women; 8-15 for men)  Discuss concern patient is drinking at unhealthy levels known to increase risk of alcohol-related health problems. Is Patient ready to commit to change? If No:   Encourage reflection   Discuss short term and long term health risks of consuming alcohol   Barriers to change   Reaffirm willingness to help / Educational materials provided  If Yes:   Set goal  ison furniture provided    Harmful use or Dependence (Score 16 or greater)   Discuss short term and long term health risks of consuming alcohol   Recommendations   Negotiate drinking goal   Recommend addiction specialist/center   Arrange follow-up appointments.     Tobacco - patient is a smoker: Have You Used Tobacco in the Past 30 Days: No  Illegal Drugs use: Have You Used Any Illegal Substances Over the Past 12 Months: No    24 hour chart check complete: yes Patient goal(s) for today: to take a shower  Treatment team focus/goals: remain safe in the hospital  Progress note she reports feeling about the same not as hopeless, lack of support and housing is a constant trigger for her    LOS:  2  Expected LOS: 3-5    Financial concerns/prescription coverage:  no  Family contact: no      Family requesting physician contact today:  no  Discharge plan: shelter  Access to weapons : no        Outpatient provider(s): yes  Patient's preferred phone number for follow up call : [phone]    Participating treatment team members: Nhi Carlos, * (assigned SW), [facility]

## 2021-06-15 NOTE — PROGRESS NOTES
Problem: Psychosis  Goal: *STG: Remains safe in hospital  Outcome: Progressing Towards Goal  Goal: *STG: Participates in individual and group therapy  Outcome: Progressing Towards Goal  Goal: *STG/LTG: Complies with medication therapy  Outcome: Progressing Towards Goal  Goal: *STG/LTG: Demonstrates improved thought patterns as evidenced by logical and coherent speech  Outcome: Progressing Towards Goal     Problem: Patient Education: Go to Patient Education Activity  Goal: Patient/Family Education  Outcome: Progressing Towards Goal

## 2021-06-15 NOTE — BH NOTES
Affect blunted, mood depressed. Pt did not attend or participate in scheduled group activities. Pt remained isolative to her room this evening. Pt denies SI/HI/AVH/Pain. Pt refused snack. Pt was compliant with medications and did not request a PRN. Pt is in no apparent distress at this time and made no delusional statements. Pt continues to utilize a walker for assistance with ambulation and staff continues to offer assistance as needed. Pt rested in her bed with her eyes closed for 8.5 hours.

## 2021-06-15 NOTE — GROUP NOTE
[facility] GROUP DOCUMENTATION INDIVIDUAL                                                                          Group Therapy Note    Date: 6/15/2021    Group Start Time: 0930  Group End Time: 1993  Group Topic: Process Group - Inpatient    [address] OP    [place], [address] [address]    IP [address] [facility] GROUP DOCUMENTATION GROUP    Group Therapy Note    Attendees: Focus of session was on developing healthy boundaries with others and the signs of healthy and unhealthy boundaries. We explored current relationships with group members and identified what kinds of boundaries are present and what boundaries need to be set. We spoke about physical, emotional, time, and intimate boundaries that we can set. We also spoke about how to allow others to set boundaries with us and to work to equip them with how to support in times of need and/or crisis. We identified what changes we can make today. Attendance: Attended    Patient's Goal:       Accept constructive criticism without injury or isolation             Interventions/techniques: Challenged, Informed, Promoted peer support and Supported    Follows Directions: Followed directions    Interactions: Interacted appropriately    Mental Status: Anxious, Congruent and Preoccupied    Behavior/appearance: Attentive, Cooperative, Neatly groomed and Needed prompting    Goals Achieved: Able to engage in interactions, Able to receive feedback, Able to self-disclose, Discussed coping, Identified feelings, Identified triggers and Identified relapse prevention strategies      Additional Notes:  Azam Pathak did participate in group with prompting. She spoke about how she did not want to door closed during session but she was able to hear it was about privacy. She spoke about how she has no family that can or will support her and she continues to be blaming of others about her current situation. She does not want to go to a shelter due to lack of trust of people there.   She was able to take in some feedback but she appears content with \"[place]ing to the woods and being on my own completely. \"      Epifanio Haines

## 2021-06-15 NOTE — ROUTINE PROCESS
Shift change report given to Aida Humphries Rn, re: SBAR. Medications, and behavior.   Jose Fall Risk Score (3)

## 2021-06-15 NOTE — PROGRESS NOTES
Problem: Falls - Risk of  Goal: *Absence of Falls  Description: Document Elvie Kiser Fall Risk and appropriate interventions in the flowsheet.   Outcome: Progressing Towards Goal  Note: Fall Risk Interventions:  Mobility Interventions: Utilize walker, cane, or other assistive device         Medication Interventions: Teach patient to arise slowly         History of Falls Interventions: Door open when patient unattended         Problem: Psychosis  Goal: *STG: Remains safe in hospital  Outcome: Progressing Towards Goal

## 2021-06-15 NOTE — BH NOTES
Shift change report given to [facility] AtkaANTHONY CANCINO re: SBAR, medications, and behavior.   Jose fall risk score: (3)

## 2021-06-15 NOTE — PROGRESS NOTES
INTERVAL Hx:  Records and clinical information were reviewed. States she's feeling \"the same\" this AM, but that she's not quite as depressed or hopeless as she was PTA. Her lack of support and stable housing remains the trigger and we further discussed her options. She wants to stay in the [place] area, but doesn't have anyone she can stay with so a shelter may be the only option. Tolerating the meds well--no SE's reported or noted including no rash getting back on the Lamictal. Slept 8.5 hours last night. Glucs: 114-155 the past 24 hours.     MEDS:  Current Facility-Administered Medications   Medication Dose Route Frequency    traZODone (DESYREL) tablet 100 mg  100 mg Oral QHS PRN    ziprasidone (GEODON) 10 mg in sterile water (preservative free) 0.5 mL injection  10 mg IntraMUSCular BID PRN    LORazepam (ATIVAN) injection 1 mg  1 mg Oral Q6H PRN    LORazepam (ATIVAN) tablet 1 mg  1 mg Oral Q6H PRN    hydrOXYzine pamoate (VISTARIL) capsule 25 mg  25 mg Oral TID PRN    magnesium hydroxide (MILK OF MAGNESIA) 400 mg/5 mL oral suspension 30 mL  30 mL Oral DAILY PRN    alum-mag hydroxide-simeth (MYLANTA) oral suspension 30 mL  30 mL Oral Q4H PRN    metFORMIN (GLUCOPHAGE) tablet 500 mg  500 mg Oral BID WITH MEALS    aspirin chewable tablet 81 mg  81 mg Oral DAILY    atorvastatin (LIPITOR) tablet 40 mg  40 mg Oral DAILY    lamoTRIgine (LaMICtal) tablet 200 mg  200 mg Oral BID    venlafaxine-SR (EFFEXOR-XR) capsule 150 mg  150 mg Oral DAILY WITH BREAKFAST    levothyroxine (SYNTHROID) tablet 100 mcg  100 mcg Oral 6am    cyclobenzaprine (FLEXERIL) tablet 10 mg  10 mg Oral BID    lisinopriL (PRINIVIL, ZESTRIL) tablet 5 mg  5 mg Oral DAILY    QUEtiapine (SEROquel) tablet 200 mg  200 mg Oral QHS    acetaminophen (TYLENOL) tablet 650 mg  650 mg Oral Q4H PRN       VITALS:   Patient Vitals for the past 12 hrs:   Temp Pulse Resp BP SpO2   06/15/21 0754 (!) 96.5 °F (35.8 °C) 86 18 (!) 118/52 95 %       LABS: .  Recent Results (from the past 24 hour(s))   GLUCOSE, POC    Collection Time: 06/14/21  4:34 PM   Result Value Ref Range    Glucose (POC) 155 (H) 65 - 117 mg/dL    Performed by Stephan Reid (JAY)    GLUCOSE, POC    Collection Time: 06/15/21  6:19 AM   Result Value Ref Range    Glucose (POC) 114 65 - 117 mg/dL    Performed by Davon Goins RN        MSE:   Appearance: casually dressed; fair grooming  Behavior: calm and cooperative; no agitation  Motor: normal  Mood/Affect: slightly dysphoric; less irritable  Thought Process: coherent; organized  Thought Content: no delusions; no SI/HI  Perceptions: no hallucinations  Insight/Judgment: fair    ASSESSMENT:   1. Bipolar disorder, depressed, severe (F31.4)  2.  Cluster B Personality traits (F60.9)  3. 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 Continue the Effexor XR at 150 mg daily. 2. Continue the Seroquel at 200 mg QHS and Lamictal at 200 mg BID. 3. Continue the PRN Trazodone and Vistaril. 4. ELOS: 3-4 days.

## 2021-06-15 NOTE — BH NOTES
Affect blunted/restricted, mood depressed/anxious. Patient alert & oriented x 4. Patient speech clear and coherent. Patient answers questions appropriately. Patient denies SI/HI/AVH. No delusional statements made. No agitation or aggression noted. Patient is compliant with medications and no PRN's given. Patient appetite good eats 100% of all diabetic meals plus snacks. Patient blood glucose ranges between 114-124. Patient has unsteady gait and utilizes a walker. Patient outside with staff and peers for fresh air break. Patient in no apparent distress all vital signs within normal limits.

## 2021-06-16 LAB
GLUCOSE BLD STRIP.AUTO-MCNC: 114 MG/DL (ref 65–117)
GLUCOSE BLD STRIP.AUTO-MCNC: 140 MG/DL (ref 65–117)
SERVICE CMNT-IMP: ABNORMAL
SERVICE CMNT-IMP: NORMAL

## 2021-06-16 PROCEDURE — 82962 GLUCOSE BLOOD TEST: CPT

## 2021-06-16 PROCEDURE — 74011250637 HC RX REV CODE- 250/637: Performed by: PSYCHIATRY & NEUROLOGY

## 2021-06-16 PROCEDURE — 99231 SBSQ HOSP IP/OBS SF/LOW 25: CPT | Performed by: PSYCHIATRY & NEUROLOGY

## 2021-06-16 PROCEDURE — 65220000003 HC RM SEMIPRIVATE PSYCH

## 2021-06-16 RX ADMIN — LAMOTRIGINE 200 MG: 100 TABLET ORAL at 18:05

## 2021-06-16 RX ADMIN — CYCLOBENZAPRINE 10 MG: 10 TABLET, FILM COATED ORAL at 09:23

## 2021-06-16 RX ADMIN — LAMOTRIGINE 200 MG: 100 TABLET ORAL at 09:23

## 2021-06-16 RX ADMIN — HYDROXYZINE PAMOATE 25 MG: 25 CAPSULE ORAL at 16:16

## 2021-06-16 RX ADMIN — METFORMIN HYDROCHLORIDE 500 MG: 500 TABLET ORAL at 18:05

## 2021-06-16 RX ADMIN — LISINOPRIL 5 MG: 5 TABLET ORAL at 09:23

## 2021-06-16 RX ADMIN — ATORVASTATIN CALCIUM 40 MG: 40 TABLET, FILM COATED ORAL at 09:23

## 2021-06-16 RX ADMIN — CYCLOBENZAPRINE 10 MG: 10 TABLET, FILM COATED ORAL at 18:05

## 2021-06-16 RX ADMIN — VENLAFAXINE HYDROCHLORIDE 150 MG: 150 CAPSULE, EXTENDED RELEASE ORAL at 09:23

## 2021-06-16 RX ADMIN — METFORMIN HYDROCHLORIDE 500 MG: 500 TABLET ORAL at 09:23

## 2021-06-16 RX ADMIN — LEVOTHYROXINE SODIUM 100 MCG: 0.05 TABLET ORAL at 06:23

## 2021-06-16 RX ADMIN — QUETIAPINE FUMARATE 200 MG: 200 TABLET ORAL at 21:15

## 2021-06-16 RX ADMIN — ASPIRIN 81 MG CHEWABLE TABLET 81 MG: 81 TABLET CHEWABLE at 09:23

## 2021-06-16 RX ADMIN — ACETAMINOPHEN 650 MG: 325 TABLET ORAL at 16:16

## 2021-06-16 NOTE — BH NOTES
Patient was encouraged to attend and participate in community meeting. Patient was asleep during group and snack.

## 2021-06-16 NOTE — PROGRESS NOTES
Problem: Psychosis  Goal: *STG: Decreased hallucinations  Outcome: Progressing Towards Goal  Goal: *STG: Decreased delusional thinking  Outcome: Progressing Towards Goal

## 2021-06-16 NOTE — PROGRESS NOTES
Problem: Falls - Risk of  Goal: *Absence of Falls  Description: Document Lewis Adolof Fall Risk and appropriate interventions in the flowsheet.   Outcome: Progressing Towards Goal  Note: Fall Risk Interventions:  Mobility Interventions: Utilize walker, cane, or other assistive device         Medication Interventions: Teach patient to arise slowly    Elimination Interventions: Stay With Me (per policy)    History of Falls Interventions: Door open when patient unattended         Problem: Psychosis  Goal: *STG: Remains safe in hospital  Outcome: Progressing Towards Goal

## 2021-06-16 NOTE — BH NOTES
Affect constricted, mood depressed/anxious. Alert and oriented X 4. Cooperative and pleasant. Attended and participated minimally in outside patio group this afternoon. Interacts with staff. Makes needs known. Ate all meals and snacks. Denied SI/HI/AVH and pain. Medication compliant. Stable with no s/s of distress. She is concerned about where she will live after discharge. 16:15 complained of back pain and anxiety after taking a nap earlier. PRN Medication Documentation    Specific patient behavior that led to need for PRN medication: back ache  Staff interventions attempted prior to PRN being given: rest  PRN medication given: Tylenol 650 mg. PO for back pain  Patient response/effectiveness of PRN medication: improved and sitting in day room.     PRN Medication Documentation    Specific patient behavior that led to need for PRN medication: anxiety  Staff interventions attempted prior to PRN being given: therapeutic conversation  PRN medication given: Visteril 25 mg. PO anxiety  Patient response/effectiveness of PRN medication: calm

## 2021-06-16 NOTE — ROUTINE PROCESS
Shift change report given to Justyna Moses RN re: SBAR, medications, and behavior. Jose fall risk score 3.

## 2021-06-16 NOTE — PROGRESS NOTES
INTERVAL Hx:  Records and clinical information were reviewed. States she's feeling \"OK\" overall, and that she's not quite as depressed or hopeless as she was PTA. Still stressed by the lack of a stable housing situation which we discussed further. She wants to eventually move into an [facility] or -type setting, but she also had unrealistic expectations about how quickly that can happen. Still would like to stay in the [facility], but says the [facility] area would be fine too. Tolerating the meds well--no SE's reported or noted including no rash getting back on the Lamictal. Slept 8.25 hours last night. Glucs: 114-140 the past 24 hours.     MEDS:  Current Facility-Administered Medications   Medication Dose Route Frequency    traZODone (DESYREL) tablet 100 mg  100 mg Oral QHS PRN    ziprasidone (GEODON) 10 mg in sterile water (preservative free) 0.5 mL injection  10 mg IntraMUSCular BID PRN    LORazepam (ATIVAN) injection 1 mg  1 mg Oral Q6H PRN    LORazepam (ATIVAN) tablet 1 mg  1 mg Oral Q6H PRN    hydrOXYzine pamoate (VISTARIL) capsule 25 mg  25 mg Oral TID PRN    magnesium hydroxide (MILK OF MAGNESIA) 400 mg/5 mL oral suspension 30 mL  30 mL Oral DAILY PRN    alum-mag hydroxide-simeth (MYLANTA) oral suspension 30 mL  30 mL Oral Q4H PRN    metFORMIN (GLUCOPHAGE) tablet 500 mg  500 mg Oral BID WITH MEALS    aspirin chewable tablet 81 mg  81 mg Oral DAILY    atorvastatin (LIPITOR) tablet 40 mg  40 mg Oral DAILY    lamoTRIgine (LaMICtal) tablet 200 mg  200 mg Oral BID    venlafaxine-SR (EFFEXOR-XR) capsule 150 mg  150 mg Oral DAILY WITH BREAKFAST    levothyroxine (SYNTHROID) tablet 100 mcg  100 mcg Oral 6am    cyclobenzaprine (FLEXERIL) tablet 10 mg  10 mg Oral BID    lisinopriL (PRINIVIL, ZESTRIL) tablet 5 mg  5 mg Oral DAILY    QUEtiapine (SEROquel) tablet 200 mg  200 mg Oral QHS    acetaminophen (TYLENOL) tablet 650 mg  650 mg Oral Q4H PRN       VITALS:   Patient Vitals for the past 12 hrs:   Temp Pulse Resp BP SpO2   06/16/21 0923 -- 84 -- (!) 114/51 --   06/16/21 0728 97.1 °F (36.2 °C) 84 18 (!) 114/51 93 %       LABS: .  Recent Results (from the past 24 hour(s))   GLUCOSE, POC    Collection Time: 06/15/21  4:52 PM   Result Value Ref Range    Glucose (POC) 124 (H) 65 - 117 mg/dL    Performed by Brittany Romero (RN)    GLUCOSE, POC    Collection Time: 06/16/21  6:27 AM   Result Value Ref Range    Glucose (POC) 140 (H) 65 - 117 mg/dL    Performed by Anai Aguero RN        MSE:   Appearance: casually dressed; fair grooming  Behavior: calm and cooperative; no agitation  Motor: normal  Mood/Affect: slightly dysphoric  Thought Process: coherent; organized  Thought Content: no delusions; no SI/HI  Perceptions: no hallucinations  Insight/Judgment: fair    ASSESSMENT:   1. Bipolar disorder, depressed, severe (F31.4)  2.  Cluster B Personality traits (F60.9)  3. 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 Continue the Effexor XR at 150 mg daily. 2. Continue the Seroquel at 200 mg QHS and Lamictal at 200 mg BID. 3. Continue the PRN Trazodone and Vistaril. 4. ELOS: 2-3 days.

## 2021-06-16 NOTE — BH NOTES
Affect blunted, mood depressed. Pt attended and participated in scheduled group activities. Pt was social and appropriate with staff and peers. Pt interacted well. Pt denies SI/HI/AVH but stated that she had mild pain in her back. Pt ate 100% of snack and was compliant with diabetic protocols and medications. Pt requested a PRN. Pt continues to utilize a walker for assistance with ambulation and staff continues to offer assistance when needed. Pt is in no apparent distress at this time and made no delusional statements. Pt rested in her bed with her eyes closed for 8.25 hours.     PRN Medication Documentation    Specific patient behavior that led to need for PRN medication: back pain  Staff interventions attempted prior to PRN being given: Pt request  PRN medication given: Tylenol 650 mg PO @ 2133  Patient response/effectiveness of PRN medication: effective

## 2021-06-16 NOTE — BH NOTES
Spoke with patient about adult home placement and explained process, she is agreeable to a home in the [facility]. KIKI will complete UAI and start looking for [facility] facilities in that area.     KIKI contacted 11 facilities in [facility] all have no availability, she agreed to extend the search to [address] has left 4 messages with different facilities waiting on call backs

## 2021-06-16 NOTE — GROUP NOTE
TU  GROUP DOCUMENTATION INDIVIDUAL                                                                          Group Therapy Note    Date: 6/16/2021    Group Start Time: 0900  Group End Time: 1145  Group Topic: Process Group - Inpatient    [address] [facility] GROUP DOCUMENTATION GROUP    Group Therapy Note    Focus of session was based on handout of The Not To Do List.  We spoke about how we can get overwhelmed with what we have going on in our lives and then we add to it other peoples responsibilities and problems, thinking about the things that are out of our control, the stuff that drains us, and the stuff that doesn't need to get done at all or, at least, for right now. We spoke about how it is helpful to get the priorities in place for the things that we have to get done. Group members were asked to share their thoughts on their ability to be balanced and are their priorities in place right now. Attendance: Attended    Patient's Goal:     : Develop safety plan for times when triggered in stressful situations (              Interventions/techniques: Informed and Supported    Follows Directions: Followed directions    Interactions: Interacted appropriately    Mental Status: Calm, Congruent and Flat    Behavior/appearance: Cooperative and Needed prompting    Goals Achieved: Able to listen to others and Able to reflect/comment on own behavior      Additional Notes:  Pt was quiet at the beginning of the group and stated she was \"ok\"  She left to go to take her medications and use the bathroom but did not return to group.     Garrett Mendez

## 2021-06-16 NOTE — GROUP NOTE
TU  GROUP DOCUMENTATION INDIVIDUAL                                                                          Group Therapy Note    Date: 6/15/2021    Group Start Time: 2000  Group End Time: 2030  Group Topic: Community Meeting    [address], August Cornea, CNA    [facility] [place] GROUP DOCUMENTATION GROUP    Group Therapy Note    Attendees: 6/8         Attendance: Attended    Patient's Goal:  To get out of here and get her own place. Interventions/techniques: Supported    Follows Directions: Followed directions    Interactions: Interacted appropriately    Mental Status: Depressed    Behavior/appearance: Cooperative    Goals Achieved: Able to engage in interactions      Additional Notes:  She has anxiety at a 9, depression at a 5, and lower back pain at a 9.     Yuliya Obrien CNA

## 2021-06-16 NOTE — ROUTINE PROCESS
Shift change report given to Jason Cardenas Rn, re: SBAR. Medications, and behavior.   Jose Fall Risk Score (3)

## 2021-06-17 LAB
GLUCOSE BLD STRIP.AUTO-MCNC: 116 MG/DL (ref 65–117)
GLUCOSE BLD STRIP.AUTO-MCNC: 142 MG/DL (ref 65–117)
SERVICE CMNT-IMP: ABNORMAL
SERVICE CMNT-IMP: NORMAL

## 2021-06-17 PROCEDURE — 65220000003 HC RM SEMIPRIVATE PSYCH

## 2021-06-17 PROCEDURE — 99231 SBSQ HOSP IP/OBS SF/LOW 25: CPT | Performed by: PSYCHIATRY & NEUROLOGY

## 2021-06-17 PROCEDURE — 82962 GLUCOSE BLOOD TEST: CPT

## 2021-06-17 PROCEDURE — 74011250637 HC RX REV CODE- 250/637: Performed by: PSYCHIATRY & NEUROLOGY

## 2021-06-17 RX ADMIN — CYCLOBENZAPRINE 10 MG: 10 TABLET, FILM COATED ORAL at 17:34

## 2021-06-17 RX ADMIN — HYDROXYZINE PAMOATE 25 MG: 25 CAPSULE ORAL at 17:35

## 2021-06-17 RX ADMIN — LISINOPRIL 5 MG: 5 TABLET ORAL at 08:47

## 2021-06-17 RX ADMIN — LAMOTRIGINE 200 MG: 100 TABLET ORAL at 08:47

## 2021-06-17 RX ADMIN — METFORMIN HYDROCHLORIDE 500 MG: 500 TABLET ORAL at 17:35

## 2021-06-17 RX ADMIN — VENLAFAXINE HYDROCHLORIDE 150 MG: 150 CAPSULE, EXTENDED RELEASE ORAL at 08:46

## 2021-06-17 RX ADMIN — QUETIAPINE FUMARATE 200 MG: 200 TABLET ORAL at 21:11

## 2021-06-17 RX ADMIN — METFORMIN HYDROCHLORIDE 500 MG: 500 TABLET ORAL at 08:46

## 2021-06-17 RX ADMIN — ATORVASTATIN CALCIUM 40 MG: 40 TABLET, FILM COATED ORAL at 08:47

## 2021-06-17 RX ADMIN — LAMOTRIGINE 200 MG: 100 TABLET ORAL at 17:34

## 2021-06-17 RX ADMIN — CYCLOBENZAPRINE 10 MG: 10 TABLET, FILM COATED ORAL at 08:47

## 2021-06-17 RX ADMIN — ASPIRIN 81 MG CHEWABLE TABLET 81 MG: 81 TABLET CHEWABLE at 08:46

## 2021-06-17 RX ADMIN — LEVOTHYROXINE SODIUM 100 MCG: 0.05 TABLET ORAL at 06:14

## 2021-06-17 NOTE — BH NOTES
Spoke with Antonia Andres from [facility] who can accept patient on July 1 due to needing her income on day of admission. UAI was completed and sent to home, SW will apply for [employer] through [employer], which is required. Also, the home will be faxing H&P forms to be completed. KIKI continues to call other facilities and still there are no available beds that accept [employer] or are requesting the same as this facility which is her full amount of her check which she does not have until pay day. She is quiet and appropriate with peers and staff. She stays a lot in bed due to her back pain.

## 2021-06-17 NOTE — BH NOTES
Affect blunted, mood depressed. Pt attended and participated in scheduled group activities. Pt was minimally social and appropriate with staff and peers. Pt interacted well. Pt denies SI/HI/AVH but stated that she had some mild pain in her back. Pt ate 100% of snack. Pt compliant with medications and diabetic protocols. Pt is in no apparent distress at this time and did not request a PRN. Pt rested in her bed with her eyes closed for 6 hours.

## 2021-06-17 NOTE — GROUP NOTE
[facility] GROUP DOCUMENTATION INDIVIDUAL                                                                          Group Therapy Note    Date: 6/17/2021  Marii Mcguire did not come to group this morning.   She stated her \"back feels better on my bed than on those chairs.'    Elvis Ashley

## 2021-06-17 NOTE — GROUP NOTE
TU  GROUP DOCUMENTATION INDIVIDUAL                                                                          Group Therapy Note    Date: 6/16/2021    Group Start Time: 2000  Group End Time: 2030  Group Topic: Community Meeting    [address] [place] [address] GROUP DOCUMENTATION GROUP    Group Therapy Note    Attendees: 5         Attendance: Attended    Patient's Goal:  none    Interventions/techniques: Supported    Follows Directions: Followed directions    Interactions: Interacted appropriately    Mental Status: Anxious, Depressed, Preoccupied and Worried    Behavior/appearance: Cooperative and Withdrawn/quiet    Goals Achieved: Able to engage in interactions, Able to listen to others and Able to give feedback to another      Additional Notes:  Kwadwo Peres was out for the Wrap-Up Group tonight. She stated that she is having a okay day, ate okay, and hopes to sleep good tonight. She stated that she having anxiety at the level of a 9 and depression at the level of a 5. She said that sleeping today was a good thing for her but missing breakfast was a bad thing that happened today. She has no thoughts of hurting herself nor anyone else but is having back pain at the level of a 7. Nurse notified.      Rohith Valero CNA

## 2021-06-17 NOTE — PROGRESS NOTES
INTERVAL Hx:  Records and clinical information were reviewed. States she's feeling \"so-so\" overall, although not as hopeless or despondent as she was PTA. Says she's essentially resigned to having to go into an [facility] or , but she really doesn't want to have to be around a lot of people. However, she recognizes that it's not safe for her to simply live outdoors so she's willing to go wherever there's a bed. No SE's with the current meds and she feels comfortable with this regimen. Slept 6 hours last night. Glucs: still 114-140 the past 24 hours.     MEDS:  Current Facility-Administered Medications   Medication Dose Route Frequency    traZODone (DESYREL) tablet 100 mg  100 mg Oral QHS PRN    ziprasidone (GEODON) 10 mg in sterile water (preservative free) 0.5 mL injection  10 mg IntraMUSCular BID PRN    LORazepam (ATIVAN) injection 1 mg  1 mg Oral Q6H PRN    LORazepam (ATIVAN) tablet 1 mg  1 mg Oral Q6H PRN    hydrOXYzine pamoate (VISTARIL) capsule 25 mg  25 mg Oral TID PRN    magnesium hydroxide (MILK OF MAGNESIA) 400 mg/5 mL oral suspension 30 mL  30 mL Oral DAILY PRN    alum-mag hydroxide-simeth (MYLANTA) oral suspension 30 mL  30 mL Oral Q4H PRN    metFORMIN (GLUCOPHAGE) tablet 500 mg  500 mg Oral BID WITH MEALS    aspirin chewable tablet 81 mg  81 mg Oral DAILY    atorvastatin (LIPITOR) tablet 40 mg  40 mg Oral DAILY    lamoTRIgine (LaMICtal) tablet 200 mg  200 mg Oral BID    venlafaxine-SR (EFFEXOR-XR) capsule 150 mg  150 mg Oral DAILY WITH BREAKFAST    levothyroxine (SYNTHROID) tablet 100 mcg  100 mcg Oral 6am    cyclobenzaprine (FLEXERIL) tablet 10 mg  10 mg Oral BID    lisinopriL (PRINIVIL, ZESTRIL) tablet 5 mg  5 mg Oral DAILY    QUEtiapine (SEROquel) tablet 200 mg  200 mg Oral QHS    acetaminophen (TYLENOL) tablet 650 mg  650 mg Oral Q4H PRN       VITALS:   Patient Vitals for the past 12 hrs:   Temp Pulse Resp BP SpO2   06/17/21 0741 (!) 96.7 °F (35.9 °C) 88 16 (!) 157/57 93 %       LABS: .  Recent Results (from the past 24 hour(s))   GLUCOSE, POC    Collection Time: 06/16/21  4:48 PM   Result Value Ref Range    Glucose (POC) 114 65 - 117 mg/dL    Performed by Vanna Miramontes (PCT)    GLUCOSE, POC    Collection Time: 06/17/21  6:26 AM   Result Value Ref Range    Glucose (POC) 116 65 - 117 mg/dL    Performed by Elizabeth Liz RN        MSE:   Appearance: casually dressed; fair grooming  Behavior: calm and cooperative; no agitation  Motor: normal  Mood/Affect: slightly dysphoric  Thought Process: coherent; organized  Thought Content: no delusions; no SI/HI  Perceptions: no hallucinations  Insight/Judgment: fair    ASSESSMENT:   1. Bipolar disorder, depressed, severe (F31.4)  2.  Cluster B Personality traits (F60.9)  3. 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 Continue the Effexor XR at 150 mg daily. 2. Continue the Seroquel at 200 mg QHS and Lamictal at 200 mg BID. 3. Continue the PRN Trazodone and Vistaril. 4. ELOS: 2-3 days.

## 2021-06-17 NOTE — BH NOTES
Behavioral Health Interdisciplinary Rounds     Patient Name: Therese Whiting  Age: 61 y.o. Room/Bed:  234/01  Primary Diagnosis: Severe depressed bipolar I disorder without psychotic features ([facility] [address]   Admission Status: Voluntary     Readmission within 30 days: no  Power of  in place: no  Patient requires a blocked bed: no          Reason for blocked bed:     VTE Prophylaxis: Not indicated    Mobility needs/Fall risk: yes  Flu Vaccine : no   Nutritional Plan: no  Consults: no         Labs/Testing due today?: no    Sleep hours: 6       Participation in Care/Groups:  yes  Medication Compliant?: Yes  PRNS (last 24 hours): None    Restraints (last 24 hours):  no     CIWA (range last 24 hours):     COWS (range last 24 hours):      Alcohol screening (AUDIT) completed -   AUDIT Score: 0     If applicable, date SBIRT discussed in treatment team AND documented:   AUDIT Screen Score: AUDIT Score: 0      Document Brief Intervention (corresponds directly with the 5 A's, Ask, Advise, Assess, Assist, and Arrange): At- Risk Patients (Score 7-15 for women; 8-15 for men)  Discuss concern patient is drinking at unhealthy levels known to increase risk of alcohol-related health problems. Is Patient ready to commit to change? If No:   Encourage reflection   Discuss short term and long term health risks of consuming alcohol   Barriers to change   Reaffirm willingness to help / Educational materials provided  If Yes:   Set goal  [email or web address] provided    Harmful use or Dependence (Score 16 or greater)   Discuss short term and long term health risks of consuming alcohol   Recommendations   Negotiate drinking goal   Recommend addiction specialist/center   Arrange follow-up appointments.     Tobacco - patient is a smoker: Have You Used Tobacco in the Past 30 Days: No  Illegal Drugs use: Have You Used Any Illegal Substances Over the Past 12 Months: No    24 hour chart check complete: yes Patient goal(s) for today: did not set a goal for herself  Treatment team focus/goals: remain safe in the hospital  Progress note patient is interested in adult home and has now agreed to go anywhere, SW will continue looking for placement    LOS:  4  Expected LOS: 4-5    Financial concerns/prescription coverage:  no  Family contact: no      Family requesting physician contact today:  no  Discharge plan:  Adult home  Access to weapons : no        Outpatient provider(s): yes  Patient's preferred phone number for follow up call : [phone]    Participating treatment team members: Antonina Freeman, * (assigned SW), Melissa Arciniega

## 2021-06-17 NOTE — PROGRESS NOTES
Problem: Falls - Risk of  Goal: *Absence of Falls  Description: Document Rupert Broad Fall Risk and appropriate interventions in the flowsheet. Outcome: Progressing Towards Goal  Note: Fall Risk Interventions:  Mobility Interventions: Utilize walker, cane, or other assistive device         Medication Interventions: Teach patient to arise slowly    Elimination Interventions:  Toilet paper/wipes in reach    History of Falls Interventions: Door open when patient unattended         Problem: Psychosis  Goal: *STG: Remains safe in hospital  Outcome: Progressing Towards Goal  Goal: *STG: Accept constructive criticism without injury or isolation  Outcome: Progressing Towards Goal  Goal: *STG/LTG: Complies with medication therapy  Outcome: Progressing Towards Goal

## 2021-06-17 NOTE — PROGRESS NOTES
Problem: Psychosis  Goal: *STG: Decreased hallucinations  Outcome: Progressing Towards Goal  Goal: *STG: Remains safe in hospital  Outcome: Progressing Towards Goal  Goal: *STG: Participates in individual and group therapy  Outcome: Progressing Towards Goal  Goal: *STG/LTG: Complies with medication therapy  Outcome: Progressing Towards Goal     Problem: Patient Education: Go to Patient Education Activity  Goal: Patient/Family Education  Outcome: Progressing Towards Goal

## 2021-06-17 NOTE — BH NOTES
Shift change report given to Camille Mcmillan re: SBAR, medications, and behavior.   Jose fall risk score: (3)

## 2021-06-17 NOTE — GROUP NOTE
TU  GROUP DOCUMENTATION INDIVIDUAL                                                                          Group Therapy Note    Date: 6/17/2021    Group Start Time: 1030  Group End Time: 1100  Group Topic: Community Meeting    [address] [facility] [facility] GROUP DOCUMENTATION GROUP    Group Therapy Note    Attendees: 7         Attendance: Attended    Patient's Goal:  Pt.'s goal is to leave    Interventions/techniques: Challenged    Follows Directions: Followed directions    Interactions: Interacted appropriately    Mental Status: Calm    Behavior/appearance: Cooperative    Goals Achieved: Able to engage in interactions, Able to listen to others and Able to give feedback to another      Additional Notes:  Pt. Experiencing back pain (rated the pain at a 5); Pt. Noted anxiety level was a (9). No depression. Pt. Had no thoughts of self harm or harm to others.     Isabel Yee

## 2021-06-17 NOTE — BH NOTES
Affect blunt/restricted, mood depressed/anxious. Patient is alert & oriented x 4. Patient speech clear and coherent. Patient answers questions appropriately. Patient denies SI/HI/AVH/pain. No delusional statements made. Patient is complaint with medications and PRN given. Patient appetite good eats 100% of all diabetic meals plus snacks. Patient blood glucose ranges between 116-142. Patient attended and engaged in group with prompting. Patient has unsteady gait and utilizes a walker. Patient in no apparent distress all vital signs within normal limits. PRN Medication Documentation    Specific patient behavior that led to need for PRN medication: Patient requested PRN Vistaril for anxiety. Staff interventions attempted prior to PRN being given: Assessment done. PRN medication given: Vistaril 25 mg po @ 1735. Patient response/effectiveness of PRN medication: Positive effects.

## 2021-06-18 LAB
GLUCOSE BLD STRIP.AUTO-MCNC: 123 MG/DL (ref 65–117)
GLUCOSE BLD STRIP.AUTO-MCNC: 158 MG/DL (ref 65–117)
SERVICE CMNT-IMP: ABNORMAL
SERVICE CMNT-IMP: ABNORMAL

## 2021-06-18 PROCEDURE — 82962 GLUCOSE BLOOD TEST: CPT

## 2021-06-18 PROCEDURE — 74011250637 HC RX REV CODE- 250/637: Performed by: PSYCHIATRY & NEUROLOGY

## 2021-06-18 PROCEDURE — 99231 SBSQ HOSP IP/OBS SF/LOW 25: CPT | Performed by: PSYCHIATRY & NEUROLOGY

## 2021-06-18 PROCEDURE — 65220000003 HC RM SEMIPRIVATE PSYCH

## 2021-06-18 RX ADMIN — METFORMIN HYDROCHLORIDE 500 MG: 500 TABLET ORAL at 17:26

## 2021-06-18 RX ADMIN — LISINOPRIL 5 MG: 5 TABLET ORAL at 08:45

## 2021-06-18 RX ADMIN — METFORMIN HYDROCHLORIDE 500 MG: 500 TABLET ORAL at 08:45

## 2021-06-18 RX ADMIN — CYCLOBENZAPRINE 10 MG: 10 TABLET, FILM COATED ORAL at 17:26

## 2021-06-18 RX ADMIN — VENLAFAXINE HYDROCHLORIDE 150 MG: 150 CAPSULE, EXTENDED RELEASE ORAL at 08:44

## 2021-06-18 RX ADMIN — CYCLOBENZAPRINE 10 MG: 10 TABLET, FILM COATED ORAL at 08:44

## 2021-06-18 RX ADMIN — ATORVASTATIN CALCIUM 40 MG: 40 TABLET, FILM COATED ORAL at 08:44

## 2021-06-18 RX ADMIN — QUETIAPINE FUMARATE 200 MG: 200 TABLET ORAL at 21:09

## 2021-06-18 RX ADMIN — LAMOTRIGINE 200 MG: 100 TABLET ORAL at 08:45

## 2021-06-18 RX ADMIN — ASPIRIN 81 MG CHEWABLE TABLET 81 MG: 81 TABLET CHEWABLE at 08:44

## 2021-06-18 RX ADMIN — LEVOTHYROXINE SODIUM 100 MCG: 0.05 TABLET ORAL at 06:04

## 2021-06-18 RX ADMIN — LAMOTRIGINE 200 MG: 100 TABLET ORAL at 17:26

## 2021-06-18 NOTE — MASTER TREATMENT PLAN
Master Treatment Plan Review for Ben Lockhart    Date of Admission Date Treatment Plan Reviewed Anticipated Date of Discharge Legal Status    6/13/2021 6/17/2021 TBD Voluntary     Treatment & Discharge Planning Changes    Modality or Intervention Changes   N/A   Psychotropic Medication Changes 6/15/2021 Modified Trazodone 100mg PO QHS PRN    Discharge Planning or Target Date Changes ELOS: 2-3 days    New Issues   N/a      Treatment Team Signatures    I have participated in the development of this plan of treatment and agree to its implementation.     Patient Signature       Patient Printed Name Date/Time   /Therapist Signature       //Therapist Printed Name Date/Time   RN Signature       RN Printed Name Date/Time   Unit  Signature       Unit  Printed Name Date/Time   MD Signature       MD Printed Name Date/Time

## 2021-06-18 NOTE — GROUP NOTE
TU  GROUP DOCUMENTATION INDIVIDUAL                                                                          Group Therapy Note    Date: 6/18/2021    Group Start Time: 1030  Group End Time: 5224  Group Topic: Community Meeting    [address] [facility] GROUP DOCUMENTATION GROUP    Group Therapy Note    Attendees:4         Attendance: Attended    Patient's Goal:  To go home    Interventions/techniques: Provide feedback    Follows Directions: Followed directions    Interactions: Interacted appropriately    Mental Status: Depressed    Behavior/appearance: Attentive and Cooperative    Goals Achieved: Able to engage in interactions and Able to listen to others      Additional Notes:  Patient had pain in her back (8) and no suicidal thoughts.     Miriam Granger

## 2021-06-18 NOTE — GROUP NOTE
TU  GROUP DOCUMENTATION INDIVIDUAL                                                                          Group Therapy Note    Date: 6/17/2021    Group Start Time: 2000  Group End Time: 2030  Group Topic: Community Meeting    [address] [place] [address] GROUP DOCUMENTATION GROUP    Group Therapy Note    Attendees: 5         Attendance: Attended    Patient's Goal:  To leave    Interventions/techniques: Supported    Follows Directions: Followed directions    Interactions: Interacted appropriately    Mental Status: Anxious, Calm and Worried    Behavior/appearance: Cooperative    Goals Achieved: Able to engage in interactions, Able to listen to others and Able to give feedback to another      Additional Notes:  Kelsie Levine was out for the Wrap-Up Group tonight. She stated that she was having a good day, ate good, and hopes to sleep good tonight. She is having some anxiety at the level of a 5 but no depression at this time. Good thing today was told she gets to leave soon and nothing bad has happened. She has no thoughts of hurting herself nor anyone else and is having back pain at the level of a 7. Nurse notified.      Ema Stallworth CNA

## 2021-06-18 NOTE — BH NOTES
Shift change report given to Chris Hall re: SBAR, medications, and behavior.   Jose fall risk score 3

## 2021-06-18 NOTE — PROGRESS NOTES
INTERVAL Hx:  Records and clinical information were reviewed. States she's feeling \"tired and sleepy\", but less depressed than PTA. Not as hopeless or despondent although she still feels dysphoric and anxious about the uncertainty of her disposition plan. Seems to have unrealistic expectations of what might be available for her and her fear of being in larger groups of people make her reluctant to commit to some options. No SE's with the current meds and she feels comfortable with this regimen. Slept 7 hours last night. Glucs: 116-142 the past 24 hours.     MEDS:  Current Facility-Administered Medications   Medication Dose Route Frequency    traZODone (DESYREL) tablet 100 mg  100 mg Oral QHS PRN    ziprasidone (GEODON) 10 mg in sterile water (preservative free) 0.5 mL injection  10 mg IntraMUSCular BID PRN    LORazepam (ATIVAN) injection 1 mg  1 mg Oral Q6H PRN    LORazepam (ATIVAN) tablet 1 mg  1 mg Oral Q6H PRN    hydrOXYzine pamoate (VISTARIL) capsule 25 mg  25 mg Oral TID PRN    magnesium hydroxide (MILK OF MAGNESIA) 400 mg/5 mL oral suspension 30 mL  30 mL Oral DAILY PRN    alum-mag hydroxide-simeth (MYLANTA) oral suspension 30 mL  30 mL Oral Q4H PRN    metFORMIN (GLUCOPHAGE) tablet 500 mg  500 mg Oral BID WITH MEALS    aspirin chewable tablet 81 mg  81 mg Oral DAILY    atorvastatin (LIPITOR) tablet 40 mg  40 mg Oral DAILY    lamoTRIgine (LaMICtal) tablet 200 mg  200 mg Oral BID    venlafaxine-SR (EFFEXOR-XR) capsule 150 mg  150 mg Oral DAILY WITH BREAKFAST    levothyroxine (SYNTHROID) tablet 100 mcg  100 mcg Oral 6am    cyclobenzaprine (FLEXERIL) tablet 10 mg  10 mg Oral BID    lisinopriL (PRINIVIL, ZESTRIL) tablet 5 mg  5 mg Oral DAILY    QUEtiapine (SEROquel) tablet 200 mg  200 mg Oral QHS    acetaminophen (TYLENOL) tablet 650 mg  650 mg Oral Q4H PRN       VITALS:   Patient Vitals for the past 12 hrs:   Temp Pulse Resp BP SpO2   06/18/21 0812 97.2 °F (36.2 °C) 92 16 (!) 101/50 95 % LABS: .  Recent Results (from the past 24 hour(s))   GLUCOSE, POC    Collection Time: 06/17/21  4:44 PM   Result Value Ref Range    Glucose (POC) 142 (H) 65 - 117 mg/dL    Performed by Kentrell Arcos (RN)    GLUCOSE, POC    Collection Time: 06/18/21  6:09 AM   Result Value Ref Range    Glucose (POC) 123 (H) 65 - 117 mg/dL    Performed by Dennis Rascon (RN)        MSE:   Appearance: casually dressed; fair grooming  Behavior: calm and cooperative; no agitation  Motor: normal  Mood/Affect: slightly dysphoric  Thought Process: coherent; organized  Thought Content: no delusions; no SI/HI  Perceptions: no hallucinations  Insight/Judgment: fair    ASSESSMENT:   1. Bipolar disorder, depressed, severe (F31.4)  2.  Cluster B Personality traits (F60.9)  3. 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 Continue the Effexor XR at 150 mg daily. 2. Continue the Seroquel at 200 mg QHS and Lamictal at 200 mg BID. 3. Continue the PRN Trazodone and Vistaril. 4. ELOS: 3-4 days.

## 2021-06-18 NOTE — PROGRESS NOTES
Problem: Falls - Risk of  Goal: *Absence of Falls  Description: Document Ricky Stanton Fall Risk and appropriate interventions in the flowsheet. Outcome: Progressing Towards Goal  Note: Fall Risk Interventions:  Mobility Interventions: Utilize walker, cane, or other assistive device         Medication Interventions: Teach patient to arise slowly    Elimination Interventions: Toilet paper/wipes in reach    History of Falls Interventions: Room close to nurse's station         Problem: Patient Education: Go to Patient Education Activity  Goal: Patient/Family Education  Outcome: Progressing Towards Goal     Problem: Psychosis  Goal: *STG: Remains safe in hospital  Outcome: Progressing Towards Goal  Goal: *STG: Accept constructive criticism without injury or isolation  Outcome: Resolved/Met  Goal: *STG: Participates in individual and group therapy  Outcome: Resolved/Met  Goal: *STG/LTG: Complies with medication therapy  Outcome: Resolved/Met  Goal: *STG/LTG: Demonstrates improved thought patterns as evidenced by logical and coherent speech  Outcome: Resolved/Met     Problem: Patient Education: Go to Patient Education Activity  Goal: Patient/Family Education  Outcome: Progressing Towards Goal     Problem: Risk of Harm to Self or Others  Goal: Patient/Family Education  Outcome: Progressing Towards Goal  Goal: *LTG: Denial of suicidal ideation or intent for at least 2 days  Outcome: Resolved/Met  Goal: STG: Patient will limit number of statements of suicidal ideation or intent per day  Description: Patient will limit number of statements of suicidal ideation or intent per day. Indicate number of statements/day. Outcome: Resolved/Met  Goal: *STG: Patient will express feelings of depression, suicide, or self-harm without acting out  Description: Patient will express feelings of depression, suicide, or self-harm without acting out. Comment on how this will be achieved.   Outcome: Resolved/Met     Problem: Depressed Mood (Adult/Pediatric)  Goal: *STG: Remains safe in hospital  Outcome: Resolved/Met  Goal: *STG: Complies with medication therapy  Outcome: Resolved/Met

## 2021-06-18 NOTE — GROUP NOTE
TU  GROUP DOCUMENTATION INDIVIDUAL                                                                          Group Therapy Note    Date: 6/18/2021    Pt did not attend group today.   We will continue to assess her ability to attend future group

## 2021-06-18 NOTE — BH NOTES
Affect blunted. Mood anxious/depressed. Alert and oriented x 3. Isolative to room to sleep in between meals. Refused group activity. Ibrahima Vázquez are working on getting me into a nursing home when I am discharged. \"  She is \"ok with it. \"  Med compliant. Blood sugars have been under control but was 158 at 1648. Ate all of meals. Denies SI/HI/AVH/pain. Confused at times.

## 2021-06-18 NOTE — PROGRESS NOTES
Problem: Psychosis  Goal: *STG: Remains safe in hospital  Outcome: Progressing Towards Goal  Note: No harm has come to patient. Affect blunted. Mood anxious/depressed. Alert and oriented x 3. Isolative to room to sleep in between meals. Refused group activity. Nancy Parish are working on getting me into a nursing home when I am discharged. \"  She is \"ok with it. \"  Med compliant. Blood sugars have been under control. Ate all of meals. Denies SI/HI/AVH/pain.

## 2021-06-18 NOTE — BH NOTES
Affect constricted, mood depressed/anxious. Alert and oriented x 4. Calm and cooperative. Appropriately dressed, makes eye contact. Interacts with staff and peers. Attended and participated in group. Ate 100% of snack. Medication compliant. Denies SI/HI/AVH. No delusional statements made. Patient stable with no s/s of distress.   Patient laid in bed with eyes closed for 7 hours     BS at 0679- 123

## 2021-06-19 LAB
GLUCOSE BLD STRIP.AUTO-MCNC: 118 MG/DL (ref 65–117)
GLUCOSE BLD STRIP.AUTO-MCNC: 138 MG/DL (ref 65–117)
SERVICE CMNT-IMP: ABNORMAL
SERVICE CMNT-IMP: ABNORMAL

## 2021-06-19 PROCEDURE — 74011250637 HC RX REV CODE- 250/637: Performed by: PSYCHIATRY & NEUROLOGY

## 2021-06-19 PROCEDURE — 99231 SBSQ HOSP IP/OBS SF/LOW 25: CPT | Performed by: PSYCHIATRY & NEUROLOGY

## 2021-06-19 PROCEDURE — 82962 GLUCOSE BLOOD TEST: CPT

## 2021-06-19 PROCEDURE — 65220000003 HC RM SEMIPRIVATE PSYCH

## 2021-06-19 RX ADMIN — LISINOPRIL 5 MG: 5 TABLET ORAL at 08:36

## 2021-06-19 RX ADMIN — CYCLOBENZAPRINE 10 MG: 10 TABLET, FILM COATED ORAL at 17:21

## 2021-06-19 RX ADMIN — ACETAMINOPHEN 650 MG: 325 TABLET ORAL at 20:06

## 2021-06-19 RX ADMIN — METFORMIN HYDROCHLORIDE 500 MG: 500 TABLET ORAL at 17:21

## 2021-06-19 RX ADMIN — QUETIAPINE FUMARATE 200 MG: 200 TABLET ORAL at 21:26

## 2021-06-19 RX ADMIN — ATORVASTATIN CALCIUM 40 MG: 40 TABLET, FILM COATED ORAL at 08:36

## 2021-06-19 RX ADMIN — METFORMIN HYDROCHLORIDE 500 MG: 500 TABLET ORAL at 08:36

## 2021-06-19 RX ADMIN — LAMOTRIGINE 200 MG: 100 TABLET ORAL at 17:21

## 2021-06-19 RX ADMIN — ASPIRIN 81 MG CHEWABLE TABLET 81 MG: 81 TABLET CHEWABLE at 08:36

## 2021-06-19 RX ADMIN — LAMOTRIGINE 200 MG: 100 TABLET ORAL at 08:36

## 2021-06-19 RX ADMIN — LEVOTHYROXINE SODIUM 100 MCG: 0.05 TABLET ORAL at 06:25

## 2021-06-19 RX ADMIN — VENLAFAXINE HYDROCHLORIDE 150 MG: 150 CAPSULE, EXTENDED RELEASE ORAL at 08:36

## 2021-06-19 RX ADMIN — CYCLOBENZAPRINE 10 MG: 10 TABLET, FILM COATED ORAL at 08:36

## 2021-06-19 NOTE — GROUP NOTE
TU  GROUP DOCUMENTATION INDIVIDUAL                                                                          Group Therapy Note    Date: 6/19/2021    Group Start Time: 1030  Group End Time: 1100  Group Topic: Community Meeting    [address] [facility] [address] [facility] GROUP DOCUMENTATION GROUP    Group Therapy Note    Attendees: 4         Attendance: Attended    Patient's Goal: To be discharge home    Interventions/techniques: Provide feedback    Follows Directions: Followed directions    Interactions: Interacted appropriately    Mental Status: Calm    Behavior/appearance: Cooperative    Goals Achieved: Able to listen to others      Additional Notes:  Patient  Appetite was good, physical pain -  back level 9, no suicidal thoughts. Patient has no depression or anxiety.     Andrés Jimenez

## 2021-06-19 NOTE — BH NOTES
Shift change report given to Falguni Zaragoza RN re: SBAR, medications, and behavior.   Jose fall risk score 3

## 2021-06-19 NOTE — BH NOTES
Cooperative and med compliant. Attended group activities as active participant. Denies SI/HI/AVH/pain. Uses walker to ambulate. Worked on [employer] and art therapy today. Denies SE's of meds. Received no phone calls.

## 2021-06-19 NOTE — PROGRESS NOTES
Problem: Depressed Mood (Adult/Pediatric)  Goal: *STG: Participates in treatment plan  Outcome: Progressing Towards Goal  Note: Cooperative and med compliant. Attended group activity as active participant. Denies SI/HI/AVH/pain. Uses walker to ambulate. Worked on word searches today.

## 2021-06-19 NOTE — BH NOTES
Affect constricted, mood depressed/anxious. Alert and oriented x 4. Calm and cooperative. Medication compliant. Denies SI/HI/AVH. No delusional statements made. Patient stable with no s/s of distress.   Patient laid in bed with eyes closed for 5 hours     BS at 0618 --118

## 2021-06-19 NOTE — PROGRESS NOTES
Subjective:  SLEPT 5 HOURS. \"IM DOING BETTER. \"    NO SI/HI/AVH. NO SIDE EFFECTS OF MEDICATIONS NOTED. Objective:   Vitals: VITALS ARE STABLE AND WITHIN NORMAL LIMITS  Labs: FSBS 118-158    Mental Status Exam:  Appearance: FAIRLY GROOMED,   Behavior: COOPERATIVE, POLITE  Motor: NO PSYCHOMOTOR AGITATION OR RETARDATION NOTED  Speech: NORMAL RATE, VOLUME AND PROSODY  Mood: OK. IM FEELING BETTER  Affect:   STABLE  Thought Content: NO SI/HI/AVH,   Thought Process: LINEAR AND GOAL ORIENTED  Orientation: A&O X 4  Insight/Judgment: FAIR X 2    Assessment:   1. Bipolar disorder, depressed, severe (F31.4)  2.  Cluster B Personality traits (F60.9)  3. Cocaine and Alcohol Use disorders, in remission (F14.21 and F10.21)    Plan:   CONT CURRENT TREATMENT PLAN      REASON FOR CONTINUED STAY: DC PLANNING    1. I certify that the patient needs 24 hours of medical supervision to improve the above conditions. 2. The current mental illness is classified as severe. 3. Outpatient services only are insufficient currently to treat this patient's current psychiatric condition. 4. There is continued need for psychiatric inpatient treatment to address the above condition. 5. I certify that current psychiatric treatment could be reasonably expected to improve the patient's condition. 6. I certify that the patient should expect to make improvements as a result of inpatient psychiatric services  7. The risks and benefits of treatment were discussed with the patient.

## 2021-06-20 LAB
GLUCOSE BLD STRIP.AUTO-MCNC: 116 MG/DL (ref 65–117)
GLUCOSE BLD STRIP.AUTO-MCNC: 134 MG/DL (ref 65–117)
SERVICE CMNT-IMP: ABNORMAL
SERVICE CMNT-IMP: NORMAL

## 2021-06-20 PROCEDURE — 74011000250 HC RX REV CODE- 250: Performed by: PSYCHIATRY & NEUROLOGY

## 2021-06-20 PROCEDURE — 82962 GLUCOSE BLOOD TEST: CPT

## 2021-06-20 PROCEDURE — 99231 SBSQ HOSP IP/OBS SF/LOW 25: CPT | Performed by: PSYCHIATRY & NEUROLOGY

## 2021-06-20 PROCEDURE — 65220000003 HC RM SEMIPRIVATE PSYCH

## 2021-06-20 PROCEDURE — 74011250637 HC RX REV CODE- 250/637: Performed by: PSYCHIATRY & NEUROLOGY

## 2021-06-20 RX ORDER — LIDOCAINE 4 G/100G
1 PATCH TOPICAL EVERY 24 HOURS
Status: DISCONTINUED | OUTPATIENT
Start: 2021-06-20 | End: 2021-06-23

## 2021-06-20 RX ADMIN — ASPIRIN 81 MG CHEWABLE TABLET 81 MG: 81 TABLET CHEWABLE at 08:07

## 2021-06-20 RX ADMIN — ACETAMINOPHEN 650 MG: 325 TABLET ORAL at 09:22

## 2021-06-20 RX ADMIN — ACETAMINOPHEN 650 MG: 325 TABLET ORAL at 21:58

## 2021-06-20 RX ADMIN — LAMOTRIGINE 200 MG: 100 TABLET ORAL at 17:23

## 2021-06-20 RX ADMIN — CYCLOBENZAPRINE 10 MG: 10 TABLET, FILM COATED ORAL at 17:23

## 2021-06-20 RX ADMIN — LISINOPRIL 5 MG: 5 TABLET ORAL at 08:08

## 2021-06-20 RX ADMIN — LAMOTRIGINE 200 MG: 100 TABLET ORAL at 08:07

## 2021-06-20 RX ADMIN — ATORVASTATIN CALCIUM 40 MG: 40 TABLET, FILM COATED ORAL at 08:08

## 2021-06-20 RX ADMIN — LEVOTHYROXINE SODIUM 100 MCG: 0.05 TABLET ORAL at 06:56

## 2021-06-20 RX ADMIN — METFORMIN HYDROCHLORIDE 500 MG: 500 TABLET ORAL at 08:07

## 2021-06-20 RX ADMIN — QUETIAPINE FUMARATE 200 MG: 200 TABLET ORAL at 21:58

## 2021-06-20 RX ADMIN — VENLAFAXINE HYDROCHLORIDE 150 MG: 150 CAPSULE, EXTENDED RELEASE ORAL at 08:07

## 2021-06-20 RX ADMIN — METFORMIN HYDROCHLORIDE 500 MG: 500 TABLET ORAL at 17:23

## 2021-06-20 RX ADMIN — CYCLOBENZAPRINE 10 MG: 10 TABLET, FILM COATED ORAL at 08:07

## 2021-06-20 NOTE — GROUP NOTE
TU  GROUP DOCUMENTATION INDIVIDUAL                                                                          Group Therapy Note    Date: 6/20/2021    Group Start Time: 1030  Group End Time: 1100  Group Topic: Community Meeting    [address] [facility] [address] 1150 [facility] GROUP DOCUMENTATION GROUP    Group Therapy Note    Attendees: 4         Attendance: Attended    Patient's Goal:  To be positive today    Interventions/techniques: Provide feedback    Follows Directions: Followed directions    Interactions: Interacted appropriately    Mental Status: Calm    Behavior/appearance: Cooperative    Goals Achieved: Able to listen to others      Additional Notes:  Patient appetite was good, no physical pain at the moment, no suicidal thoughts. Patient has no depression or anxiety.     [facility]

## 2021-06-20 NOTE — PROGRESS NOTES
Problem: Depressed Mood (Adult/Pediatric)  Goal: *STG: Participates in treatment plan  Outcome: Progressing Towards Goal  Note: Active participant in group activity. Alert and oriented x 3. \"I have some memory problems. \"  Ate all of meals. Denies SI/HI/AVH. Tylenol 650mg po given at 0922 for back pain of 5 with and reduction in pain to 1/10.

## 2021-06-20 NOTE — PROGRESS NOTES
Subjective:  SLEPT 7.15 HOURS.      \"IM DOING BETTER. IM IN A LOT OF PAIN THOUGH. I AM ALWAYS IN PAIN. \"     NO SI/HI/AVH. NO SIDE EFFECTS OF MEDICATIONS NOTED.      Objective:   Vitals: VITALS ARE STABLE AND WITHIN NORMAL LIMITS  Labs: FSBS 118-158     Mental Status Exam:  Appearance: FAIRLY GROOMED,   Behavior: COOPERATIVE, POLITE  Motor: NO PSYCHOMOTOR AGITATION OR RETARDATION NOTED  Speech: NORMAL RATE, VOLUME AND PROSODY  Mood: OK. IM FEELING BETTER  Affect:   STABLE  Thought Content: NO SI/HI/AVH,   Thought Process: LINEAR AND GOAL ORIENTED  Orientation: A&O X 4  Insight/Judgment: FAIR X 2     Assessment:   1.  Bipolar disorder, depressed, severe (F31.4)  2.  Cluster B Personality traits (F60.9)  3.  Cocaine and Alcohol Use disorders, in remission (F14.21 and F10.21)     Plan:   CONT CURRENT TREATMENT PLAN  ADD LIDODERM PATCH TO SACRAL AREA FOR PAIN        REASON FOR CONTINUED STAY: DC PLANNING     1. I certify that the patient needs 24 hours of medical supervision to improve the above conditions. 2. The current mental illness is classified as severe. 3. Outpatient services only are insufficient currently to treat this patient's current psychiatric condition. 4. There is continued need for psychiatric inpatient treatment to address the above condition. 5. I certify that current psychiatric treatment could be reasonably expected to improve the patient's condition. 6. I certify that the patient should expect to make improvements as a result of inpatient psychiatric services  7. The risks and benefits of treatment were discussed with the patient.

## 2021-06-20 NOTE — BH NOTES
Active participant in group activity. Alert and oriented x 3. \"I have some memory problems. \"  Ate all of meals. Denies SI/HI/AVH. Tylenol 650mg po given at 0922 for back pain of 5 with and reduction in pain to 1/10. FSBS at 1650-116. Able to focus on word puzzles and talking with others today.

## 2021-06-20 NOTE — PROGRESS NOTES
Problem: Falls - Risk of  Goal: *Absence of Falls  Description: Document Tracey Dejesus Fall Risk and appropriate interventions in the flowsheet.   Outcome: Progressing Towards Goal  Note: Fall Risk Interventions:  Mobility Interventions: Utilize walker, cane, or other assistive device         Medication Interventions: Teach patient to arise slowly    Elimination Interventions: Stay With Me (per policy)    History of Falls Interventions: Room close to nurse's station         Problem: Patient Education: Go to Patient Education Activity  Goal: Patient/Family Education  Outcome: Progressing Towards Goal     Problem: Psychosis  Goal: *STG: Remains safe in hospital  Outcome: Resolved/Met     Problem: *Psychosis: Discharge Outcome  Goal: *Absent or Controlled Active Psychotic Symptoms  Outcome: Resolved/Met     Problem: Patient Education: Go to Patient Education Activity  Goal: Patient/Family Education  Outcome: Resolved/Met     Problem: Risk of Harm to Self or Others  Goal: Patient/Family Education  Outcome: Resolved/Met  Goal: *STG: Patient will identify 2 feelings or symptoms that might indicate a crisis is beginning to develop  Outcome: Resolved/Met  Goal: *STG: Patient will identify 2 alternative ways to cope with suicidal or self-harm feelings  Outcome: Resolved/Met  Goal: *STG: Patient will identify 2 support persons to contact after discharge  Outcome: Resolved/Met

## 2021-06-20 NOTE — BH NOTES
Affect constricted, mood depressed/anxious. Alert and oriented x 4. Calm and cooperative.  Medication compliant. Denies SI/HI/AVH. No delusional statements made.  Patient stable with no s/s of distress.  Patient laid in bed with eyes closed for 7 hours    PRN Medication Documentation    Specific patient behavior that led to need for PRN medication: c/o of 5/10 lower back pain  Staff interventions attempted prior to PRN being given: repositioned   PRN medication given: tylenol 650 mg po at 2002  Patient response/effectiveness of PRN medication: pain 0/10

## 2021-06-21 LAB
GLUCOSE BLD STRIP.AUTO-MCNC: 125 MG/DL (ref 65–117)
GLUCOSE BLD STRIP.AUTO-MCNC: 129 MG/DL (ref 65–117)
SERVICE CMNT-IMP: ABNORMAL
SERVICE CMNT-IMP: ABNORMAL

## 2021-06-21 PROCEDURE — 99232 SBSQ HOSP IP/OBS MODERATE 35: CPT | Performed by: PSYCHIATRY & NEUROLOGY

## 2021-06-21 PROCEDURE — 74011250637 HC RX REV CODE- 250/637: Performed by: PSYCHIATRY & NEUROLOGY

## 2021-06-21 PROCEDURE — 65220000003 HC RM SEMIPRIVATE PSYCH

## 2021-06-21 PROCEDURE — 74011000250 HC RX REV CODE- 250: Performed by: PSYCHIATRY & NEUROLOGY

## 2021-06-21 PROCEDURE — 82962 GLUCOSE BLOOD TEST: CPT

## 2021-06-21 RX ADMIN — QUETIAPINE FUMARATE 200 MG: 200 TABLET ORAL at 21:19

## 2021-06-21 RX ADMIN — CYCLOBENZAPRINE 10 MG: 10 TABLET, FILM COATED ORAL at 17:44

## 2021-06-21 RX ADMIN — CYCLOBENZAPRINE 10 MG: 10 TABLET, FILM COATED ORAL at 09:03

## 2021-06-21 RX ADMIN — VENLAFAXINE HYDROCHLORIDE 150 MG: 150 CAPSULE, EXTENDED RELEASE ORAL at 09:02

## 2021-06-21 RX ADMIN — ASPIRIN 81 MG CHEWABLE TABLET 81 MG: 81 TABLET CHEWABLE at 09:02

## 2021-06-21 RX ADMIN — LEVOTHYROXINE SODIUM 100 MCG: 0.05 TABLET ORAL at 06:26

## 2021-06-21 RX ADMIN — LAMOTRIGINE 200 MG: 100 TABLET ORAL at 09:03

## 2021-06-21 RX ADMIN — METFORMIN HYDROCHLORIDE 500 MG: 500 TABLET ORAL at 17:44

## 2021-06-21 RX ADMIN — TRAZODONE HYDROCHLORIDE 100 MG: 100 TABLET ORAL at 21:19

## 2021-06-21 RX ADMIN — LISINOPRIL 5 MG: 5 TABLET ORAL at 09:03

## 2021-06-21 RX ADMIN — LAMOTRIGINE 200 MG: 100 TABLET ORAL at 17:44

## 2021-06-21 RX ADMIN — ATORVASTATIN CALCIUM 40 MG: 40 TABLET, FILM COATED ORAL at 09:03

## 2021-06-21 RX ADMIN — METFORMIN HYDROCHLORIDE 500 MG: 500 TABLET ORAL at 09:03

## 2021-06-21 NOTE — PROGRESS NOTES
Problem: Falls - Risk of  Goal: *Absence of Falls  Description: Document Elvie Kiser Fall Risk and appropriate interventions in the flowsheet. 6/21/2021 0441 by Misha Carnes  Outcome: Progressing Towards Goal  Note: Fall Risk Interventions:  Mobility Interventions: Utilize walker, cane, or other assistive device         Medication Interventions: Teach patient to arise slowly    Elimination Interventions: Toilet paper/wipes in reach    History of Falls Interventions: Room close to nurse's station      6/21/2021 0223 by Misha Carnes  Outcome: Progressing Towards Goal  Note: Fall Risk Interventions:  Mobility Interventions: Utilize walker, cane, or other assistive device         Medication Interventions: Teach patient to arise slowly    Elimination Interventions: Toilet paper/wipes in reach    History of Falls Interventions: Door open when patient unattended         Problem: Patient Education: Go to Patient Education Activity  Goal: Patient/Family Education  Outcome: Progressing Towards Goal     Problem: Psychosis  Goal: *STG: Decreased delusional thinking  Outcome: Progressing Towards Goal     Problem: Risk of Harm to Self or Others  Goal: *STG: Patient will develop a list of support people in his life  Description: Patient will develop a list of support people in his/her life whom he/she will call when feeling to hurt self or others may return.   Outcome: Progressing Towards Goal  Goal: *STG: Patient will agree to attend scheduled follow-up therapy and support programs after discharge  Outcome: Progressing Towards Goal     Problem: Depressed Mood (Adult/Pediatric)  Goal: *STG: Participates in treatment plan  Outcome: Progressing Towards Goal  Goal: *STG: Demonstrates reduction in symptoms and increase in insight into coping skills/future focused  Outcome: Resolved/Met

## 2021-06-21 NOTE — PROGRESS NOTES
Problem: Falls - Risk of  Goal: *Absence of Falls  Description: Document Melanie Lawrence Fall Risk and appropriate interventions in the flowsheet. Outcome: Progressing Towards Goal  Note: Fall Risk Interventions:  Mobility Interventions: Utilize walker, cane, or other assistive device         Medication Interventions: Teach patient to arise slowly    Elimination Interventions:  Toilet paper/wipes in reach    History of Falls Interventions: Door open when patient unattended         Problem: Patient Education: Go to Patient Education Activity  Goal: Patient/Family Education  Outcome: Progressing Towards Goal     Problem: Psychosis  Goal: *STG: Decreased delusional thinking  Outcome: Progressing Towards Goal     Problem: Depressed Mood (Adult/Pediatric)  Goal: *STG: Participates in treatment plan  Outcome: Progressing Towards Goal  Goal: *STG: Demonstrates reduction in symptoms and increase in insight into coping skills/future focused  Outcome: Resolved/Met

## 2021-06-21 NOTE — GROUP NOTE
TU  GROUP DOCUMENTATION INDIVIDUAL                                                                          Group Therapy Note    Date: 6/21/2021    Group Start Time: 1000  Group End Time: 0091  Group Topic: Community Meeting    [address] [facility] [address] [facility] GROUP DOCUMENTATION GROUP    Group Therapy Note    Attendees: 5         Attendance: Attended    Patient's Goal:  Pt.'s goal is to find placement     Interventions/techniques: Informed    Follows Directions: Followed directions    Interactions: Interacted appropriately    Mental Status: Calm and Flat    Behavior/appearance: Cooperative    Goals Achieved: Able to engage in interactions, Able to listen to others and Able to give feedback to another      Additional Notes:  Pt. States she experiences pain all the time all over. She states her anxiety level is a (2) stating no depression. Pt.  Has no thoughts of self harm or harm to others.      Chiquis Sherwood

## 2021-06-21 NOTE — BH NOTES
Affect blunted/constricted, mood depressed/anxious. Patient is alert & oriented x 4. Patient denies SI/HI/AVH. No delusional statements made. Patient is compliant with medications. Patient appetite good eats 100% of all diabetic meals plus snacks. Patient blood glucose 129 @ 1631. Patient attended & engaged in groups with prompting. Patient has unsteady gait and utilizes a walker. Patient wearing a face mask and reminded to practice social distancing and good hand washing to ensure the safety of others. Patient verbalized understanding. Patient in no apparent distress all vital signs within normal limits.

## 2021-06-21 NOTE — GROUP NOTE
IP  GROUP DOCUMENTATION INDIVIDUAL Group Therapy Note Date: 6/20/2021 Group Start Time: 2000 Group End Time: 2030 Group Topic: [employer] Kamran Lara [facility] GROUP DOCUMENTATION GROUP Group Therapy Note Attendees: 5 Attendance: {[facility] GROUP DOC ATTENDANCE:89299} Patient's Goal:  *** Interventions/techniques: {[facility] GROUP DOC INTERVENTIONS/TECHNIQUES:51194} Follows Directions: {[facility] GROUP DOC FOLLOWS DIRECTION:38582} Interactions: {[facility] GROUP DOC INTERACTIONS:21275} Mental Status: {[facility] GROUP [facility]25581} Behavior/appearance: {[facility] GROUP DOC BEHAVIOR/APPEARANCE:69806} Goals Achieved: {[facility] GROUP DOC GOALS ACHIEVED:72749} Additional Notes:  *** Bonnie Saeed

## 2021-06-21 NOTE — GROUP NOTE
[facility] GROUP DOCUMENTATION INDIVIDUAL                                                                          Group Therapy Note    Date: 6/20/2021    Group Start Time: 2000  Group End Time: 2030  Group Topic: Community Meeting    [address] [facility] GROUP DOCUMENTATION GROUP    Group Therapy Note    Attendees: 5         Attendance: Attended    Patient's Goal:  To keep improving to go home    Interventions/techniques: Provide feedback    Follows Directions: Followed directions    Interactions: Interacted appropriately    Mental Status: Calm    Behavior/appearance: Cooperative    Goals Achieved: Able to engage in interactions      Additional Notes:  Clemente Huber ate good today she believes that she will sleep good tonight, nothing good or bad happened to her today. Her anxiety was at 2 on the 0-10 scale, she did have some depression but she didn't rate it. No thoughts of hurting herself or anyone else. She did have a pain level of 8 on the 0-10 scale, the Nurse was notified of her pain level.     Catalina Barton

## 2021-06-21 NOTE — PROGRESS NOTES
Problem: Falls - Risk of  Goal: *Absence of Falls  Description: Document Tan Arenas Fall Risk and appropriate interventions in the flowsheet. Outcome: Progressing Towards Goal  Note: Fall Risk Interventions:  Mobility Interventions: Utilize walker, cane, or other assistive device         Medication Interventions: Teach patient to arise slowly    Elimination Interventions:  Toilet paper/wipes in reach    History of Falls Interventions: Door open when patient unattended         Problem: Patient Education: Go to Patient Education Activity  Goal: Patient/Family Education  Outcome: Progressing Towards Goal

## 2021-06-21 NOTE — BH NOTES
Shift change report given to Juan Dean re: SBAR, medications, and behavior.   Jose fall risk score: (3)

## 2021-06-21 NOTE — BH NOTES
Affect constricted, mood depressed/anxious. Alert and Oriented X 4. Cooperative and pleasant. Attended and participated in group. Interacted with staff and peers. Makes needs known. Ate all meals and snacks. Denied SI/HI/AVH and pain. Medication compliant. Stable with no s/s of distress. PRN Medication Documentation    Specific patient behavior that led to need for PRN medication: c/o back pain 5/10  Staff interventions attempted prior to PRN being given.  repostioned  PRN medication given: tylenol 650 mg po at 2158  Patient response/effectiveness of PRN medication: pain 0/10

## 2021-06-21 NOTE — BH NOTES
Shift change report given to Gucci Avina RN re: SBAR, medications, and behavior.   Jose fall risk score 3

## 2021-06-21 NOTE — PROGRESS NOTES
INTERVAL Hx:  Records and clinical information from the weekend were reviewed. States she's mostly feeling \"a little better\", and that her mood is not nearly as dysphoric or hopeless as it was PTA. She feels resigned to going to a [facility], and she understands that it may be much better for her once she adjusts to the change. Tolerating the meds easily--no SE's at all. Discussed likely D/C plans and options. May have to go to a shelter briefly before transitioning to a [address] [place] [address] Avenue. Slept \"well\" last night. Glucs: 116-158 the past 72 hours.     MEDS:  Current Facility-Administered Medications   Medication Dose Route Frequency    lidocaine 4 % patch 1 Patch  1 Patch TransDERmal Q24H    traZODone (DESYREL) tablet 100 mg  100 mg Oral QHS PRN    ziprasidone (GEODON) 10 mg in sterile water (preservative free) 0.5 mL injection  10 mg IntraMUSCular BID PRN    LORazepam (ATIVAN) injection 1 mg  1 mg Oral Q6H PRN    LORazepam (ATIVAN) tablet 1 mg  1 mg Oral Q6H PRN    hydrOXYzine pamoate (VISTARIL) capsule 25 mg  25 mg Oral TID PRN    magnesium hydroxide (MILK OF MAGNESIA) 400 mg/5 mL oral suspension 30 mL  30 mL Oral DAILY PRN    alum-mag hydroxide-simeth (MYLANTA) oral suspension 30 mL  30 mL Oral Q4H PRN    metFORMIN (GLUCOPHAGE) tablet 500 mg  500 mg Oral BID WITH MEALS    aspirin chewable tablet 81 mg  81 mg Oral DAILY    atorvastatin (LIPITOR) tablet 40 mg  40 mg Oral DAILY    lamoTRIgine (LaMICtal) tablet 200 mg  200 mg Oral BID    venlafaxine-SR (EFFEXOR-XR) capsule 150 mg  150 mg Oral DAILY WITH BREAKFAST    levothyroxine (SYNTHROID) tablet 100 mcg  100 mcg Oral 6am    cyclobenzaprine (FLEXERIL) tablet 10 mg  10 mg Oral BID    lisinopriL (PRINIVIL, ZESTRIL) tablet 5 mg  5 mg Oral DAILY    QUEtiapine (SEROquel) tablet 200 mg  200 mg Oral QHS    acetaminophen (TYLENOL) tablet 650 mg  650 mg Oral Q4H PRN       VITALS:   Patient Vitals for the past 12 hrs:   Temp Pulse Resp BP SpO2   06/21/21 0748 97.3 °F (36.3 °C) 90 17 (!) 105/59 98 %       LABS: .  Recent Results (from the past 24 hour(s))   GLUCOSE, POC    Collection Time: 06/20/21  4:50 PM   Result Value Ref Range    Glucose (POC) 116 65 - 117 mg/dL    Performed by Jed Chanel, POC    Collection Time: 06/21/21  6:33 AM   Result Value Ref Range    Glucose (POC) 125 (H) 65 - 117 mg/dL    Performed by Ansley Sawyer (JAY)        MSE:   Appearance: casually dressed; fair grooming  Behavior: calm and cooperative; no agitation  Motor: normal  Mood/Affect: slightly flat  Thought Process: coherent; organized  Thought Content: no delusions; no SI/HI  Perceptions: no hallucinations  Insight/Judgment: fair    ASSESSMENT:   1. Bipolar disorder, depressed, severe (F31.4)  2.  Cluster B Personality traits (F60.9)  3. 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 Continue the Effexor XR at 150 mg daily. 2. Continue the Seroquel at 200 mg QHS and Lamictal at 200 mg BID. 3. Continue the PRN Trazodone and Vistaril. 4. ELOS: 3-4 days. I certify that the inpatient psychiatric hospital services furnished since the previous certification/re-certification were, and continue to be, medically necessary for treatment which could reasonably be expected to improve the patients condition and/or for diagnostic study. This patient continues to need, on a daily basis, active treatment furnished directly by or under the supervision of inpatient psychiatric personnel.

## 2021-06-21 NOTE — GROUP NOTE
[facility] GROUP DOCUMENTATION INDIVIDUAL                                                                          Group Therapy Note    Date: 6/21/2021    Group Start Time: 1300  Group End Time: 1350  Group Topic: Process Group - Inpatient    [facility]    IP [facility] GROUP DOCUMENTATION GROUP    Group Therapy Note    Focus of session was based on article Six Principles for Managing Impulses to Maximize Life Satisfaction and Success.   We reviewed the definition of an impulse which is an impelling force or a sudden wish or urge that prompts an unpremeditated act or feeling.   We spoke about the definition of being impulsive which is characterized by actions based on sudden desires, whims, or inclinations rather than careful thought.   We spoke about a method to try and control impulses and it is PURRRRS which stands for Pause, Use, Reflect, Reason, Respond, Revise, and Stabilize. We spoke about what our impulsive behavior is and how going through these steps may help us to slow down and how may that impact the emotional consequences to be more positive rather than negative. I shared the six principles from the article which included: know your risks, plan for your risks, count to 10 a lot, Be mindful, get corrective feedback, and remember that [place] was not built in a day. Attendance: Attended    Patient's Goal:       Develop safety plan for times when triggered in stressful situations             Interventions/techniques: Informed, Provide feedback, Reinforced and Supported    Follows Directions:  Followed directions    Interactions: Interacted appropriately    Mental Status: Congruent and Restricted    Behavior/appearance: Attentive and Cooperative    Goals Achieved: Able to engage in interactions, Able to listen to others, Able to reflect/comment on own behavior, Able to receive feedback, Able to self-disclose, Discussed coping, Identified feelings, Identified triggers and Identified resources and support systems      Additional Notes:  Pt participated in the group activity . She was more talkative and engaging today. She disclosed that around the age of 15 she found out that her sister was really her mother. She took care of her mother when she was older before she . Pt stated she does not have contact with her brother and very limited contact with her sister(Sister will send her money at times). The relationship with her children appears to be strained as well. She is angry regarding the lack of support from her family.     Sona Neil

## 2021-06-22 LAB
GLUCOSE BLD STRIP.AUTO-MCNC: 121 MG/DL (ref 65–117)
GLUCOSE BLD STRIP.AUTO-MCNC: 129 MG/DL (ref 65–117)
SERVICE CMNT-IMP: ABNORMAL
SERVICE CMNT-IMP: ABNORMAL

## 2021-06-22 PROCEDURE — 65220000003 HC RM SEMIPRIVATE PSYCH

## 2021-06-22 PROCEDURE — 74011250637 HC RX REV CODE- 250/637: Performed by: PSYCHIATRY & NEUROLOGY

## 2021-06-22 PROCEDURE — 82962 GLUCOSE BLOOD TEST: CPT

## 2021-06-22 PROCEDURE — 99231 SBSQ HOSP IP/OBS SF/LOW 25: CPT | Performed by: PSYCHIATRY & NEUROLOGY

## 2021-06-22 PROCEDURE — 74011000250 HC RX REV CODE- 250: Performed by: PSYCHIATRY & NEUROLOGY

## 2021-06-22 RX ADMIN — VENLAFAXINE HYDROCHLORIDE 150 MG: 150 CAPSULE, EXTENDED RELEASE ORAL at 09:10

## 2021-06-22 RX ADMIN — LEVOTHYROXINE SODIUM 100 MCG: 0.05 TABLET ORAL at 06:00

## 2021-06-22 RX ADMIN — TRAZODONE HYDROCHLORIDE 100 MG: 100 TABLET ORAL at 21:23

## 2021-06-22 RX ADMIN — ASPIRIN 81 MG CHEWABLE TABLET 81 MG: 81 TABLET CHEWABLE at 09:10

## 2021-06-22 RX ADMIN — QUETIAPINE FUMARATE 200 MG: 200 TABLET ORAL at 21:23

## 2021-06-22 RX ADMIN — LAMOTRIGINE 200 MG: 100 TABLET ORAL at 09:09

## 2021-06-22 RX ADMIN — ATORVASTATIN CALCIUM 40 MG: 40 TABLET, FILM COATED ORAL at 09:09

## 2021-06-22 RX ADMIN — METFORMIN HYDROCHLORIDE 500 MG: 500 TABLET ORAL at 09:10

## 2021-06-22 RX ADMIN — METFORMIN HYDROCHLORIDE 500 MG: 500 TABLET ORAL at 17:03

## 2021-06-22 RX ADMIN — LAMOTRIGINE 200 MG: 100 TABLET ORAL at 18:03

## 2021-06-22 RX ADMIN — CYCLOBENZAPRINE 10 MG: 10 TABLET, FILM COATED ORAL at 09:09

## 2021-06-22 RX ADMIN — LISINOPRIL 5 MG: 5 TABLET ORAL at 09:10

## 2021-06-22 RX ADMIN — CYCLOBENZAPRINE 10 MG: 10 TABLET, FILM COATED ORAL at 18:03

## 2021-06-22 NOTE — PROGRESS NOTES
INTERVAL Hx:  Records and clinical information were reviewed. States she's \"OK\", and that her mood is only minimally dysphoric at this point. Still anxious about her disposition, but realizes it's her best (only) options. Again discussed the D/C plans--going to a shelter first, and then to the [facility] once her check comes in. Tolerating the meds easily--no SE's at all. Slept \"well\" again last night. Glucs: 125-129 the past 24 hours.     MEDS:  Current Facility-Administered Medications   Medication Dose Route Frequency    lidocaine 4 % patch 1 Patch  1 Patch TransDERmal Q24H    traZODone (DESYREL) tablet 100 mg  100 mg Oral QHS PRN    ziprasidone (GEODON) 10 mg in sterile water (preservative free) 0.5 mL injection  10 mg IntraMUSCular BID PRN    LORazepam (ATIVAN) injection 1 mg  1 mg Oral Q6H PRN    LORazepam (ATIVAN) tablet 1 mg  1 mg Oral Q6H PRN    hydrOXYzine pamoate (VISTARIL) capsule 25 mg  25 mg Oral TID PRN    magnesium hydroxide (MILK OF MAGNESIA) 400 mg/5 mL oral suspension 30 mL  30 mL Oral DAILY PRN    alum-mag hydroxide-simeth (MYLANTA) oral suspension 30 mL  30 mL Oral Q4H PRN    metFORMIN (GLUCOPHAGE) tablet 500 mg  500 mg Oral BID WITH MEALS    aspirin chewable tablet 81 mg  81 mg Oral DAILY    atorvastatin (LIPITOR) tablet 40 mg  40 mg Oral DAILY    lamoTRIgine (LaMICtal) tablet 200 mg  200 mg Oral BID    venlafaxine-SR (EFFEXOR-XR) capsule 150 mg  150 mg Oral DAILY WITH BREAKFAST    levothyroxine (SYNTHROID) tablet 100 mcg  100 mcg Oral 6am    cyclobenzaprine (FLEXERIL) tablet 10 mg  10 mg Oral BID    lisinopriL (PRINIVIL, ZESTRIL) tablet 5 mg  5 mg Oral DAILY    QUEtiapine (SEROquel) tablet 200 mg  200 mg Oral QHS    acetaminophen (TYLENOL) tablet 650 mg  650 mg Oral Q4H PRN       VITALS:   Patient Vitals for the past 12 hrs:   Temp Pulse Resp BP SpO2   06/22/21 0745 (!) 96.4 °F (35.8 °C) 95 18 110/62 95 %       LABS: .  Recent Results (from the past 24 hour(s))   GLUCOSE, POC Collection Time: 06/21/21  4:31 PM   Result Value Ref Range    Glucose (POC) 129 (H) 65 - 117 mg/dL    Performed by Kin Fabry    GLUCOSE, POC    Collection Time: 06/22/21  6:42 AM   Result Value Ref Range    Glucose (POC) 129 (H) 65 - 117 mg/dL    Performed by Bindu Barnett (RN)        MSE:   Appearance: casually dressed; fair grooming  Behavior: calm and cooperative; no agitation  Motor: normal  Mood/Affect: slightly flat  Thought Process: coherent; organized  Thought Content: no delusions; no SI/HI  Perceptions: no hallucinations  Insight/Judgment: adequate    ASSESSMENT:   1. Bipolar disorder, depressed, severe (F31.4)  2.  Cluster B Personality traits (F60.9)  3. 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 Continue the Effexor XR at 150 mg daily. 2. Continue the Seroquel at 200 mg QHS and Lamictal at 200 mg BID. 3. Continue the PRN Trazodone and Vistaril. 4. ELOS: 2-4 days--need to confirm admission to [facility] and then D/C her to a shelter.

## 2021-06-22 NOTE — GROUP NOTE
[facility]  GROUP DOCUMENTATION INDIVIDUAL                                                                          Group Therapy Note    Date: 6/21/2021    Group Start Time: 2000  Group End Time: 2030  Group Topic: Community Meeting    [address] [facility]NA    IP [facility] GROUP DOCUMENTATION GROUP    Group Therapy Note    Attendees: 5/8         Attendance: Attended    Patient's Goal:  To learn to control her anger. Interventions/techniques: Supported    Follows Directions: Followed directions    Interactions: Interacted appropriately    Mental Status: Calm    Behavior/appearance: Cooperative    Goals Achieved: Able to engage in interactions      Additional Notes:  She has anxiety and depression at a 5; she does not have any pain at this time.     Can Tong CNA

## 2021-06-22 NOTE — PROGRESS NOTES
Problem: Anxiety  Goal: *Alleviation of anxiety  Outcome: Progressing Towards Goal  Goal: *Alleviation of anxiety (Palliative Care)  Outcome: Resolved/Met     Problem: Patient Education: Go to Patient Education Activity  Goal: Patient/Family Education  Outcome: Progressing Towards Goal

## 2021-06-22 NOTE — BH NOTES
Affect constricted, mood depressed/anxious. Alert and Oriented X 4. Cooperative . Attended and participated in group. Interacted with staff and peers. Makes needs known. Ate allsnacks. Denied SI/HI/AVH. Medication compliant. Stable with no s/s of distress.   Slept for 7 hours and 45 min    PRN Medication Documentation    Specific patient behavior that led to need for PRN medication: insomnia  Staff interventions attempted prior to PRN being given: requested  PRN medication given: trazodone 100mg po at 2119  Patient response/effectiveness of PRN medication: tolerated

## 2021-06-22 NOTE — GROUP NOTE
TU  GROUP DOCUMENTATION INDIVIDUAL                                                                          Group Therapy Note    Date: 6/22/2021    Group Start Time: 1100  Group End Time: 1150  Group Topic: Process Group - Inpatient    [address] Judit Velez, [address] [place] [address] GROUP DOCUMENTATION GROUP    Group Therapy Note    Attendees: Focus of session was based on information from [facility] on tips for processing changes. We spoke about our typical reactions to change and how we cope or don't cope with it. We spoke about what changes are coming up and what can be exciting about it but also scary. We spoke about the tips from the handout which included focusing on what you can control, writing out feelings, keeping up with self care, finding support, tune into the good, creating plans, and thinking of your strength and resilience. We spoke about how journaling worries and fears can help us then plan for what actions we can take. Group members worked through a change that is happening or that they can plan for to help with the transition and not lead to setbacks in their recovery. Attendance: Attended    Patient's Goal:       Expression of positive future orientation that includes meaningful activity            Interventions/techniques: Informed, Validated, Promoted peer support and Supported    Follows Directions:  Followed directions    Interactions: Interacted appropriately    Mental Status: Calm, Congruent and Preoccupied    Behavior/appearance: Attentive, Cooperative, Neatly groomed, Needed prompting and Negative    Goals Achieved: Able to engage in interactions, Able to give feedback to another, Able to reflect/comment on own behavior, Able to receive feedback, Able to experience relief/decrease in symptoms, Able to self-disclose, Identified feelings, Identified triggers and Identified relapse prevention strategies      Additional Notes:  Qian Stephens participated in group with prompting. She spoke about how she wants to go to [facility] that has some small short term housing options and he has agreed that she needs to get into a stable situation. She spoke about how she knows that she needs to adjust to some things as \"I don't like to be around anyone. I like to be by myself and sit in a corner and play the games I like on my phone. \"  We spoke about how she can prepare for this upcoming change in her living situation which will take some adjustments.        Manas Savage

## 2021-06-22 NOTE — GROUP NOTE
TU  GROUP DOCUMENTATION INDIVIDUAL                                                                          Group Therapy Note    Date: 6/22/2021    Group Start Time: 9015  Group End Time: 6508  Group Topic: Community Meeting    [address] [facility] GROUP DOCUMENTATION GROUP    Group Therapy Note    Attendees: 4         Attendance: Attended    Patient's Goal:  To fine a place to live    Interventions/techniques: Provide feedback    Follows Directions: Followed directions    Interactions: Interacted appropriately    Mental Status: Anxious and Depressed    Behavior/appearance: Attentive and Cooperative    Goals Achieved: Able to engage in interactions and Able to listen to others      Additional Notes:  Patient said that she have constant pain all the time and no suicidal thoughts.     Giuliana Nino

## 2021-06-22 NOTE — PROGRESS NOTES
Problem: Falls - Risk of  Goal: *Absence of Falls  Description: Document Luis Alfredo Ledbetter Fall Risk and appropriate interventions in the flowsheet. Note: Fall Risk Interventions:  Mobility Interventions: Utilize walker, cane, or other assistive device         Medication Interventions: Teach patient to arise slowly    Elimination Interventions:  Toilet paper/wipes in reach    History of Falls Interventions: Room close to nurse's station         Problem: Patient Education: Go to Patient Education Activity  Goal: Patient/Family Education  Outcome: Resolved/Met     Problem: Psychosis  Goal: *STG: Decreased hallucinations  Outcome: Resolved/Met  Goal: *STG: Patient will verbalize areas in need of boundary recognition and limit setting  Outcome: Resolved/Met  Goal: *STG: Patient will verbalize issues that get in their way of progress (i.e.: anger, fear etc.)  Outcome: Resolved/Met  Goal: *STG/LTG: Demonstrates improved social functioning by responding appropriately to staff  Outcome: Resolved/Met     Problem: Risk of Harm to Self or Others  Goal: Risk of harm to self or others interventions  Outcome: Resolved/Met

## 2021-06-22 NOTE — MASTER TREATMENT PLAN
Master Treatment Plan Review for Rodarte Jace    Date of Admission Date Treatment Plan Reviewed Anticipated Date of Discharge Legal Status    6/13/21 6/21/21 TBD Voluntary     Treatment & Discharge Planning Changes    Modality or Intervention Changes   N/A   Psychotropic Medication Changes N/a   Discharge Planning or Target Date Changes ELOS 3-4 days   New Issues        Treatment Team Signatures    I have participated in the development of this plan of treatment and agree to its implementation.     Patient Signature       Patient Printed Name Date/Time   /Therapist Signature       //Therapist Printed Name Date/Time   RN Signature       RN Printed Name Date/Time   Unit  Signature       Unit  Printed Name Date/Time   MD Signature       MD Printed Name Date/Time

## 2021-06-22 NOTE — BH NOTES
Shift change report given to [facility] Te-MoakANTHONY CANCINO re: SBAR, medications, and behavior.   Jose fall risk score: 3

## 2021-06-23 LAB
GLUCOSE BLD STRIP.AUTO-MCNC: 127 MG/DL (ref 65–117)
GLUCOSE BLD STRIP.AUTO-MCNC: 139 MG/DL (ref 65–117)
SERVICE CMNT-IMP: ABNORMAL
SERVICE CMNT-IMP: ABNORMAL

## 2021-06-23 PROCEDURE — 82962 GLUCOSE BLOOD TEST: CPT

## 2021-06-23 PROCEDURE — 74011250637 HC RX REV CODE- 250/637: Performed by: PSYCHIATRY & NEUROLOGY

## 2021-06-23 PROCEDURE — 65220000003 HC RM SEMIPRIVATE PSYCH

## 2021-06-23 PROCEDURE — 99231 SBSQ HOSP IP/OBS SF/LOW 25: CPT | Performed by: PSYCHIATRY & NEUROLOGY

## 2021-06-23 RX ADMIN — METFORMIN HYDROCHLORIDE 500 MG: 500 TABLET ORAL at 17:08

## 2021-06-23 RX ADMIN — ASPIRIN 81 MG CHEWABLE TABLET 81 MG: 81 TABLET CHEWABLE at 09:11

## 2021-06-23 RX ADMIN — QUETIAPINE FUMARATE 200 MG: 200 TABLET ORAL at 21:36

## 2021-06-23 RX ADMIN — CYCLOBENZAPRINE 10 MG: 10 TABLET, FILM COATED ORAL at 09:11

## 2021-06-23 RX ADMIN — LISINOPRIL 5 MG: 5 TABLET ORAL at 09:11

## 2021-06-23 RX ADMIN — TRAZODONE HYDROCHLORIDE 100 MG: 100 TABLET ORAL at 21:36

## 2021-06-23 RX ADMIN — ATORVASTATIN CALCIUM 40 MG: 40 TABLET, FILM COATED ORAL at 09:11

## 2021-06-23 RX ADMIN — LAMOTRIGINE 200 MG: 100 TABLET ORAL at 17:08

## 2021-06-23 RX ADMIN — METFORMIN HYDROCHLORIDE 500 MG: 500 TABLET ORAL at 09:11

## 2021-06-23 RX ADMIN — CYCLOBENZAPRINE 10 MG: 10 TABLET, FILM COATED ORAL at 17:08

## 2021-06-23 RX ADMIN — LAMOTRIGINE 200 MG: 100 TABLET ORAL at 09:11

## 2021-06-23 RX ADMIN — VENLAFAXINE HYDROCHLORIDE 150 MG: 150 CAPSULE, EXTENDED RELEASE ORAL at 09:11

## 2021-06-23 RX ADMIN — LEVOTHYROXINE SODIUM 100 MCG: 0.05 TABLET ORAL at 06:07

## 2021-06-23 NOTE — BH NOTES
Pt attended recreational group and was an active participant. Today's group was playing ball with peers and staff. Pt was attentive and cooperative during group.

## 2021-06-23 NOTE — BH NOTES
Shift change report given to Forrest Hall RN re: SBAR, medications, and behavior.   Jose fall risk score 3

## 2021-06-23 NOTE — GROUP NOTE
TU  GROUP DOCUMENTATION INDIVIDUAL                                                                          Group Therapy Note    Date: 6/23/2021    Group Start Time: 1957  Group End Time: 3308  Group Topic: Community Meeting    [facility] GROUP DOCUMENTATION GROUP    Group Therapy Note    Attendees: 5         Attendance: Attended    Patient's Goal:  To keep from being angry    Interventions/techniques: Provide feedback    Follows Directions: Followed directions    Interactions: Interacted appropriately    Mental Status: Anxious    Behavior/appearance: Attentive and Cooperative    Goals Achieved: Able to engage in interactions and Able to listen to others      Additional Notes:  Patient had no pain and no suicidal thoughts.     Glendora Phalen

## 2021-06-23 NOTE — BH NOTES
Affect blunted, mood depressed. Pt attended and participated in scheduled group activities. Pt was social and appropriate with staff and peers. Pt interacted well. Pt denies SI/HI/AVH/Pain. Pt ate 100% of snack. Pt was compliant with medications and requested a PRN. Pt continues to utilize a walker for assistance with ambulation. Pt is in no apparent distress at this time and made no delusional statements. Pt rested in her bed with her eyes closed for 7.25 hours.     PRN Medication Documentation    Specific patient behavior that led to need for PRN medication: restless  Staff interventions attempted prior to PRN being given: Pt request  PRN medication given: Trazodone 50 mg PO @ 2123  Patient response/effectiveness of PRN medication: Pt rested

## 2021-06-23 NOTE — GROUP NOTE
TU  GROUP DOCUMENTATION INDIVIDUAL                                                                          Group Therapy Note    Date: 6/22/2021    Group Start Time: 2000  Group End Time: 2030  Group Topic: Community Meeting    [address] GROUP DOCUMENTATION GROUP    Group Therapy Note    Attendees: 4         Attendance: Attended    Patient's Goal:  To find a place to live . Interventions/techniques: Provide feedback    Follows Directions: Followed directions    Interactions: Interacted appropriately    Mental Status: Anxious and Depressed    Behavior/appearance: Cooperative    Goals Achieved: Able to listen to others      Additional Notes:  Patient said she had a good day . Patient said she has lower back pain 6/10 ,anxiety 5/10 ,depression 2/10,  and no SI .     [facility]

## 2021-06-23 NOTE — PROGRESS NOTES
INTERVAL Hx:  Records and clinical information were reviewed. States she's still feeling \"OK\", and that her mood is likely \"as good as it's gonna get\" at this point. No SI or hopelessness, and no emotional lability or agitation. Discussed Tx and D/C options and she understands she'll be going to a shelter in [facility] and that she'll need to fill out the [employer] and [employer] paperwork to qualify for a [facility]. Tolerating the meds easily--no SE's at all. Slept 7.25 hours last night.      MEDS:  Current Facility-Administered Medications   Medication Dose Route Frequency    traZODone (DESYREL) tablet 100 mg  100 mg Oral QHS PRN    ziprasidone (GEODON) 10 mg in sterile water (preservative free) 0.5 mL injection  10 mg IntraMUSCular BID PRN    LORazepam (ATIVAN) injection 1 mg  1 mg Oral Q6H PRN    LORazepam (ATIVAN) tablet 1 mg  1 mg Oral Q6H PRN    hydrOXYzine pamoate (VISTARIL) capsule 25 mg  25 mg Oral TID PRN    magnesium hydroxide (MILK OF MAGNESIA) 400 mg/5 mL oral suspension 30 mL  30 mL Oral DAILY PRN    alum-mag hydroxide-simeth (MYLANTA) oral suspension 30 mL  30 mL Oral Q4H PRN    metFORMIN (GLUCOPHAGE) tablet 500 mg  500 mg Oral BID WITH MEALS    aspirin chewable tablet 81 mg  81 mg Oral DAILY    atorvastatin (LIPITOR) tablet 40 mg  40 mg Oral DAILY    lamoTRIgine (LaMICtal) tablet 200 mg  200 mg Oral BID    venlafaxine-SR (EFFEXOR-XR) capsule 150 mg  150 mg Oral DAILY WITH BREAKFAST    levothyroxine (SYNTHROID) tablet 100 mcg  100 mcg Oral 6am    cyclobenzaprine (FLEXERIL) tablet 10 mg  10 mg Oral BID    lisinopriL (PRINIVIL, ZESTRIL) tablet 5 mg  5 mg Oral DAILY    QUEtiapine (SEROquel) tablet 200 mg  200 mg Oral QHS    acetaminophen (TYLENOL) tablet 650 mg  650 mg Oral Q4H PRN       VITALS:   Patient Vitals for the past 12 hrs:   Temp Pulse Resp BP SpO2   06/23/21 0911 -- 82 -- 121/64 --   06/23/21 0726 (!) 96.2 °F (35.7 °C) 82 16 121/64 96 %       LABS: .  Recent Results (from the past 24 hour(s)) GLUCOSE, POC    Collection Time: 06/22/21  4:35 PM   Result Value Ref Range    Glucose (POC) 121 (H) 65 - 117 mg/dL    Performed by John Gracia (PCT)    GLUCOSE, POC    Collection Time: 06/23/21  6:23 AM   Result Value Ref Range    Glucose (POC) 139 (H) 65 - 117 mg/dL    Performed by Lissette Zuniga RN        MSE:   Appearance: casually dressed; fair grooming  Behavior: calm and cooperative; no agitation  Motor: normal  Mood/Affect: slightly flat  Thought Process: coherent; organized  Thought Content: no delusions; no SI/HI  Perceptions: no hallucinations  Insight/Judgment: adequate    ASSESSMENT:   1. Bipolar disorder, depressed, severe (F31.4)  2.  Cluster B Personality traits (F60.9)  3. 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 Continue the Effexor XR at 150 mg daily. 2. Continue the Seroquel at 200 mg QHS and Lamictal at 200 mg BID. 3. Continue the PRN Trazodone and Vistaril. 4. ELOS: 1-2 days--[facility] [address].

## 2021-06-23 NOTE — PROGRESS NOTES
Problem: Falls - Risk of  Goal: *Absence of Falls  Description: Document Emilio Gonzalez Fall Risk and appropriate interventions in the flowsheet. Outcome: Progressing Towards Goal  Note: Fall Risk Interventions:  Mobility Interventions: Utilize walker, cane, or other assistive device         Medication Interventions: Teach patient to arise slowly    Elimination Interventions:  Toilet paper/wipes in reach    History of Falls Interventions: Room close to nurse's station

## 2021-06-23 NOTE — GROUP NOTE
TU  GROUP DOCUMENTATION INDIVIDUAL                                                                          Group Therapy Note    Date: 6/23/2021    Group Start Time: 0900  Group End Time: 0578  Group Topic: Process Group - Inpatient    [facility]    [place], [address] [address] [facility] GROUP DOCUMENTATION GROUP    Group Therapy Note    Focus of session was on positive self talk when feeling anger. We spoke about how we can develop the skill of helping ourselves out of moments of anger that have a tendency to then impact your day and your functioning and well being. We spoke about the usual self talk that goes with anger and how it can make things worse and asked group members to share their anger thoughts that are typical for them. I shared some ideas for self talk of managing anger such as I don't need to prove myself in this situation or as long as I keep my cool, I am in control or people are going to act the way they want to, not the way I want them to.   We spoke about how these ideas can better sooth our anger and help us refocus on healthy self care. Attendance: Attended    Patient's Goal:      Develop safety plan for times when triggered in stressful situations             Interventions/techniques: Informed, Validated, Reinforced and Supported    Follows Directions:  Followed directions    Interactions: Interacted appropriately    Mental Status: Calm and Congruent    Behavior/appearance: Attentive, Cooperative and Motivated    Goals Achieved: Able to engage in interactions, Able to listen to others, Able to reflect/comment on own behavior, Able to manage/cope with feelings, Able to receive feedback, Able to experience relief/decrease in symptoms, Able to self-disclose, Discussed coping, Discussed self-esteem issues, Discussed safety plan, Identified feelings, Identified triggers and Increased hopefulness      Additional Notes:  Pt participated with the group activity and engaged with the other group members. She stated at the end of group that she had been agitated at a pt as his behavior was disruptive and his words hurtful. We had discussed in group ways in which we handle our anger. She usually will say something out loud or attempt to get away from the situation and walk. She was able to manage her feelings in an appropriate and positive way.     Bin Tate

## 2021-06-23 NOTE — PROGRESS NOTES
Problem: Psychosis  Goal: *STG: Develop safety plan for times when triggered in stressful situations  Outcome: Progressing Towards Goal

## 2021-06-23 NOTE — BH NOTES
Affect flat, mood anxious. Alert and Oriented X 4. Cooperative and slightly guarded. Attended and participated in group. Interacted with staff and peers. Makes needs known. Ate all meals and snacks. Denied SI/HI/AVH. Medication compliant. Stable with no s/s of distress.

## 2021-06-23 NOTE — ROUTINE PROCESS
Shift change report given to David Calles Rn, re: SBAR. Medications, and behavior.   Jose Fall Risk Score (3)

## 2021-06-24 VITALS
WEIGHT: 192 LBS | OXYGEN SATURATION: 96 % | DIASTOLIC BLOOD PRESSURE: 59 MMHG | TEMPERATURE: 96.5 F | BODY MASS INDEX: 31.99 KG/M2 | HEART RATE: 87 BPM | SYSTOLIC BLOOD PRESSURE: 116 MMHG | HEIGHT: 65 IN | RESPIRATION RATE: 16 BRPM

## 2021-06-24 LAB
GLUCOSE BLD STRIP.AUTO-MCNC: 146 MG/DL (ref 65–117)
SERVICE CMNT-IMP: ABNORMAL

## 2021-06-24 PROCEDURE — 74011250637 HC RX REV CODE- 250/637: Performed by: PSYCHIATRY & NEUROLOGY

## 2021-06-24 PROCEDURE — 99239 HOSP IP/OBS DSCHRG MGMT >30: CPT | Performed by: PSYCHIATRY & NEUROLOGY

## 2021-06-24 PROCEDURE — 82962 GLUCOSE BLOOD TEST: CPT

## 2021-06-24 RX ORDER — VENLAFAXINE HYDROCHLORIDE 150 MG/1
150 CAPSULE, EXTENDED RELEASE ORAL
Qty: 30 CAPSULE | Refills: 0 | Status: SHIPPED | OUTPATIENT
Start: 2021-06-25

## 2021-06-24 RX ORDER — GUAIFENESIN 100 MG/5ML
81 LIQUID (ML) ORAL DAILY
Qty: 30 TABLET | Refills: 0 | Status: SHIPPED | OUTPATIENT
Start: 2021-06-25

## 2021-06-24 RX ORDER — TRAZODONE HYDROCHLORIDE 100 MG/1
100 TABLET ORAL
Qty: 30 TABLET | Refills: 0 | Status: SHIPPED | OUTPATIENT
Start: 2021-06-24

## 2021-06-24 RX ORDER — QUETIAPINE FUMARATE 200 MG/1
200 TABLET, FILM COATED ORAL
Qty: 30 TABLET | Refills: 0 | Status: ON HOLD | OUTPATIENT
Start: 2021-06-24 | End: 2022-04-12

## 2021-06-24 RX ORDER — LAMOTRIGINE 200 MG/1
200 TABLET ORAL 2 TIMES DAILY
Qty: 60 TABLET | Refills: 0 | Status: SHIPPED | OUTPATIENT
Start: 2021-06-24

## 2021-06-24 RX ORDER — METFORMIN HYDROCHLORIDE 500 MG/1
500 TABLET ORAL 2 TIMES DAILY WITH MEALS
Qty: 60 TABLET | Refills: 0 | Status: SHIPPED | OUTPATIENT
Start: 2021-06-24

## 2021-06-24 RX ORDER — LISINOPRIL 5 MG/1
5 TABLET ORAL DAILY
Qty: 30 TABLET | Refills: 0 | Status: SHIPPED | OUTPATIENT
Start: 2021-06-24

## 2021-06-24 RX ORDER — ATORVASTATIN CALCIUM 40 MG/1
40 TABLET, FILM COATED ORAL DAILY
Qty: 30 TABLET | Refills: 0 | Status: SHIPPED | OUTPATIENT
Start: 2021-06-25 | End: 2022-05-23 | Stop reason: ALTCHOICE

## 2021-06-24 RX ORDER — LEVOTHYROXINE SODIUM 100 UG/1
100 TABLET ORAL
Qty: 30 TABLET | Refills: 0 | Status: ON HOLD | OUTPATIENT
Start: 2021-06-25 | End: 2022-04-12

## 2021-06-24 RX ADMIN — LAMOTRIGINE 200 MG: 100 TABLET ORAL at 08:30

## 2021-06-24 RX ADMIN — CYCLOBENZAPRINE 10 MG: 10 TABLET, FILM COATED ORAL at 08:30

## 2021-06-24 RX ADMIN — ASPIRIN 81 MG CHEWABLE TABLET 81 MG: 81 TABLET CHEWABLE at 08:30

## 2021-06-24 RX ADMIN — LISINOPRIL 5 MG: 5 TABLET ORAL at 08:30

## 2021-06-24 RX ADMIN — ATORVASTATIN CALCIUM 40 MG: 40 TABLET, FILM COATED ORAL at 08:30

## 2021-06-24 RX ADMIN — VENLAFAXINE HYDROCHLORIDE 150 MG: 150 CAPSULE, EXTENDED RELEASE ORAL at 08:30

## 2021-06-24 RX ADMIN — HYDROXYZINE PAMOATE 25 MG: 25 CAPSULE ORAL at 08:30

## 2021-06-24 RX ADMIN — LEVOTHYROXINE SODIUM 100 MCG: 0.05 TABLET ORAL at 06:24

## 2021-06-24 RX ADMIN — METFORMIN HYDROCHLORIDE 500 MG: 500 TABLET ORAL at 08:30

## 2021-06-24 NOTE — PROGRESS NOTES
Problem: Depressed Mood (Adult/Pediatric)  Goal: *STG: Verbalizes anger, guilt, and other feelings in a constructive manor  Outcome: Progressing Towards Goal  Goal: *LTG: Returns to previous level of functioning and participates with after care plan  Outcome: Progressing Towards Goal

## 2021-06-24 NOTE — GROUP NOTE
TU  GROUP DOCUMENTATION INDIVIDUAL                                                                          Group Therapy Note    Date: 6/24/2021    Group Start Time: 1300  Group End Time: 1350  Group Topic: Process Group - Inpatient    [address] [place]    Group Therapy Note    Attendees: Focus of session was based on article [id number] from [employer].   We discussed how hearing about other peoples expereineces and ideas for coping can help people the most with developing their own coping strategies. I shared the list with group which included: its okay to not be okay right now (allow your feelings), you cant change a situation by worrying about it, use your senses to stay in the moment, know that you deserve to get help, don't feel shame or weak for needing medications, strive for happiness, not perfection, don't focus on being happy right now, just be okay, remember no one is judging you the way you are, you don't have to set yourself on fire to make other people happy, let people help you and teach them how to do it, and don't let other peoples choices become yours. We spoke about ways in which group members can incorporate these skills and use them to help themselves today.   (193          Attendance: {[facility] DOC ATTENDANCE:86834}    Patient's Goal:  ***    Interventions/techniques: {[facility] GROUP DOC INTERVENTIONS/TECHNIQUES:98779}    Follows Directions: {[facility] FOLLOWS DIRECTION:74595}    Interactions: {[facility] INTERACTIONS:45122}    Mental Status: {[facility] GROUP DOC MENTAL STATUS:20971}    Behavior/appearance: {[facility] GROUP DOC BEHAVIOR/APPEARANCE:72953}    Goals Achieved: {[facility] GROUP DOC GOALS ACHIEVED:59989}      Additional Notes:  ***    Judson Velez

## 2021-06-24 NOTE — BH NOTES
Affect blunted, mood calm. Pt attended and participated in scheduled group activities. Pt was social and appropriate with staff and peers. Pt stated that she is looking forward to discharge tomorrow but is a little anxious about where she is going. Pt denies SI/HI/AVH/Pain. Pt ate 100% of snack and was compliant with diabetic protocols. Pt was complaint with medications and requested a PRN. Pt continues to utilize a walker for assistance with ambulation. Pt is in no apparent distress at this time and made no delusional statements. Pt rested in her bed with her eyes closed for 8.25 hours.     PRN Medication Documentation    Specific patient behavior that led to need for PRN medication: restless  Staff interventions attempted prior to PRN being given: Pt request  PRN medication given: Trazodone 100 mg PO @ 2136  Patient response/effectiveness of PRN medication: Pt rested

## 2021-06-24 NOTE — SUICIDE SAFETY PLAN
SAFETY PLAN    A suicide Safety Plan is a document that supports someone when they are having thoughts of suicide. Warning Signs that indicate a suicidal crisis may be developing: What (situations, thoughts, feelings, body sensations, behaviors, etc.) do you experience that lets you know you are beginning to think about suicide? 1. Patient said that she feel deserted by everyone. 2. N/A  3. N/A    Internal Coping Strategies:  What things can I do (relaxation techniques, hobbies, physical activities, etc.) to take my mind off my problems without contacting another person? 1. Walk  2. Read  3. Color    People and social settings that provide distraction: Who can I call or where can I go to distract me? 1. Name: Mayra/friend             Phone number in her phone  2. Name: Mariela/daughter     Phone number in her phone  3. Place: N/A        4. Place: N/A    People whom I can ask for help: Who can I call when I need help - for example, friends, family, clergy, someone else? 1. Name: [facility]               Phone: Number in her phone  2. Name: daughter        Phone: Number in her phone  3. Name: N/A                     Phone: N/A    Professionals or [facility] agencies I can contact during a crisis: Who can I call for help - for example, my doctor, my psychiatrist, my psychologist, a mental health provider, a suicide hotline? 1. Clin[facility]  Phone: [phone]      Clinician Pager or Emergency Contact #: N/A    2. Clinician Name: N/A   Phone: N/A      Clinician Pager or Emergency Contact #: N/A    3. Suicide Prevention Lifeline: 4-709-306-TALK (4642)    4. [address] [place] [address] Emergency Services -  for example, [facility] suicide hotline, [facility]: 3-769.787.9946      Emergency Services Address: N/A      Emergency Services Phone: N/A    Making the environment safe:  How can I make my environment (house/apartment/living space) safer? For example, can I remove guns, medications, and other items? 1. Make sure Medication safety caps tight. 2. Keep Medication out of reach of others,children, and pets.

## 2021-06-24 NOTE — BH NOTES
Discharge Note:    Patient discharged today. Patient alert & oriented x 4. Patient speech clear and coherent. Patient answers questions apparent. Patient denies SI/HI/AVH/pain. No delusional statements made. Medication compliant. Patient given PRN Vistaril 50 mg po @ 0830 for anxiety with positive effects. Patient appetite good ate 100% diabetic breakfast plus a snack. Patient in no apparent distress all vital signs within normal limits. RN reviewed discharge orders and instructions with patient which included medication drug name, purpose, doses, administration times, route, and adverse effects. Patient verbalized understanding and provided written copies of discharge orders and instructions along with prescriptions. Patient left the unit via wheelchair @ 1038 wearing a face mask and companied by staff. All personal valuables and belongings sent with patient.

## 2021-06-24 NOTE — DISCHARGE INSTRUCTIONS
Patient Education        BEHAVIORAL HEALTH NURSING DISCHARGE NOTE      The following personal items collected during your admission are returned to you:   Dental Appliance:    Vision: Visual Aid: Glasses, With patient  Hearing Aid:    Jewelry:    Clothing: Clothing: Footwear, Pants, Shirt, Undergarments  Other Valuables:    Valuables sent to safe: Personal Items Sent to Safe: ,ss card,[facility] [employer],visa debit,cell phone,va ,va ebt,[facility] [employer],medicare,1plug ,3cords,1blue vapor,vaporjui      PATIENT INSTRUCTIONS:    What to do at Home:    You may resume normal activities. Avoid making any critical decisions for at least 24-hours. Recommended diet: Diabetic Diet and Low fat, Low cholesterol. Recommended activity: Activity as tolerated. [place] [facility] [phone]. Smoking Cessation Hotline 3-547-QUIT NOW (709-0110). If you have problems relating to your recovery, call your physician. The discharge information has been reviewed with the patient. The patient verbalized understanding. Bipolar Disorder: Care Instructions  Your Care Instructions     Bipolar disorder is an illness that causes extreme mood changes, from times of very high energy (manic episodes) to times of depression. But many people with bipolar disorder show only the symptoms of depression. These moods may cause problems with your work, school, family life, friendships, and how well you function. This disease is also called manic-depression. There is no cure for bipolar disorder, but it can be helped with medicines. Counseling may also help. It is important to take your medicines exactly as prescribed, even when you feel well. You may need lifelong treatment. Follow-up care is a key part of your treatment and safety. Be sure to make and go to all appointments, and call your doctor if you are having problems.  It's also a good idea to know your test results and keep a list of the medicines you take.  How can you care for yourself at home? · Be safe with medicines. Take your medicines exactly as prescribed. Do not stop or change a medicine without talking to your doctor first. Rome Beth and your doctor may need to try different combinations of medicines to find what works for you. · Take your medicines on schedule to keep your moods even. When you feel good, you may think that you do not need your medicines. But it is important to keep taking them. · Go to your counseling sessions. Call and talk with your counselor if you can't go to a session or if you don't think the sessions are helping. Do not just stop going. · Get at least 30 minutes of activity on most days of the week. Walking is a good choice. You also may want to do other things, such as running, swimming, or cycling. · Get enough sleep. Keep your room dark and quiet. Try to go to bed at the same time every night. · Eat a healthy diet. This includes whole grains, dairy, fruits, vegetables, and protein. Eat foods from each of these groups. · Try to lower your stress. Manage your time, build a strong support system, and lead a healthy lifestyle. To lower your stress, try physical activity, slow deep breathing, or getting a massage. · Do not use alcohol, marijuana, or illegal drugs. · Learn the early signs of your mood changes. You can then take steps to help yourself feel better. · Ask for help from friends and family when you need it. You may need help with daily chores when you are depressed. When you are manic, you may need support to control your high energy levels. What should you do if someone in your family has bipolar disorder? · Learn about the disease and signs it's getting worse. · Remind your family member you love them. · Make a plan with all family members about how to take care of your loved one when symptoms are bad. · Remind yourself it will take time for changes to occur.   · Try not to blame yourself for the disease. · Know your legal rights and the legal rights of your family member. Support groups or counselors can help with this information. · Take care of yourself. Keep up with your interests, such as career, hobbies, and friends. Use exercise, positive self-talk, deep breathing, and other relaxing exercises to help lower your stress. · Give yourself time to grieve. You may need to deal with emotions such as anger, fear, and frustration. · If you are having a hard time with your feelings or with your relationship with your family member, talk with a counselor. When should you call for help? Call 911 anytime you think you may need emergency care. For example, call if:    · You feel like hurting yourself or someone else.     · Someone who has bipolar disorder displays dangerous behavior, and you think the person might hurt himself or herself or someone else. Call your doctor now or seek immediate medical care if:    · You hear voices.     · Someone you know has bipolar disorder and talks about suicide. Keep the numbers for these national suicide hotlines: 5-730-177-TALK (7-570.553.4837) and 2-050-CSAXCTP (8-424.146.1016). If a suicide threat seems real, with a specific plan and a way to carry it out, stay with the person, or ask someone you trust to stay with the person, until you can get help.     · Someone you know has bipolar disorder and:  ? Starts to give away possessions. ? Is using illegal drugs or drinking alcohol heavily. ? Talks or writes about death, including writing suicide notes or talking about guns, knives, or pills. ? Talks or writes about hurting someone else. ? Starts to spend a lot of time alone. ? Acts very aggressively or suddenly appears calm. ? Talks about beliefs that are not based in reality (delusions). Watch closely for changes in your health, and be sure to contact your doctor if:    · You cannot go to your counseling sessions. Where can you learn more?   Go to http://www.gray.com/  Enter K052 in the search box to learn more about \"Bipolar Disorder: Care Instructions. \"  Current as of: September 23, 2020               Content Version: 12.8  © 2006-2021 [employer]. Care instructions adapted under license by [employer] (which disclaims liability or warranty for this information). If you have questions about a medical condition or this instruction, always ask your healthcare professional. [place] [address] any warranty or liability for your use of this information. Patient Education        Learning About Mood Disorders  What are mood disorders? Mood disorders are medical problems that affect how you feel. They can impact your moods, thoughts, and actions. Mood disorders include:  · Depression. This causes you to feel sad or hopeless for much of the time. · Bipolar disorder. This causes extreme mood changes from manic episodes of very high energy to extreme lows of depression. · Seasonal affective disorder (SAD). This is a type of depression that affects you during the same season each year. Most often people experience SAD during the fall and winter months when days are shorter and there is less light. What are the symptoms? Depression  You may:  · Feel sad or hopeless nearly every day. · Lose interest in or not get pleasure from most daily activities. You feel this way nearly every day. · Have low energy, changes in your appetite, or changes in how well you sleep. · Have trouble concentrating. · Think about death and suicide. Keep the numbers for these national suicide hotlines: 2-611-830-TALK (2-107.742.8382) and 7-719-ZLXYQDV (2-950.334.3032). If you or someone you know talks about suicide or feeling hopeless, get help right away. Bipolar disorder  Symptoms depend on your mood swings. You may:  · Feel very happy, energetic, or on edge. · Feel like you need very little sleep.   · Feel overly self-confident. · Do impulsive things, such as spending a lot of money. · Feel sad or hopeless. · Have racing thoughts or trouble thinking and making decisions. · Lose interest in things you have enjoyed in the past.  · Think about death and suicide. Keep the numbers for these national suicide hotlines: 8-789-238-TALK (1-768.718.7100) and 9-434-HXIUUSX (5-628.483.2139). If you or someone you know talks about suicide or feeling hopeless, get help right away. Seasonal affective disorder (SAD)  Symptoms come and go at about the same time each year. For most people with SAD, symptoms come during the winter when there is less daylight. You may:  · Feel sad, grumpy, marquez, or anxious. · Lose interest in your usual activities. · Eat more and crave carbohydrates, such as bread and pasta. · Gain weight. · Sleep more and feel drowsy during the daytime. How are mood disorders treated? Mood disorders can be treated with medicines or counseling, or a combination of both. Medicines for depression and SAD may include antidepressants. Medicines for bipolar disorder may include:  · Mood stabilizers. · Antipsychotics. · Benzodiazepines. Counseling may involve cognitive-behavioral therapy. It teaches you how to change the ways you think and behave. This can help you stop thinking bad thoughts about yourself and your life. Light therapy is the main treatment for SAD. This therapy uses a special kind of lamp. You let the lamp shine on you at certain times, usually in the morning. This may help your symptoms during the months when there is less sunlight. Healthy lifestyle  Healthy lifestyle changes may help you feel better. · Be active often. You might try walking or strength training. · Eat a healthy diet. Include fruits, vegetables, lean proteins, and whole grains in your diet each day. · Keep a regular sleep schedule. Try for 8 hours of sleep a night.   · Find ways to manage stress, such as relaxation exercises. · Avoid alcohol and illegal drugs. Follow-up care is a key part of your treatment and safety. Be sure to make and go to all appointments, and call your doctor if you are having problems. It's also a good idea to know your test results and keep a list of the medicines you take. Where can you learn more? Go to http://www.gray.com/  Enter Z126 in the search box to learn more about \"Learning About Mood Disorders. \"  Current as of: September 23, 2020               Content Version: 12.8  © 2006-2021 [employer]. Care instructions adapted under license by [employer] (which disclaims liability or warranty for this information). If you have questions about a medical condition or this instruction, always ask your healthcare professional. [address] any warranty or liability for your use of this information. Patient Education        Learning About High Blood Pressure  What is high blood pressure? Blood pressure is a measure of how hard the blood pushes against the walls of your arteries. It's normal for blood pressure to go up and down throughout the day. But if it stays up, you have high blood pressure. Another name for high blood pressure is hypertension. Two numbers tell you your blood pressure. The first number is the systolic pressure (top number). It shows how hard the blood pushes when your heart is pumping. The second number is the diastolic pressure (bottom number). It shows how hard the blood pushes between heartbeats, when your heart is relaxed and filling with blood. Your doctor will give you a goal for your blood pressure based on your health and your age. High blood pressure (hypertension) means that the top number stays high, or the bottom number stays high, or both. High blood pressure increases the risk of stroke, heart attack, and other problems. What happens when you have high blood pressure?   · Blood flows through your arteries with too much force. Over time, this can damage the heart and the walls of your arteries. But you can't feel it. High blood pressure usually doesn't cause symptoms. · High blood pressure makes your heart work harder. And that can lead to heart failure, which means your heart doesn't pump as much blood as your body needs. · Fat and calcium start to build up in your arteries. This buildup is called hardening of the arteries. It can cause many problems including a heart attack and stroke. · Arteries also carry blood and oxygen to organs like your eyes, kidneys, and brain. If high blood pressure damages those arteries, it can lead to vision loss, kidney disease, stroke, and a higher risk of dementia. How can you prevent high blood pressure? · Stay at a healthy weight. · Try to limit how much sodium you eat to less than 2,300 milligrams (mg) a day. If you limit your sodium to 1,500 mg a day, you can lower your blood pressure even more. ? Buy foods that are labeled \"unsalted,\" \"sodium-free,\" or \"low-sodium. \" Foods labeled \"reduced-sodium\" and \"light sodium\" may still have too much sodium. ? Flavor your food with garlic, lemon juice, onion, vinegar, herbs, and spices instead of salt. Do not use soy sauce, steak sauce, onion salt, garlic salt, mustard, or ketchup on your food. ? Use less salt (or none) when recipes call for it. You can often use half the salt a recipe calls for without losing flavor. · Be physically active. Get at least 30 minutes of exercise on most days of the week. Walking is a good choice. You also may want to do other activities, such as running, swimming, cycling, or playing tennis or team sports. · Limit alcohol to 2 drinks a day for men and 1 drink a day for women. · Eat plenty of fruits, vegetables, and low-fat dairy products. Eat less saturated and total fats. How is high blood pressure treated?   · Your doctor will suggest making lifestyle changes to help your heart. For example, your doctor may ask you to eat healthy foods, quit smoking, lose extra weight, and be more active. · If lifestyle changes don't help enough, your doctor may recommend that you take medicine. · When blood pressure is very high, medicines are needed to lower it. Follow-up care is a key part of your treatment and safety. Be sure to make and go to all appointments, and call your doctor if you are having problems. It's also a good idea to know your test results and keep a list of the medicines you take. Where can you learn more? Go to http://www.massey.com/  Enter P501 in the search box to learn more about \"Learning About High Blood Pressure. \"  Current as of: August 31, 2020               Content Version: 12.8  © 6657-3159 [employer]. Care instructions adapted under license by [employer] (which disclaims liability or warranty for this information). If you have questions about a medical condition or this instruction, always ask your healthcare professional. [place] [address] any warranty or liability for your use of this information. Patient Education        Learning About Type 2 Diabetes  What is type 2 diabetes? Type 2 diabetes is a condition in which you have too much sugar (glucose) in your blood. Glucose is a type of sugar produced in your body when carbohydrates and other foods are digested. It provides energy to cells throughout the body. Normally, blood sugar levels increase after you eat a meal. When blood sugar rises, cells in the pancreas release insulin, which causes the body to absorb sugar from the blood and lowers the blood sugar level to normal.  When you have type 2 diabetes, sugar stays in the blood rather than entering the body's cells to be used for energy. This results in high blood sugar. It happens when your body can't use insulin the right way.   Over time, high blood sugar can harm many parts of the body, such as your eyes, heart, blood vessels, nerves, and kidneys. It can also increase your risk for other health problems (complications). What can you expect with type 2 diabetes? Nhi Jimenezdayami keep hearing about how important it is to keep your blood sugar within a target range. That's because over time, high blood sugar can lead to serious problems. It can:  · Harm your eyes, nerves, and kidneys. · Damage your blood vessels, leading to heart disease and stroke. · Reduce blood flow and cause nerve damage to parts of your body, especially your feet. This can cause slow healing and pain when you walk. · Make your immune system weak and less able to fight infections. When people hear the word \"diabetes,\" they often think of problems like these. But daily care and treatment can help prevent or delay these problems. The goal is to keep your blood sugar in a target range. That's the best way to reduce your chance of having more problems from diabetes. What are the symptoms? Some people who have type 2 diabetes may not have any symptoms early on. Many people with the disease don't even know they have it at first. But with time, diabetes starts to cause symptoms. You have most symptoms of type 2 diabetes when your blood sugar is either too high or too low. The most common symptoms of high blood sugar include:  · Thirst.  · Needing to urinate often. · Weight loss. · Blurry vision. The symptoms of low blood sugar include:  · Sweating. · Shakiness. · Weakness. · Hunger. · Confusion. You're not likely to get symptoms of low blood sugar unless you take insulin or use certain diabetes medicines that lower blood sugar. How can you help prevent type 2 diabetes? There are things you can do to help prevent type 2 diabetes. Stay at a healthy weight. Exercise regularly, and eat healthy foods. Even small changes can make a difference.  If you have prediabetes, the medicine metformin can help prevent type 2 diabetes. How is type 2 diabetes treated? Treatment for type 2 diabetes will change over time to meet your needs. But the focus of your treatment will usually be to keep your blood sugar levels in your target range. This will help prevent problems such as eye, kidney, heart, blood vessel, and nerve disease. Some people may need medicines to help their bodies make insulin or decrease insulin resistance. Some medicines slow down how quickly the body absorbs carbohydrates. Treatment to manage type 2 diabetes includes:  · Making healthy food choices and being active. · Losing weight, if you need to. · Seeing your doctor regularly. · Keeping your blood sugar in your target range. · Taking medicines, if you need them. · Quitting smoking, if you smoke. · Keeping your blood pressure and cholesterol under control. Follow-up care is a key part of your treatment and safety. Be sure to make and go to all appointments, and call your doctor if you are having problems. It's also a good idea to know your test results and keep a list of the medicines you take. Where can you learn more? Go to http://www.gray.com/  Enter [id number] in the search box to learn more about \"Learning About Type 2 Diabetes. \"  Current as of: August 31, 2020               Content Version: 12.8  © 2006-2021 [employer]. Care instructions adapted under license by [employer] (which disclaims liability or warranty for this information). If you have questions about a medical condition or this instruction, always ask your healthcare professional. [place] [address] any warranty or liability for your use of this information. Patient Education        DASH Diet: Care Instructions  Your Care Instructions     The DASH diet is an eating plan that can help lower your blood pressure. DASH stands for Dietary Approaches to Stop Hypertension. Hypertension is high blood pressure.   The DASH diet focuses on eating foods that are high in calcium, potassium, and magnesium. These nutrients can lower blood pressure. The foods that are highest in these nutrients are fruits, vegetables, low-fat dairy products, nuts, seeds, and legumes. But taking calcium, potassium, and magnesium supplements instead of eating foods that are high in those nutrients does not have the same effect. The DASH diet also includes whole grains, fish, and poultry. The DASH diet is one of several lifestyle changes your doctor may recommend to lower your high blood pressure. Your doctor may also want you to decrease the amount of sodium in your diet. Lowering sodium while following the DASH diet can lower blood pressure even further than just the DASH diet alone. Follow-up care is a key part of your treatment and safety. Be sure to make and go to all appointments, and call your doctor if you are having problems. It's also a good idea to know your test results and keep a list of the medicines you take. How can you care for yourself at home? Following the DASH diet  · Eat 4 to 5 servings of fruit each day. A serving is 1 medium-sized piece of fruit, ½ cup chopped or canned fruit, 1/4 cup dried fruit, or 4 ounces (½ cup) of fruit juice. Choose fruit more often than fruit juice. · Eat 4 to 5 servings of vegetables each day. A serving is 1 cup of lettuce or raw leafy vegetables, ½ cup of chopped or cooked vegetables, or 4 ounces (½ cup) of vegetable juice. Choose vegetables more often than vegetable juice. · Get 2 to 3 servings of low-fat and fat-free dairy each day. A serving is 8 ounces of milk, 1 cup of yogurt, or 1 ½ ounces of cheese. · Eat 6 to 8 servings of grains each day. A serving is 1 slice of bread, 1 ounce of dry cereal, or ½ cup of cooked rice, pasta, or cooked cereal. Try to choose whole-grain products as much as possible. · Limit lean meat, poultry, and fish to 2 servings each day.  A serving is 3 ounces, about the size of a deck of cards. · Eat 4 to 5 servings of nuts, seeds, and legumes (cooked dried beans, lentils, and split peas) each week. A serving is 1/3 cup of nuts, 2 tablespoons of seeds, or ½ cup of cooked beans or peas. · Limit fats and oils to 2 to 3 servings each day. A serving is 1 teaspoon of vegetable oil or 2 tablespoons of salad dressing. · Limit sweets and added sugars to 5 servings or less a week. A serving is 1 tablespoon jelly or jam, ½ cup sorbet, or 1 cup of lemonade. · Eat less than 2,300 milligrams (mg) of sodium a day. If you limit your sodium to 1,500 mg a day, you can lower your blood pressure even more. · Be aware that all of these are the suggested number of servings for people who eat 1,800 to 2,000 calories a day. Your recommended number of servings may be different if you need more or fewer calories. Tips for success  · Start small. Do not try to make dramatic changes to your diet all at once. You might feel that you are missing out on your favorite foods and then be more likely to not follow the plan. Make small changes, and stick with them. Once those changes become habit, add a few more changes. · Try some of the following:  ? Make it a goal to eat a fruit or vegetable at every meal and at snacks. This will make it easy to get the recommended amount of fruits and vegetables each day. ? Try yogurt topped with fruit and nuts for a snack or healthy dessert. ? Add lettuce, tomato, cucumber, and onion to sandwiches. ? Combine a ready-made pizza crust with low-fat mozzarella cheese and lots of vegetable toppings. Try using tomatoes, squash, spinach, broccoli, carrots, cauliflower, and onions. ? Have a variety of cut-up vegetables with a low-fat dip as an appetizer instead of chips and dip. ? Sprinkle sunflower seeds or chopped almonds over salads. Or try adding chopped walnuts or almonds to cooked vegetables. ? Try some vegetarian meals using beans and peas.  Add garbanzo or kidney beans to salads. Make burritos and tacos with mashed lam beans or black beans. Where can you learn more? Go to http://www.jaja.tv.com/  Enter H967 in the search box to learn more about \"DASH Diet: Care Instructions. \"  Current as of: August 31, 2020               Content Version: 12.8  © 1148-8267 [employer]. Care instructions adapted under license by [employer] (which disclaims liability or warranty for this information). If you have questions about a medical condition or this instruction, always ask your healthcare professional. [place] [address] any warranty or liability for your use of this information.

## 2021-06-24 NOTE — ROUTINE PROCESS
Shift change report given to Kalyan Hopkins Rn, re: SBAR. Medications, and behavior.   Jose Fall Risk Score (3)

## 2021-06-24 NOTE — GROUP NOTE
TU  GROUP DOCUMENTATION INDIVIDUAL                                                                          Group Therapy Note    Date: 6/23/2021    Group Start Time: 2000  Group End Time: 2030  Group Topic: Community Meeting    [facility] GROUP DOCUMENTATION GROUP    Group Therapy Note    Attendees: 6         Attendance: Attended    Patient's Goal:  To keep from being angry . Interventions/techniques: Provide feedback    Follows Directions: Followed directions    Interactions: Interacted appropriately    Mental Status: Anxious    Behavior/appearance: Cooperative    Goals Achieved: Able to listen to others, Able to reflect/comment on own behavior, Able to self-disclose and Identified feelings      Additional Notes:  Patient said she had a strange good day . Patient said her anxiety is  6/10 . Patient said she has no pain ,no depression , and no SI . Patient said her goal is still in the works .     [facility]

## 2021-06-25 NOTE — DISCHARGE SUMMARY
[facility] DISCHARGE    Name:  Fariba Posadas  MR#:  [id number]  :  1960  ACCOUNT #:  [de-identified]  ADMIT DATE:  2021  DISCHARGE DATE:  2021    [facility] DISCHARGE SUMMARY    NOTE:  Greater than 35 minutes was spent in the preparation of this discharge. DISCHARGE DIAGNOSES:  Axis I:  1. Bipolar disorder, depressed, moderate to severe (F31.4). 2.  History of cocaine and alcohol abuse, reportedly in remission (F14.21 and F10.21). Axis II:  Deferred - some cluster B traits. Axis III:  Diabetes mellitus; asthma; hypertension; hyperlipidemia; hypothyroidism. Axis IV:  Stressors are moderate to severe (chronic homelessness; little to no support system). Axis V:  Global Assessment of Functioning at discharge is 70. DISCHARGE MEDICATIONS:  1. Effexor  mg daily. 2.  Seroquel 200 mg at bedtime. 3.  Lamictal 200 mg b.i.d.  4.  Trazodone 100 mg at bedtime p.r.n. for sleep. 5.  Lisinopril 5 mg daily. 6.  Metformin 500 mg b.i.d.  7.  Lipitor 40 mg at bedtime. 8.  Aspirin 81 mg daily. 9.  Synthroid 100 mcg daily. She was given a 30-day prescription for each of these medicines with no refills, to take with her. 10.  Albuterol inhaler 1 puff q.4 h. p.r.n. 11.  Flexeril 10 mg b.i.d. p.r.n. She had suitable supplies of these two medicines and supplies of most of the other medicines listed above, with her. DISPOSITION/FOLLOWUP PLANS:  The patient is being discharged in stable condition this morning on 2021. She will be provided Medicaid transportation to the [facility] center for [facility] in order to enter one of their shelters. She will follow up with the [facility], and she will also need to seek out a primary care physician in [place]. The plan is for her to fill out an auxiliary yamilet and once for her monthly check comes in, she will be able to transition to a group home or assisted living facility.     HOSPITAL COURSE:  As noted in the admission note by Dr. Carlin López, the patient is a  70-year-old female who was admitted voluntarily from the [facility] Emergency Room due to worsening depressive symptoms including some suicidal thoughts. She had been feeling suicidal for 2 weeks, with thoughts of taking an overdose, and indicated it was in response to the fact that \"no one cares,\" and that she could not get any help for assistance. She had been homeless for a week, after her lease was revoked where she was living. She actually was living in a house with eight other people, and had violated the lease, and she had nowhere else to turn. She was very depressed and distressed, but also very irritable. However once admitted, a lot of the irritability resolved, so that by the time of her discharge, she had not been feeling irritable or agitated whatsoever. She recognized that most of the depression was situational in nature, so we did not make any drastic changes in the medication, but we did discontinue the Cymbalta that she had been on, because she felt as though it was not effective, and we continued or restarted the Effexor XR at 150 mg daily. The Lamictal and Seroquel were continued, and the trazodone was decreased slightly from 200 mg to 100 mg, which seemed to work effectively in helping her sleep. She had no adverse side effects of the medications and, by the time of her discharge, her mood was much brighter, she was not hopeless at all, and certainly having no suicidal thoughts. Similarly, she never had any symptoms of psychosis or yonis, and her mood was fairly stable overall. Medically, her dose of metformin was decreased from 1000 mg b.i.d. to 500 mg b.i.d., and her dose of Synthroid was increased slightly to 100 mcg daily.   The Lipitor was used for her hypercholesterolemia, as she was listed as having two other statins that she had tried previously, and we basically simplified her medication regimen to the medications noted above. We had discussed with her the possibility of of some type of group home placement, which she seemed to be reluctantly resigned to doing. However, she realized that she would have to fill out Auxiliary [place] paperwork, and take care of some other administrative things and wait for her next month check to come in before she could transition into a group home, and we explained all these issues with her, etc.  The  had looked into a number of different places in the [facility] area, and there were a few options, but she was given the names and numbers of some places to consider, and she wanted to transition to the [facility] system in [place] as there were more options available to her. She was quite safe and stable for discharge both medically and psychiatrically when she was released. LABORATORY DATA:  Her Glucometer tended to average about 125-130 once stabilized on her medication. TSH was slightly elevated at 5.24 before her dose was increased, and her creatinine was slightly elevated at 1.40. Hemoglobin A1c was slightly elevated at 6.7% and a triglyceride level was elevated to 29, but other lipids were normal.  A urine drug screen was negative, an alcohol level was 0, and an EKG showed a QTc interval of 489 milliseconds.       Reese Bojorquez MD      [id number]  D:  06/24/2021 10:41  T:  06/25/2021 6:28  JOB #:  [id number]  CC:  Samantha Robles

## 2021-07-01 ENCOUNTER — APPOINTMENT (OUTPATIENT)
Dept: MRI IMAGING | Age: 61
End: 2021-07-01
Attending: PHYSICIAN ASSISTANT
Payer: MEDICARE

## 2021-07-01 ENCOUNTER — HOSPITAL ENCOUNTER (EMERGENCY)
Age: 61
Discharge: HOME | End: 2021-07-02
Attending: EMERGENCY MEDICINE
Payer: MEDICARE

## 2021-07-01 VITALS
RESPIRATION RATE: 16 BRPM | SYSTOLIC BLOOD PRESSURE: 155 MMHG | OXYGEN SATURATION: 95 % | TEMPERATURE: 97 F | DIASTOLIC BLOOD PRESSURE: 86 MMHG | HEART RATE: 90 BPM

## 2021-07-01 DIAGNOSIS — M54.50 CHRONIC LOW BACK PAIN, UNSPECIFIED BACK PAIN LATERALITY, UNSPECIFIED WHETHER SCIATICA PRESENT: Primary | ICD-10-CM

## 2021-07-01 DIAGNOSIS — G89.29 CHRONIC LOW BACK PAIN, UNSPECIFIED BACK PAIN LATERALITY, UNSPECIFIED WHETHER SCIATICA PRESENT: Primary | ICD-10-CM

## 2021-07-01 LAB
ALBUMIN SERPL-MCNC: 3.4 G/DL (ref 3.4–5)
ALBUMIN/GLOB SERPL: 1.1 {RATIO} (ref 0.8–1.7)
ALP SERPL-CCNC: 196 U/L (ref 45–117)
ALT SERPL-CCNC: 19 U/L (ref 13–56)
ANION GAP SERPL CALC-SCNC: 5 MMOL/L (ref 3–18)
AST SERPL-CCNC: 8 U/L (ref 10–38)
BASOPHILS # BLD: 0 K/UL (ref 0–0.1)
BASOPHILS NFR BLD: 1 % (ref 0–2)
BILIRUB SERPL-MCNC: 0.2 MG/DL (ref 0.2–1)
BUN SERPL-MCNC: 9 MG/DL (ref 7–18)
BUN/CREAT SERPL: 8 (ref 12–20)
CALCIUM SERPL-MCNC: 8.1 MG/DL (ref 8.5–10.1)
CHLORIDE SERPL-SCNC: 108 MMOL/L (ref 100–111)
CO2 SERPL-SCNC: 31 MMOL/L (ref 21–32)
CREAT SERPL-MCNC: 1.18 MG/DL (ref 0.6–1.3)
DIFFERENTIAL METHOD BLD: NORMAL
EOSINOPHIL # BLD: 0.1 K/UL (ref 0–0.4)
EOSINOPHIL NFR BLD: 2 % (ref 0–5)
ERYTHROCYTE [DISTWIDTH] IN BLOOD BY AUTOMATED COUNT: 14 % (ref 11.6–14.5)
GLOBULIN SER CALC-MCNC: 3.2 G/DL (ref 2–4)
GLUCOSE SERPL-MCNC: 204 MG/DL (ref 74–99)
HCT VFR BLD AUTO: 36.2 % (ref 35–45)
HGB BLD-MCNC: 12.2 G/DL (ref 12–16)
LYMPHOCYTES # BLD: 1.9 K/UL (ref 0.9–3.6)
LYMPHOCYTES NFR BLD: 32 % (ref 21–52)
MCH RBC QN AUTO: 28.8 PG (ref 24–34)
MCHC RBC AUTO-ENTMCNC: 33.7 G/DL (ref 31–37)
MCV RBC AUTO: 85.6 FL (ref 74–97)
MONOCYTES # BLD: 0.5 K/UL (ref 0.05–1.2)
MONOCYTES NFR BLD: 9 % (ref 3–10)
NEUTS SEG # BLD: 3.2 K/UL (ref 1.8–8)
NEUTS SEG NFR BLD: 56 % (ref 40–73)
PLATELET # BLD AUTO: 230 K/UL (ref 135–420)
PMV BLD AUTO: 9.9 FL (ref 9.2–11.8)
POTASSIUM SERPL-SCNC: 3.3 MMOL/L (ref 3.5–5.5)
PROT SERPL-MCNC: 6.6 G/DL (ref 6.4–8.2)
RBC # BLD AUTO: 4.23 M/UL (ref 4.2–5.3)
SODIUM SERPL-SCNC: 144 MMOL/L (ref 136–145)
WBC # BLD AUTO: 5.7 K/UL (ref 4.6–13.2)

## 2021-07-01 PROCEDURE — 74011250636 HC RX REV CODE- 250/636: Performed by: PHYSICIAN ASSISTANT

## 2021-07-01 PROCEDURE — 80053 COMPREHEN METABOLIC PANEL: CPT

## 2021-07-01 PROCEDURE — 96374 THER/PROPH/DIAG INJ IV PUSH: CPT

## 2021-07-01 PROCEDURE — 85025 COMPLETE CBC W/AUTO DIFF WBC: CPT

## 2021-07-01 PROCEDURE — 96375 TX/PRO/DX INJ NEW DRUG ADDON: CPT

## 2021-07-01 PROCEDURE — 72148 MRI LUMBAR SPINE W/O DYE: CPT

## 2021-07-01 PROCEDURE — 99283 EMERGENCY DEPT VISIT LOW MDM: CPT

## 2021-07-01 PROCEDURE — 99284 EMERGENCY DEPT VISIT MOD MDM: CPT

## 2021-07-01 RX ORDER — ONDANSETRON 2 MG/ML
4 INJECTION INTRAMUSCULAR; INTRAVENOUS
Status: COMPLETED | OUTPATIENT
Start: 2021-07-01 | End: 2021-07-01

## 2021-07-01 RX ORDER — MORPHINE SULFATE 4 MG/ML
4 INJECTION INTRAVENOUS
Status: COMPLETED | OUTPATIENT
Start: 2021-07-01 | End: 2021-07-01

## 2021-07-01 RX ADMIN — ONDANSETRON 4 MG: 2 INJECTION INTRAMUSCULAR; INTRAVENOUS at 20:05

## 2021-07-01 RX ADMIN — MORPHINE SULFATE 4 MG: 4 INJECTION, SOLUTION INTRAMUSCULAR; INTRAVENOUS at 20:06

## 2021-07-01 NOTE — ED TRIAGE NOTES
Pt arrived via [employer]. Per medic pt is complaining of chronic back pain. Per pt, she was diagnosed with a bulging disc and missed her last round of steroid injections.

## 2021-07-02 RX ORDER — METHOCARBAMOL 500 MG/1
500 TABLET, FILM COATED ORAL 4 TIMES DAILY
Qty: 16 TABLET | Refills: 0 | Status: SHIPPED | OUTPATIENT
Start: 2021-07-02 | End: 2022-05-23

## 2021-07-02 RX ORDER — PREDNISONE 10 MG/1
TABLET ORAL
Qty: 21 TABLET | Refills: 0 | Status: SHIPPED | OUTPATIENT
Start: 2021-07-02 | End: 2022-05-23

## 2021-07-02 NOTE — DISCHARGE INSTRUCTIONS
katena Activation    Thank you for requesting access to katena. Please follow the instructions below to securely access and download your online medical record. katena allows you to send messages to your doctor, view your test results, renew your prescriptions, schedule appointments, and more. How Do I Sign Up? In your internet browser, go to [email or web address]  Click on the First Time User? Click Here link in the Sign In box. You will be redirect to the New Member Sign Up page. Enter your katena Access Code exactly as it appears below. You will not need to use this code after youve completed the sign-up process. If you do not sign up before the expiration date, you must request a new code. katena Access Code: [unfilled] (This is the date your katena access code will )    Enter the last four digits of your Social Security Number (xxxx) and Date of Birth (mm/dd/yyyy) as indicated and click Submit. You will be taken to the next sign-up page. Create a katena ID. This will be your katena login ID and cannot be changed, so think of one that is secure and easy to remember. Create a katena password. You can change your password at any time. Enter your Password Reset Question and Answer. This can be used at a later time if you forget your password. Enter your e-mail address. You will receive e-mail notification when new information is available in [address] Click Sign Up. You can now view and download portions of your medical record. Click the [place] link to download a portable copy of your medical information. Additional Information    If you have questions, please visit the Frequently Asked Questions section of the katena website at [email or web address] Remember, katena is NOT to be used for urgent needs. For medical emergencies, dial 911.

## 2021-07-02 NOTE — ED NOTES
Rounded on pt   Pt alert and oriented x 4  Pt c/o pain in back and numbness in legs  Pt has good pulses   No sensation to pain in bilateral legs   Breathing equally and non labored  Pt offered comfort measures  Pt updated about plan of care  Laying in bed with call bell in reach  Will continue to monitor and assess

## 2021-07-02 NOTE — ED PROVIDER NOTES
[address] [facility]  [facility] EMERGENCY DEPT      Date: 7/1/2021  Patient Name: Tank Lu    History of Presenting Illness     Chief Complaint   Patient presents with    Back Pain     61 y.o. female with a past medical history of diabetes, hypertension, liver disease, bipolar, chronic back pain presents to the ED complaining of worsening back pain onset yesterday afternoon. Patient did not have any injury, states that since 2pm today she has not had any feeling from her bilateral hips down. Also states that she has had weakness to both legs. Patient has never had this before, has not had any surgery to her lumbar spine. She reports having prior steroid injections for lumbar spondylosis. Patient called 911 to get here today as she was unable to ambulate. She denies any bowel or bladder function loss. Patient denies any other associated signs or symptoms. Patient denies any other complaints. Nursing notes regarding the HPI and triage nursing notes were reviewed. Prior medical records were reviewed. Current Outpatient Medications   Medication Sig Dispense Refill    venlafaxine-SR (EFFEXOR-XR) 150 mg capsule Take 1 Capsule by mouth daily (with breakfast). 30 Capsule 0    traZODone (DESYREL) 100 mg tablet Take 1 Tablet by mouth nightly as needed for Sleep. 30 Tablet 0    QUEtiapine (SEROquel) 200 mg tablet Take 1 Tablet by mouth nightly. 30 Tablet 0    metFORMIN (GLUCOPHAGE) 500 mg tablet Take 1 Tablet by mouth two (2) times daily (with meals). 60 Tablet 0    lamoTRIgine (LaMICtal) 200 mg tablet Take 1 Tablet by mouth two (2) times a day. 60 Tablet 0    atorvastatin (LIPITOR) 40 mg tablet Take 1 Tablet by mouth daily. 30 Tablet 0    aspirin 81 mg chewable tablet Take 1 Tablet by mouth daily. 30 Tablet 0    levothyroxine (SYNTHROID) 100 mcg tablet Take 1 Tablet by mouth every morning. 30 Tablet 0    lisinopriL (PRINIVIL, ZESTRIL) 5 mg tablet Take 1 Tablet by mouth daily.  Indications: high blood pressure 30 Tablet 0    albuterol (PROVENTIL HFA, VENTOLIN HFA, PROAIR HFA) 90 mcg/actuation inhaler Take 1 Puff by inhalation every four (4) hours as needed for Wheezing. 1 Inhaler 3    cyclobenzaprine (FLEXERIL) 10 mg tablet Take 10 mg by mouth two (2) times a day. Indications: MUSCLE SPASM         Past History     Past Medical History:  Past Medical History:   Diagnosis Date    Asthma, chronic, moderate persistent, with acute exacerbation     Asthmatic bronchitis without complication 72/2/6329    Bipolar 1 disorder, manic, mild ([facility]     CAD (coronary artery disease)     Controlled type 2 diabetes mellitus without complication, without long-term current use of insulin (HCC)     Diabetes ([facility]     Hyperlipidemia     Hypertension     Hypothyroidism     Liver disease        Past Surgical History:  Past Surgical History:   Procedure Laterality Date    HX APPENDECTOMY  10/1969    HX BACK SURGERY       neck& back surgery    HX CERVICAL LAMINECTOMY      HX CYST REMOVAL Left     [facility]    HX HYSTERECTOMY Bilateral 10/1990    HX KNEE ARTHROSCOPY Left     torn cartilage x 2       Family History:  Family History   Problem Relation Age of Onset    Cancer Mother     Heart Disease Father        Social History:  Social History     Tobacco Use    Smoking status: Current Every Day Smoker     Packs/day: 1.00     Years: 50.00     Pack years: 50.00     Types: Cigarettes     Start date: 6/17/1974    Smokeless tobacco: Never Used   Substance Use Topics    Alcohol use: Never    Drug use: Not Currently     Types: Marijuana     Comment: as a teenager       Allergies:   Allergies   Allergen Reactions    Codeine Nausea and Vomiting and Nausea Only    Other Medication Other (comments)     Nicotine Patches - Serious \"burn like\" irritation on skin    Oxycodone-Acetaminophen Nausea and Vomiting, Nausea Only and Other (comments)     Pt can take tylenol  Pt can take tylenol      Carvedilol Other (comments)  Citalopram Unknown (comments) and Other (comments)     violent    Nicotine Other (comments)     Nicotine patches  Other reaction(s): toxic skin reaction  Nicotine patches      Tramadol Nausea Only and Other (comments)    Flu Vaccine Qs2014-15(36mo,Up) Nausea Only       Patient's primary care provider (as noted in EPIC):  Tim Vyas NP    Review of Systems   Constitutional:  Denies malaise, fever, chills. Cardiac:  Denies chest pain or palpitations. Respiratory:  Denies cough, wheezing, difficulty breathing, shortness of breath. GI/ABD:  Denies injury, pain, distention, nausea, vomiting, diarrhea. :  Denies injury, pain, dysuria or urgency. Back: + low back pain. Pelvis:  Denies injury or pain. Extremity/MS:  Denies injury or pain. Neuro: + bilateral leg numbness, weakness. Skin: Denies injury, rash, itching or skin changes. All other systems negative as reviewed. Visit Vitals  BP (!) 162/84 (BP 1 Location: Right arm, BP Patient Position: Lying)   Pulse 94   Temp 97 °F (36.1 °C)   Resp 16   SpO2 98%       PHYSICAL EXAM:  CONSTITUTIONAL:  Alert, in no apparent distress;  well developed;  well nourished. HEAD:  Normocephalic, atraumatic. EYES:  EOMI. Non-icteric sclera. Normal conjunctiva. ENTM:  Nose:  no rhinorrhea. Throat:  no erythema or exudate, mucous membranes moist.  NECK:  Supple  RESPIRATORY:  Chest clear, equal breath sounds, good air movement. Without wheezes, rhonchi or rales. CARDIOVASCULAR:  Regular rate and rhythm. No murmurs, rubs, or gallops. GI:  Normal bowel sounds, abdomen soft and non-tender. No rebound or guarding. BACK: Lumbar bony reproducible tenderness palpation, without step-off. Negative SLR. UPPER EXT:  Normal inspection. LOWER EXT: numbness noted to BLE, pt states just feels like pressure. 1/5 strength to BLE; reflexes and pulses intact.    NEURO:  Moves all four extremities, and grossly normal motor exam.  SKIN:  No rashes;  Normal for age.  PSYCH:  Alert and normal affect. ED COURSE:   MRI and morphine ordered for patient. 9:09 PM : Pt care transferred to Leeann Yoo, ED provider. History of patient complaint(s), available diagnostic reports and current treatment plan has been discussed thoroughly.    Pending diagnostics reports and/or labs (please list): JOON Robbins

## 2021-07-02 NOTE — ED NOTES
9:00 PM Assumed care of the pt at this time. Discussed with JOON Connolly concerning patient Cheryl Moser, standard discussion of reason for visit, HPI, ROS, PE, and current results available. Recommendation for obtaining pending MRI lumbar spine and bedside re-evaluation to dispo the pt. Betty Dobbs PA-C      12:12 AM MRI prelim report still not yet resulted. Called [facility] and they stated that the report was dictated. This has not crossed over as a prelim or permanent report. The  states she will have the radiologist upload the report again. Betty Dobbs PA-C     1:23 AM MRI prelim report resulted: stable disc bulge and endplate signal changes at L5-S1. Similar canal and neuroforaminal narrowing at this level. No new acute osseous process. New edema in the posterior subcutaneous mid line fat spanning L1-L5. Correlate for soft tissue trauma or strain in this area. Discussed these results with the pt at bedside. Will plan to d.c with ortho/spine follow-up. Pt agrees. Betty Dobbs PA-C     Disposition: d/c    Dictation disclaimer:  Please note that this dictation was completed with [employer], the [employer] voice recognition software. Quite often unanticipated grammatical, syntax, homophones, and other interpretive errors are inadvertently transcribed by the computer software. Please disregard these errors. Please excuse any errors that have escaped final proofreading.

## 2022-02-25 ENCOUNTER — HOSPITAL ENCOUNTER (OUTPATIENT)
Dept: LAB | Age: 62
Discharge: HOME | End: 2022-02-25

## 2022-02-25 LAB — XX-LABCORP SPECIMEN COL,LCBCF: NORMAL

## 2022-02-25 PROCEDURE — 99001 SPECIMEN HANDLING PT-LAB: CPT

## 2022-03-09 ENCOUNTER — APPOINTMENT (OUTPATIENT)
Dept: CT IMAGING | Age: 62
End: 2022-03-09
Attending: PHYSICIAN ASSISTANT
Payer: MEDICAID

## 2022-03-09 ENCOUNTER — HOSPITAL ENCOUNTER (EMERGENCY)
Age: 62
Discharge: HOME | End: 2022-03-09
Attending: STUDENT IN AN ORGANIZED HEALTH CARE EDUCATION/TRAINING PROGRAM
Payer: MEDICAID

## 2022-03-09 VITALS
HEART RATE: 94 BPM | TEMPERATURE: 98.1 F | HEIGHT: 65 IN | DIASTOLIC BLOOD PRESSURE: 91 MMHG | OXYGEN SATURATION: 95 % | SYSTOLIC BLOOD PRESSURE: 199 MMHG | WEIGHT: 180 LBS | BODY MASS INDEX: 29.99 KG/M2 | RESPIRATION RATE: 18 BRPM

## 2022-03-09 DIAGNOSIS — E27.8 ADRENAL NODULE (HCC): ICD-10-CM

## 2022-03-09 DIAGNOSIS — R19.7 DIARRHEA, UNSPECIFIED TYPE: Primary | ICD-10-CM

## 2022-03-09 DIAGNOSIS — K76.9 LIVER LESION: ICD-10-CM

## 2022-03-09 DIAGNOSIS — R42 LIGHTHEADEDNESS: ICD-10-CM

## 2022-03-09 LAB
ALBUMIN SERPL-MCNC: 4 G/DL (ref 3.4–5)
ALBUMIN/GLOB SERPL: 1.1 {RATIO} (ref 0.8–1.7)
ALP SERPL-CCNC: 146 U/L (ref 45–117)
ALT SERPL-CCNC: 24 U/L (ref 13–56)
ANION GAP SERPL CALC-SCNC: 9 MMOL/L (ref 3–18)
APPEARANCE UR: ABNORMAL
AST SERPL-CCNC: 20 U/L (ref 10–38)
BACTERIA URNS QL MICRO: ABNORMAL /HPF
BASOPHILS # BLD: 0.1 K/UL (ref 0–0.1)
BASOPHILS NFR BLD: 2 % (ref 0–2)
BILIRUB SERPL-MCNC: 0.4 MG/DL (ref 0.2–1)
BILIRUB UR QL: NEGATIVE
BUN SERPL-MCNC: 14 MG/DL (ref 7–18)
BUN/CREAT SERPL: 15 (ref 12–20)
CALCIUM SERPL-MCNC: 9.5 MG/DL (ref 8.5–10.1)
CHLORIDE SERPL-SCNC: 103 MMOL/L (ref 100–111)
CO2 SERPL-SCNC: 28 MMOL/L (ref 21–32)
COLOR UR: YELLOW
CREAT SERPL-MCNC: 0.94 MG/DL (ref 0.6–1.3)
DIFFERENTIAL METHOD BLD: ABNORMAL
EOSINOPHIL # BLD: 0.3 K/UL (ref 0–0.4)
EOSINOPHIL NFR BLD: 4 % (ref 0–5)
EPITH CASTS URNS QL MICRO: ABNORMAL /LPF (ref 0–5)
ERYTHROCYTE [DISTWIDTH] IN BLOOD BY AUTOMATED COUNT: 14.3 % (ref 11.6–14.5)
GLOBULIN SER CALC-MCNC: 3.5 G/DL (ref 2–4)
GLUCOSE SERPL-MCNC: 122 MG/DL (ref 74–99)
GLUCOSE UR STRIP.AUTO-MCNC: NEGATIVE MG/DL
HCT VFR BLD AUTO: 47.9 % (ref 35–45)
HGB BLD-MCNC: 15.5 G/DL (ref 12–16)
HGB UR QL STRIP: ABNORMAL
IMM GRANULOCYTES # BLD AUTO: 0 K/UL
IMM GRANULOCYTES NFR BLD AUTO: 0 %
KETONES UR QL STRIP.AUTO: 80 MG/DL
LEUKOCYTE ESTERASE UR QL STRIP.AUTO: ABNORMAL
LIPASE SERPL-CCNC: 143 U/L (ref 73–393)
LYMPHOCYTES # BLD: 2.5 K/UL (ref 0.9–3.6)
LYMPHOCYTES NFR BLD: 39 % (ref 21–52)
MAGNESIUM SERPL-MCNC: 2.6 MG/DL (ref 1.6–2.6)
MCH RBC QN AUTO: 27.3 PG (ref 24–34)
MCHC RBC AUTO-ENTMCNC: 32.4 G/DL (ref 31–37)
MCV RBC AUTO: 84.3 FL (ref 78–100)
MONOCYTES # BLD: 0.3 K/UL (ref 0.05–1.2)
MONOCYTES NFR BLD: 5 % (ref 3–10)
NEUTS SEG # BLD: 3.2 K/UL (ref 1.8–8)
NEUTS SEG NFR BLD: 50 % (ref 40–73)
NITRITE UR QL STRIP.AUTO: NEGATIVE
NRBC # BLD: 0 K/UL (ref 0–0.01)
NRBC BLD-RTO: 0 PER 100 WBC
PH UR STRIP: 6 [PH] (ref 5–8)
PLATELET # BLD AUTO: 310 K/UL (ref 135–420)
PLATELET COMMENTS,PCOM: ABNORMAL
PMV BLD AUTO: 10.7 FL (ref 9.2–11.8)
POTASSIUM SERPL-SCNC: 4 MMOL/L (ref 3.5–5.5)
PROT SERPL-MCNC: 7.5 G/DL (ref 6.4–8.2)
PROT UR STRIP-MCNC: ABNORMAL MG/DL
RBC # BLD AUTO: 5.68 M/UL (ref 4.2–5.3)
RBC #/AREA URNS HPF: ABNORMAL /HPF (ref 0–5)
RBC MORPH BLD: ABNORMAL
SODIUM SERPL-SCNC: 140 MMOL/L (ref 136–145)
SP GR UR REFRACTOMETRY: >1.03 (ref 1–1.03)
TROPONIN-HIGH SENSITIVITY: 11 NG/L (ref 0–54)
UROBILINOGEN UR QL STRIP.AUTO: 1 EU/DL (ref 0.2–1)
WBC # BLD AUTO: 6.4 K/UL (ref 4.6–13.2)
WBC MORPH BLD: ABNORMAL
WBC URNS QL MICRO: ABNORMAL /HPF (ref 0–4)

## 2022-03-09 PROCEDURE — 80053 COMPREHEN METABOLIC PANEL: CPT

## 2022-03-09 PROCEDURE — 74011000636 HC RX REV CODE- 636: Performed by: STUDENT IN AN ORGANIZED HEALTH CARE EDUCATION/TRAINING PROGRAM

## 2022-03-09 PROCEDURE — 99285 EMERGENCY DEPT VISIT HI MDM: CPT

## 2022-03-09 PROCEDURE — 87086 URINE CULTURE/COLONY COUNT: CPT

## 2022-03-09 PROCEDURE — 83690 ASSAY OF LIPASE: CPT

## 2022-03-09 PROCEDURE — 87077 CULTURE AEROBIC IDENTIFY: CPT

## 2022-03-09 PROCEDURE — 93005 ELECTROCARDIOGRAM TRACING: CPT

## 2022-03-09 PROCEDURE — 85025 COMPLETE CBC W/AUTO DIFF WBC: CPT

## 2022-03-09 PROCEDURE — 83735 ASSAY OF MAGNESIUM: CPT

## 2022-03-09 PROCEDURE — 74011250636 HC RX REV CODE- 250/636: Performed by: PHYSICIAN ASSISTANT

## 2022-03-09 PROCEDURE — 81001 URINALYSIS AUTO W/SCOPE: CPT

## 2022-03-09 PROCEDURE — 74177 CT ABD & PELVIS W/CONTRAST: CPT

## 2022-03-09 PROCEDURE — 96374 THER/PROPH/DIAG INJ IV PUSH: CPT

## 2022-03-09 PROCEDURE — 84484 ASSAY OF TROPONIN QUANT: CPT

## 2022-03-09 RX ORDER — ONDANSETRON 2 MG/ML
4 INJECTION INTRAMUSCULAR; INTRAVENOUS
Status: COMPLETED | OUTPATIENT
Start: 2022-03-09 | End: 2022-03-09

## 2022-03-09 RX ORDER — LOPERAMIDE HYDROCHLORIDE 2 MG/1
2 CAPSULE ORAL
Qty: 20 CAPSULE | Refills: 0 | Status: SHIPPED | OUTPATIENT
Start: 2022-03-09 | End: 2022-03-19

## 2022-03-09 RX ADMIN — ONDANSETRON 4 MG: 2 INJECTION INTRAMUSCULAR; INTRAVENOUS at 12:23

## 2022-03-09 RX ADMIN — SODIUM CHLORIDE 1000 ML: 9 INJECTION, SOLUTION INTRAVENOUS at 12:23

## 2022-03-09 RX ADMIN — IOPAMIDOL 100 ML: 612 INJECTION, SOLUTION INTRAVENOUS at 13:41

## 2022-03-09 NOTE — ED PROVIDER NOTES
EMERGENCY DEPARTMENT HISTORY AND PHYSICAL EXAM    12:06 PM      Date: 3/9/2022  Patient Name: Alberto Chacon    History of Presenting Illness     Chief Complaint   Patient presents with    Diarrhea    Dizziness       History Provided By: Patient    Additional History (Context): Alberto Chacon is a 64 y.o. female with hx of CAD, HTN, HLD, hypothyroidism, asthma, DM, and bipolar d/o who presents with c/o diffuse abdominal cramping and diarrhea x 1 month. Pt notes 6-7 episodes almost every day. Pt also notes associated nausea and lightheadedness x days. Denies fever/chills, chest pain, dyspnea, vomiting, melena, BRBPR, mucoid stool. Pt denies IBS, IBD, recent travel, sick contacts, abx use. PCP: Clyde JULIAN NP    Current Outpatient Medications   Medication Sig Dispense Refill    loperamide (IMODIUM) 2 mg capsule Take 1 Capsule by mouth four (4) times daily as needed for Diarrhea for up to 10 days. 20 Capsule 0    predniSONE (STERAPRED DS) 10 mg dose pack Use as directed 21 Tablet 0    methocarbamoL (Robaxin) 500 mg tablet Take 1 Tablet by mouth four (4) times daily. 16 Tablet 0    venlafaxine-SR (EFFEXOR-XR) 150 mg capsule Take 1 Capsule by mouth daily (with breakfast). 30 Capsule 0    traZODone (DESYREL) 100 mg tablet Take 1 Tablet by mouth nightly as needed for Sleep. 30 Tablet 0    QUEtiapine (SEROquel) 200 mg tablet Take 1 Tablet by mouth nightly. 30 Tablet 0    metFORMIN (GLUCOPHAGE) 500 mg tablet Take 1 Tablet by mouth two (2) times daily (with meals). 60 Tablet 0    lamoTRIgine (LaMICtal) 200 mg tablet Take 1 Tablet by mouth two (2) times a day. 60 Tablet 0    atorvastatin (LIPITOR) 40 mg tablet Take 1 Tablet by mouth daily. 30 Tablet 0    aspirin 81 mg chewable tablet Take 1 Tablet by mouth daily. 30 Tablet 0    levothyroxine (SYNTHROID) 100 mcg tablet Take 1 Tablet by mouth every morning. 30 Tablet 0    lisinopriL (PRINIVIL, ZESTRIL) 5 mg tablet Take 1 Tablet by mouth daily.  Indications: high blood pressure 30 Tablet 0    albuterol (PROVENTIL HFA, VENTOLIN HFA, PROAIR HFA) 90 mcg/actuation inhaler Take 1 Puff by inhalation every four (4) hours as needed for Wheezing. 1 Inhaler 3       Past History     Past Medical History:  Past Medical History:   Diagnosis Date    Asthma, chronic, moderate persistent, with acute exacerbation     Asthmatic bronchitis without complication 16/1/0708    Bipolar 1 disorder, manic, mild [facility] [address]     CAD (coronary artery disease)     Controlled type 2 diabetes mellitus without complication, without long-term current use of insulin (HCC)     Diabetes ([facility] [address]     Hyperlipidemia     Hypertension     Hypothyroidism     Liver disease        Past Surgical History:  Past Surgical History:   Procedure Laterality Date    HX APPENDECTOMY  10/1969    HX BACK SURGERY       neck& back surgery    HX CERVICAL LAMINECTOMY      HX CYST REMOVAL Left     [facility]    HX HYSTERECTOMY Bilateral 10/1990    HX KNEE ARTHROSCOPY Left     torn cartilage x 2       Family History:  Family History   Problem Relation Age of Onset    Cancer Mother     Heart Disease Father        Social History:  Social History     Tobacco Use    Smoking status: Current Every Day Smoker     Packs/day: 1.00     Years: 50.00     Pack years: 50.00     Types: Cigarettes     Start date: 6/17/1974    Smokeless tobacco: Never Used   Substance Use Topics    Alcohol use: Never    Drug use: Not Currently     Types: Marijuana     Comment: as a teenager       Allergies:   Allergies   Allergen Reactions    Codeine Nausea and Vomiting and Nausea Only    Other Medication Other (comments)     Nicotine Patches - Serious \"burn like\" irritation on skin    Oxycodone-Acetaminophen Nausea and Vomiting, Nausea Only and Other (comments)     Pt can take tylenol  Pt can take tylenol      Carvedilol Other (comments)    Citalopram Unknown (comments) and Other (comments)     violent    Nicotine Other (comments)     Nicotine patches  Other reaction(s): toxic skin reaction  Nicotine patches      Tramadol Nausea Only and Other (comments)    Flu Vaccine [id number](36mo,Up) Nausea Only         Review of Systems       Review of Systems   Constitutional: Negative for chills and fever. Respiratory: Negative for shortness of breath. Cardiovascular: Negative for chest pain. Gastrointestinal: Positive for abdominal pain, diarrhea and nausea. Negative for vomiting. Skin: Negative for rash. Neurological: Positive for light-headedness. Negative for weakness. All other systems reviewed and are negative. Physical Exam     Visit Vitals  BP (!) 199/91 (BP 1 Location: Left upper arm, BP Patient Position: At rest)   Pulse 94   Temp 98.1 °F (36.7 °C)   Resp 18   Ht 5' 5\" (1.651 m)   Wt 81.6 kg (180 lb)   SpO2 95%   BMI 29.95 kg/m²         Physical Exam  Vitals and nursing note reviewed. Constitutional:       General: She is not in acute distress. Appearance: Normal appearance. She is well-developed. She is not ill-appearing, toxic-appearing or diaphoretic. HENT:      Head: Normocephalic and atraumatic. Cardiovascular:      Rate and Rhythm: Normal rate and regular rhythm. Heart sounds: Normal heart sounds. No murmur heard. No friction rub. No gallop. Pulmonary:      Effort: Pulmonary effort is normal. No respiratory distress. Breath sounds: Normal breath sounds. No wheezing or rales. Abdominal:      General: Abdomen is flat. There is no distension. Palpations: Abdomen is soft. Tenderness: There is abdominal tenderness (mild diffuse tenderness). There is no right CVA tenderness, left CVA tenderness, guarding or rebound. Musculoskeletal:         General: Normal range of motion. Cervical back: Normal range of motion and neck supple. Skin:     General: Skin is warm. Findings: No rash. Neurological:      Mental Status: She is alert.            Diagnostic Study Results     Labs -  Recent Results (from the past 12 hour(s))   CBC WITH AUTOMATED DIFF    Collection Time: 03/09/22 12:10 PM   Result Value Ref Range    WBC 6.4 4.6 - 13.2 K/uL    RBC 5.68 (H) 4.20 - 5.30 M/uL    HGB 15.5 12.0 - 16.0 g/dL    HCT 47.9 (H) 35.0 - 45.0 %    MCV 84.3 78.0 - 100.0 FL    MCH 27.3 24.0 - 34.0 PG    MCHC 32.4 31.0 - 37.0 g/dL    RDW 14.3 11.6 - 14.5 %    PLATELET 065 727 - 112 K/uL    MPV 10.7 9.2 - 11.8 FL    NRBC 0.0 0  WBC    ABSOLUTE NRBC 0.00 0.00 - 0.01 K/uL    NEUTROPHILS 50 40 - 73 %    LYMPHOCYTES 39 21 - 52 %    MONOCYTES 5 3 - 10 %    EOSINOPHILS 4 0 - 5 %    BASOPHILS 2 0 - 2 %    IMMATURE GRANULOCYTES 0 %    ABS. NEUTROPHILS 3.2 1.8 - 8.0 K/UL    ABS. LYMPHOCYTES 2.5 0.9 - 3.6 K/UL    ABS. MONOCYTES 0.3 0.05 - 1.2 K/UL    ABS. EOSINOPHILS 0.3 0.0 - 0.4 K/UL    ABS. BASOPHILS 0.1 0.0 - 0.1 K/UL    ABS. IMM. GRANS. 0.0 K/UL    DF MANUAL      PLATELET COMMENTS ADEQUATE PLATELETS      RBC COMMENTS NORMOCYTIC, NORMOCHROMIC      WBC COMMENTS MODERATE REACTIVE LYMPHOCYTES PRESENT    METABOLIC PANEL, COMPREHENSIVE    Collection Time: 03/09/22 12:10 PM   Result Value Ref Range    Sodium 140 136 - 145 mmol/L    Potassium 4.0 3.5 - 5.5 mmol/L    Chloride 103 100 - 111 mmol/L    CO2 28 21 - 32 mmol/L    Anion gap 9 3.0 - 18 mmol/L    Glucose 122 (H) 74 - 99 mg/dL    BUN 14 7.0 - 18 MG/DL    Creatinine 0.94 0.6 - 1.3 MG/DL    BUN/Creatinine ratio 15 12 - 20      GFR est AA >60 >60 ml/min/1.73m2    GFR est non-AA >60 >60 ml/min/1.73m2    Calcium 9.5 8.5 - 10.1 MG/DL    Bilirubin, total 0.4 0.2 - 1.0 MG/DL    ALT (SGPT) 24 13 - 56 U/L    AST (SGOT) 20 10 - 38 U/L    Alk.  phosphatase 146 (H) 45 - 117 U/L    Protein, total 7.5 6.4 - 8.2 g/dL    Albumin 4.0 3.4 - 5.0 g/dL    Globulin 3.5 2.0 - 4.0 g/dL    A-G Ratio 1.1 0.8 - 1.7     MAGNESIUM    Collection Time: 03/09/22 12:10 PM   Result Value Ref Range    Magnesium 2.6 1.6 - 2.6 mg/dL   LIPASE    Collection Time: 03/09/22 12:10 PM   Result Value Ref Range Lipase 143 73 - 393 U/L   TROPONIN-HIGH SENSITIVITY    Collection Time: 03/09/22 12:10 PM   Result Value Ref Range    Troponin-High Sensitivity 11 0 - 54 ng/L   EKG, 12 LEAD, INITIAL    Collection Time: 03/09/22 12:16 PM   Result Value Ref Range    Ventricular Rate 66 BPM    Atrial Rate 66 BPM    P-R Interval 156 ms    QRS Duration 92 ms    Q-T Interval 466 ms    QTC Calculation (Bezet) 488 ms    Calculated P Axis 37 degrees    Calculated R Axis -6 degrees    Calculated T Axis 52 degrees    Diagnosis       Normal sinus rhythm  Minimal voltage criteria for LVH, may be normal variant ( [employer] product )  Borderline ECG  When compared with ECG of 12-MAR-2013 21:03,  No significant change was found     URINALYSIS W/ RFLX MICROSCOPIC    Collection Time: 03/09/22  2:20 PM   Result Value Ref Range    Color YELLOW      Appearance CLOUDY      Specific gravity >1.030 (H) 1.005 - 1.030    pH (UA) 6.0 5.0 - 8.0      Protein TRACE (A) NEG mg/dL    Glucose Negative NEG mg/dL    Ketone 80 (A) NEG mg/dL    Bilirubin Negative NEG      Blood TRACE (A) NEG      Urobilinogen 1.0 0.2 - 1.0 EU/dL    Nitrites Negative NEG      Leukocyte Esterase MODERATE (A) NEG     URINE MICROSCOPIC ONLY    Collection Time: 03/09/22  2:20 PM   Result Value Ref Range    WBC 20 to 30 0 - 4 /hpf    RBC 0 to 2 0 - 5 /hpf    Epithelial cells 4+ 0 - 5 /lpf    Bacteria FEW (A) NEG /hpf       Radiologic Studies -   CT ABD PELV W CONT   Final Result   1. No acute abnormality. 2. Small hypodense lesions in the liver are indeterminate. Statistically, these   are most likely cysts or hemangiomas. Further evaluation with multiphase CT or   MRI will likely be limited due to the small size (1 cm). Recommend correlation   with outside hospital studies to ensure stability (CT abdomen/pelvis 5/17/2020   and 10/7/2014 in the [employer] system, which are not available for comparison at   the time of dictation). 3. Indeterminate 1 cm left adrenal nodule.  Again, recommend correlation with   outside hospital CTs to evaluate for stability. If these studies cannot be   obtained, a follow-up adrenal protocol CT in 12 months could be considered. 4. Colonic diverticulosis. Medical Decision Making   I am the first provider for this patient. I reviewed the vital signs, available nursing notes, past medical history, past surgical history, family history and social history. Vital Signs-Reviewed the patient's vital signs. Pulse Oximetry Analysis -  95% on room air     Records Reviewed: Nursing Notes, Old Medical Records and Previous electrocardiograms (Time of Review: 12:06 PM)    ED Course: Progress Notes, Reevaluation, and Consults:  2:12 PM Reviewed results and plan with patient, feeling better, ready to go home. Printed CT report for her records. Discussed need for close outpatient follow-up this week for reassessment. Discussed strict return precautions, including fever, vomiting, or any other medical concerns. Pt in agreement with plan. Provider Notes (Medical Decision Making): 58-year-old female who presents to the ED due to abdominal pain and diarrhea x1 month. Afebrile, nontoxic-appearing, looks well. Mild diffuse abdominal tenderness without rebound or guarding. Labs without evidence of leukocytosis or electrolyte abnormality. CT abdomen pelvis without acute process. Pt unable to provide stool sample. Stable for discharge with close outpatient follow-up with PMD and GI for further assessment. Strict return precautions provided. Diagnosis     Clinical Impression:   1. Diarrhea, unspecified type    2. Lightheadedness    3. Liver lesion    4.  Adrenal nodule ([address]        Disposition: home     Follow-up Information     Follow up With Specialties Details Why [facility]    [facility] EMERGENCY DEPT Emergency Medicine  If symptoms worsen [address] [address]  R Jaydon-[address], NP Nurse Practitioner Schedule an appointment as soon as possible for a visit   [address] [facility]  [address] [facility]      Dejuan Nichols MD Gastroenterology Schedule an appointment as soon as possible for a visit   [address] [address]             Discharge Medication List as of 3/9/2022  2:24 PM      START taking these medications    Details   loperamide (IMODIUM) 2 mg capsule Take 1 Capsule by mouth four (4) times daily as needed for Diarrhea for up to 10 days. , Normal, Disp-20 Capsule, R-0         CONTINUE these medications which have NOT CHANGED    Details   predniSONE (STERAPRED DS) 10 mg dose pack Use as directed, Normal, Disp-21 Tablet, R-0      methocarbamoL (Robaxin) 500 mg tablet Take 1 Tablet by mouth four (4) times daily. , Normal, Disp-16 Tablet, R-0      venlafaxine-SR (EFFEXOR-XR) 150 mg capsule Take 1 Capsule by mouth daily (with breakfast). , Print, Disp-30 Capsule, R-0      traZODone (DESYREL) 100 mg tablet Take 1 Tablet by mouth nightly as needed for Sleep., Print, Disp-30 Tablet, R-0      QUEtiapine (SEROquel) 200 mg tablet Take 1 Tablet by mouth nightly. , Print, Disp-30 Tablet, R-0      metFORMIN (GLUCOPHAGE) 500 mg tablet Take 1 Tablet by mouth two (2) times daily (with meals). , Print, Disp-60 Tablet, R-0      lamoTRIgine (LaMICtal) 200 mg tablet Take 1 Tablet by mouth two (2) times a day., Print, Disp-60 Tablet, R-0      atorvastatin (LIPITOR) 40 mg tablet Take 1 Tablet by mouth daily. , Print, Disp-30 Tablet, R-0      aspirin 81 mg chewable tablet Take 1 Tablet by mouth daily. , Print, Disp-30 Tablet, R-0      levothyroxine (SYNTHROID) 100 mcg tablet Take 1 Tablet by mouth every morning., Print, Disp-30 Tablet, R-0      lisinopriL (PRINIVIL, ZESTRIL) 5 mg tablet Take 1 Tablet by mouth daily.  Indications: high blood pressure, Print, Disp-30 Tablet, R-0      albuterol (PROVENTIL HFA, VENTOLIN HFA, PROAIR HFA) 90 mcg/actuation inhaler Take 1 Puff by inhalation every four (4) hours as needed for Wheezing., Normal, Disp-1 Inhaler,R-3             Dictation disclaimer:  Please note that this dictation was completed with [employer], the computer voice recognition software. Quite often unanticipated grammatical, syntax, homophones, and other interpretive errors are inadvertently transcribed by the computer software. Please disregard these errors. Please excuse any errors that have escaped final proofreading.

## 2022-03-09 NOTE — DISCHARGE INSTRUCTIONS
Take medication as prescribed. Follow-up with your primary care physician within 2 days for reassessment. Bring the results from this visit with you for their review, including your CT scan results. You will need repeat CT scan within 1 year to assess size of liver lesions and adrenal nodules. Return to the ED immediately for any new, worsening, or persistent symptoms.

## 2022-03-10 LAB
ATRIAL RATE: 66 BPM
CALCULATED P AXIS, ECG09: 37 DEGREES
CALCULATED R AXIS, ECG10: -6 DEGREES
CALCULATED T AXIS, ECG11: 52 DEGREES
DIAGNOSIS, 93000: NORMAL
P-R INTERVAL, ECG05: 156 MS
Q-T INTERVAL, ECG07: 466 MS
QRS DURATION, ECG06: 92 MS
QTC CALCULATION (BEZET), ECG08: 488 MS
VENTRICULAR RATE, ECG03: 66 BPM

## 2022-03-11 LAB
BACTERIA SPEC CULT: ABNORMAL
BACTERIA SPEC CULT: ABNORMAL
CC UR VC: ABNORMAL
SERVICE CMNT-IMP: ABNORMAL

## 2022-03-18 PROBLEM — S61.218A: Status: ACTIVE | Noted: 2020-09-22

## 2022-03-18 PROBLEM — E53.8 B12 DEFICIENCY: Status: ACTIVE | Noted: 2020-09-27

## 2022-03-19 PROBLEM — F31.4 SEVERE DEPRESSED BIPOLAR I DISORDER WITHOUT PSYCHOTIC FEATURES (HCC): Status: ACTIVE | Noted: 2021-06-13

## 2022-03-19 PROBLEM — E66.01 SEVERE OBESITY (HCC): Status: ACTIVE | Noted: 2020-03-17

## 2022-03-19 PROBLEM — E11.9 DIET-CONTROLLED TYPE 2 DIABETES MELLITUS (HCC): Status: ACTIVE | Noted: 2019-12-01

## 2022-03-19 PROBLEM — R29.6 FREQUENT FALLS: Status: ACTIVE | Noted: 2020-09-27

## 2022-03-19 PROBLEM — S63.259A: Status: ACTIVE | Noted: 2020-09-22

## 2022-03-19 PROBLEM — J45.909 ASTHMATIC BRONCHITIS WITHOUT COMPLICATION: Status: ACTIVE | Noted: 2020-10-06

## 2022-03-19 PROBLEM — F17.200 SMOKER: Status: ACTIVE | Noted: 2020-01-06

## 2022-04-11 ENCOUNTER — APPOINTMENT (OUTPATIENT)
Dept: MRI IMAGING | Age: 62
End: 2022-04-11
Attending: STUDENT IN AN ORGANIZED HEALTH CARE EDUCATION/TRAINING PROGRAM
Payer: MEDICARE

## 2022-04-11 ENCOUNTER — APPOINTMENT (OUTPATIENT)
Dept: CT IMAGING | Age: 62
End: 2022-04-11
Attending: STUDENT IN AN ORGANIZED HEALTH CARE EDUCATION/TRAINING PROGRAM
Payer: MEDICARE

## 2022-04-11 ENCOUNTER — HOSPITAL ENCOUNTER (OUTPATIENT)
Age: 62
Setting detail: OBSERVATION
Discharge: HOME HEALTH | End: 2022-04-13
Attending: STUDENT IN AN ORGANIZED HEALTH CARE EDUCATION/TRAINING PROGRAM | Admitting: INTERNAL MEDICINE
Payer: MEDICARE

## 2022-04-11 DIAGNOSIS — E11.9 DIET-CONTROLLED TYPE 2 DIABETES MELLITUS (HCC): ICD-10-CM

## 2022-04-11 DIAGNOSIS — N18.30 CHRONIC KIDNEY DISEASE, STAGE 3 (MODERATE): ICD-10-CM

## 2022-04-11 DIAGNOSIS — R53.1 WEAKNESS GENERALIZED: ICD-10-CM

## 2022-04-11 DIAGNOSIS — I10 HYPERTENSION, UNSPECIFIED TYPE: ICD-10-CM

## 2022-04-11 DIAGNOSIS — R42 DIZZINESS: ICD-10-CM

## 2022-04-11 LAB
ALBUMIN SERPL-MCNC: 3.5 G/DL (ref 3.4–5)
ALBUMIN/GLOB SERPL: 1 {RATIO} (ref 0.8–1.7)
ALP SERPL-CCNC: 114 U/L (ref 45–117)
ALT SERPL-CCNC: 20 U/L (ref 13–56)
AMPHET UR QL SCN: NEGATIVE
ANION GAP SERPL CALC-SCNC: 3 MMOL/L (ref 3–18)
APPEARANCE UR: CLEAR
APTT PPP: 23.8 SEC (ref 23–36.4)
AST SERPL-CCNC: 9 U/L (ref 10–38)
ATRIAL RATE: 87 BPM
BACTERIA URNS QL MICRO: ABNORMAL /HPF
BARBITURATES UR QL SCN: NEGATIVE
BASOPHILS # BLD: 0 K/UL (ref 0–0.1)
BASOPHILS NFR BLD: 1 % (ref 0–2)
BENZODIAZ UR QL: NEGATIVE
BILIRUB DIRECT SERPL-MCNC: <0.1 MG/DL (ref 0–0.2)
BILIRUB SERPL-MCNC: 0.3 MG/DL (ref 0.2–1)
BILIRUB UR QL: NEGATIVE
BUN SERPL-MCNC: 16 MG/DL (ref 7–18)
BUN/CREAT SERPL: 12 (ref 12–20)
CALCIUM SERPL-MCNC: 9.1 MG/DL (ref 8.5–10.1)
CALCULATED P AXIS, ECG09: 37 DEGREES
CALCULATED R AXIS, ECG10: -40 DEGREES
CALCULATED T AXIS, ECG11: 70 DEGREES
CANNABINOIDS UR QL SCN: NEGATIVE
CHLORIDE SERPL-SCNC: 105 MMOL/L (ref 100–111)
CO2 SERPL-SCNC: 31 MMOL/L (ref 21–32)
COCAINE UR QL SCN: NEGATIVE
COLOR UR: YELLOW
CREAT SERPL-MCNC: 1.33 MG/DL (ref 0.6–1.3)
DIAGNOSIS, 93000: NORMAL
DIFFERENTIAL METHOD BLD: ABNORMAL
EOSINOPHIL # BLD: 0.1 K/UL (ref 0–0.4)
EOSINOPHIL NFR BLD: 1 % (ref 0–5)
EPITH CASTS URNS QL MICRO: ABNORMAL /LPF (ref 0–5)
ERYTHROCYTE [DISTWIDTH] IN BLOOD BY AUTOMATED COUNT: 15.3 % (ref 11.6–14.5)
ETHANOL SERPL-MCNC: <3 MG/DL (ref 0–3)
GLOBULIN SER CALC-MCNC: 3.4 G/DL (ref 2–4)
GLUCOSE BLD STRIP.AUTO-MCNC: 105 MG/DL (ref 70–110)
GLUCOSE BLD STRIP.AUTO-MCNC: 131 MG/DL (ref 70–110)
GLUCOSE BLD STRIP.AUTO-MCNC: 140 MG/DL (ref 70–110)
GLUCOSE SERPL-MCNC: 141 MG/DL (ref 74–99)
GLUCOSE UR STRIP.AUTO-MCNC: NEGATIVE MG/DL
HCT VFR BLD AUTO: 41.4 % (ref 35–45)
HDSCOM,HDSCOM: NORMAL
HGB BLD-MCNC: 13.6 G/DL (ref 12–16)
HGB UR QL STRIP: NEGATIVE
IMM GRANULOCYTES # BLD AUTO: 0.1 K/UL (ref 0–0.04)
IMM GRANULOCYTES NFR BLD AUTO: 1 % (ref 0–0.5)
INR PPP: 0.8 (ref 0.8–1.2)
KETONES UR QL STRIP.AUTO: NEGATIVE MG/DL
LEUKOCYTE ESTERASE UR QL STRIP.AUTO: ABNORMAL
LYMPHOCYTES # BLD: 1.8 K/UL (ref 0.9–3.6)
LYMPHOCYTES NFR BLD: 27 % (ref 21–52)
MCH RBC QN AUTO: 28.6 PG (ref 24–34)
MCHC RBC AUTO-ENTMCNC: 32.9 G/DL (ref 31–37)
MCV RBC AUTO: 87.2 FL (ref 78–100)
METHADONE UR QL: NEGATIVE
MONOCYTES # BLD: 0.5 K/UL (ref 0.05–1.2)
MONOCYTES NFR BLD: 8 % (ref 3–10)
NEUTS SEG # BLD: 4.1 K/UL (ref 1.8–8)
NEUTS SEG NFR BLD: 63 % (ref 40–73)
NITRITE UR QL STRIP.AUTO: NEGATIVE
NRBC # BLD: 0 K/UL (ref 0–0.01)
NRBC BLD-RTO: 0 PER 100 WBC
OPIATES UR QL: NEGATIVE
P-R INTERVAL, ECG05: 168 MS
PCP UR QL: NEGATIVE
PH UR STRIP: 6 [PH] (ref 5–8)
PLATELET # BLD AUTO: 242 K/UL (ref 135–420)
PMV BLD AUTO: 10.2 FL (ref 9.2–11.8)
POTASSIUM SERPL-SCNC: 4.1 MMOL/L (ref 3.5–5.5)
PROT SERPL-MCNC: 6.9 G/DL (ref 6.4–8.2)
PROT UR STRIP-MCNC: NEGATIVE MG/DL
PROTHROMBIN TIME: 11.3 SEC (ref 11.5–15.2)
Q-T INTERVAL, ECG07: 376 MS
QRS DURATION, ECG06: 84 MS
QTC CALCULATION (BEZET), ECG08: 452 MS
RBC # BLD AUTO: 4.75 M/UL (ref 4.2–5.3)
SODIUM SERPL-SCNC: 139 MMOL/L (ref 136–145)
SP GR UR REFRACTOMETRY: >1.03 (ref 1–1.03)
TROPONIN-HIGH SENSITIVITY: 6 NG/L (ref 0–54)
UROBILINOGEN UR QL STRIP.AUTO: 0.2 EU/DL (ref 0.2–1)
VENTRICULAR RATE, ECG03: 87 BPM
WBC # BLD AUTO: 6.6 K/UL (ref 4.6–13.2)
WBC URNS QL MICRO: ABNORMAL /HPF (ref 0–4)

## 2022-04-11 PROCEDURE — 74011250637 HC RX REV CODE- 250/637: Performed by: STUDENT IN AN ORGANIZED HEALTH CARE EDUCATION/TRAINING PROGRAM

## 2022-04-11 PROCEDURE — 74011250636 HC RX REV CODE- 250/636: Performed by: STUDENT IN AN ORGANIZED HEALTH CARE EDUCATION/TRAINING PROGRAM

## 2022-04-11 PROCEDURE — 74011000636 HC RX REV CODE- 636: Performed by: STUDENT IN AN ORGANIZED HEALTH CARE EDUCATION/TRAINING PROGRAM

## 2022-04-11 PROCEDURE — 85025 COMPLETE CBC W/AUTO DIFF WBC: CPT

## 2022-04-11 PROCEDURE — 93005 ELECTROCARDIOGRAM TRACING: CPT

## 2022-04-11 PROCEDURE — 80048 BASIC METABOLIC PNL TOTAL CA: CPT

## 2022-04-11 PROCEDURE — 80307 DRUG TEST PRSMV CHEM ANLYZR: CPT

## 2022-04-11 PROCEDURE — 99203 OFFICE O/P NEW LOW 30 MIN: CPT | Performed by: STUDENT IN AN ORGANIZED HEALTH CARE EDUCATION/TRAINING PROGRAM

## 2022-04-11 PROCEDURE — 2709999900 HC NON-CHARGEABLE SUPPLY

## 2022-04-11 PROCEDURE — 80076 HEPATIC FUNCTION PANEL: CPT

## 2022-04-11 PROCEDURE — 81001 URINALYSIS AUTO W/SCOPE: CPT

## 2022-04-11 PROCEDURE — 85610 PROTHROMBIN TIME: CPT

## 2022-04-11 PROCEDURE — 70551 MRI BRAIN STEM W/O DYE: CPT

## 2022-04-11 PROCEDURE — 84484 ASSAY OF TROPONIN QUANT: CPT

## 2022-04-11 PROCEDURE — 85730 THROMBOPLASTIN TIME PARTIAL: CPT

## 2022-04-11 PROCEDURE — 99285 EMERGENCY DEPT VISIT HI MDM: CPT

## 2022-04-11 PROCEDURE — 70496 CT ANGIOGRAPHY HEAD: CPT

## 2022-04-11 PROCEDURE — 70450 CT HEAD/BRAIN W/O DYE: CPT

## 2022-04-11 PROCEDURE — 82077 ASSAY SPEC XCP UR&BREATH IA: CPT

## 2022-04-11 PROCEDURE — 82962 GLUCOSE BLOOD TEST: CPT

## 2022-04-11 PROCEDURE — 99219 PR INITIAL OBSERVATION CARE/DAY 50 MINUTES: CPT | Performed by: INTERNAL MEDICINE

## 2022-04-11 PROCEDURE — G0378 HOSPITAL OBSERVATION PER HR: HCPCS

## 2022-04-11 PROCEDURE — 74011250636 HC RX REV CODE- 250/636: Performed by: INTERNAL MEDICINE

## 2022-04-11 PROCEDURE — 77030018842 HC SOL IRR SOD CL 9% BAXT -A

## 2022-04-11 RX ORDER — SODIUM CHLORIDE 9 MG/ML
100 INJECTION, SOLUTION INTRAVENOUS CONTINUOUS
Status: DISCONTINUED | OUTPATIENT
Start: 2022-04-11 | End: 2022-04-12 | Stop reason: SDUPTHER

## 2022-04-11 RX ORDER — SODIUM CHLORIDE 9 MG/ML
1000 INJECTION, SOLUTION INTRAVENOUS ONCE
Status: COMPLETED | OUTPATIENT
Start: 2022-04-11 | End: 2022-04-11

## 2022-04-11 RX ORDER — LORAZEPAM 2 MG/ML
1 INJECTION, SOLUTION INTRAMUSCULAR; INTRAVENOUS ONCE
Status: COMPLETED | OUTPATIENT
Start: 2022-04-11 | End: 2022-04-11

## 2022-04-11 RX ORDER — DIAZEPAM 2 MG/1
2 TABLET ORAL
Status: COMPLETED | OUTPATIENT
Start: 2022-04-11 | End: 2022-04-11

## 2022-04-11 RX ORDER — GUAIFENESIN 100 MG/5ML
243 LIQUID (ML) ORAL DAILY
Status: DISCONTINUED | OUTPATIENT
Start: 2022-04-12 | End: 2022-04-12

## 2022-04-11 RX ADMIN — SODIUM CHLORIDE 1000 ML: 900 INJECTION, SOLUTION INTRAVENOUS at 13:11

## 2022-04-11 RX ADMIN — IOPAMIDOL 72 ML: 755 INJECTION, SOLUTION INTRAVENOUS at 12:04

## 2022-04-11 RX ADMIN — SODIUM CHLORIDE 100 ML/HR: 900 INJECTION, SOLUTION INTRAVENOUS at 11:34

## 2022-04-11 RX ADMIN — DIAZEPAM 2 MG: 2 TABLET ORAL at 13:10

## 2022-04-11 RX ADMIN — SODIUM CHLORIDE 100 ML/HR: 900 INJECTION, SOLUTION INTRAVENOUS at 19:07

## 2022-04-11 RX ADMIN — LORAZEPAM 1 MG: 2 INJECTION, SOLUTION INTRAMUSCULAR; INTRAVENOUS at 20:58

## 2022-04-11 NOTE — H&P
Date of Admission: 4/11/2022      Assessment:   Dizziness and vision changes- rule out TIA. CTA of the head and neck without any evidence of occlusion. CT of the head without any acute infarct or hemorrhage. Has been seen by teleneurology who recommends admission. ??  Possibility of underlying psychiatric component    HTN: Currently controlled  Diabetes mellitus type 2  Chronic persistent asthma  Hypothyroidism  Bipolar disorder type I  Plan:   Stroke protocol initiated  Formal neurology consult placed by ER Joyce Haney is aware  MRI brain pending  As per teleneurology considered EEG if residual work-up is unremarkable  PT, OT and speech evaluations  As per teleneurology hold antiplatelet medications  TTE with bubble study  Permissive hypertension for 24 hours  CBC, hemoglobin A1c, lipid profile, thyroid panel CMP in a.m. Resume home medications with the exception of antihypertensives   -Please consider psychiatry consult in a.m. given concern for polypharmacy which may be contributing to her current symptoms      Angelita Humphries D.O. Internal Medicine and Infectious Diseases      Subjective:    Patient is a 64 y. o.female who is being evaluated for rule out TIA. Ms. Mumtaz Pacheco is a 70-year-old female with a past medical history significant for hypertension, hypothyroidism, diabetes mellitus type 2, coronary artery disease, bipolar disease, chronic persistent asthma who is presenting to the emergency department after developing dizziness. She tells me that she was sitting outside on her porch this morning when she had developed a left-sided headache blurred vision and dizziness. She was not doing anything in particular when this started. She denies any focal areas of weakness although she tells me that she is having a difficult time moving in general.  She states that she is unable to read and less she has her eyes closed. She has no chest pain shortness of breath cough nausea or vomiting.   She has a no other recent changes in her health. No changes in her medication recently. Denies a history of migraine headaches. Denies ever having episodes of dizziness in the past.  Denies prior histories of seizures. She states that when she tries to get up to walk she feels like she will fall down. Work-up in the ER thus far has included WBCs 136/65, HR 86 temp 98.7. CT head and CTA neck with no evidence of intracranial hemorrhage patent cervical carotid and vertebral arteries as well as intracranial circulation without any occlusion. Creatinine is at 1.33 which is her baseline, troponins 6. CBC without any acute changes compared to prior admissions. UA with moderate leukocyte esterase and 4 WBCs. Per review of old records she did have a UTI with group B strep on March 9.     Past Medical History:   Diagnosis Date    Asthma, chronic, moderate persistent, with acute exacerbation     Asthmatic bronchitis without complication 84/6/8511    Bipolar 1 disorder, manic, mild (HCC)     CAD (coronary artery disease)     Controlled type 2 diabetes mellitus without complication, without long-term current use of insulin (HCC)     Diabetes ([facility] [address]     Hyperlipidemia     Hypertension     Hypothyroidism     Liver disease      Past Surgical History:   Procedure Laterality Date    HX APPENDECTOMY  10/1969    HX BACK SURGERY       neck& back surgery    HX CERVICAL LAMINECTOMY      HX CYST REMOVAL Left     [facility]    HX HYSTERECTOMY Bilateral 10/1990    HX KNEE ARTHROSCOPY Left     torn cartilage x 2     Family History   Problem Relation Age of Onset    Cancer Mother     Heart Disease Father      Medications reviewed as below:   Current Facility-Administered Medications   Medication Dose Route Frequency Provider Last Rate Last Admin    0.9% sodium chloride infusion  100 mL/hr IntraVENous CONTINUOUS John JULIAN  mL/hr at 04/11/22 1134 100 mL/hr at 04/11/22 1134    [START ON 4/12/2022] aspirin chewable tablet 243 mg  243 mg Oral DAILY Vicente Cruz MD         Current Outpatient Medications   Medication Sig Dispense Refill    predniSONE (STERAPRED DS) 10 mg dose pack Use as directed 21 Tablet 0    methocarbamoL (Robaxin) 500 mg tablet Take 1 Tablet by mouth four (4) times daily. 16 Tablet 0    venlafaxine-SR (EFFEXOR-XR) 150 mg capsule Take 1 Capsule by mouth daily (with breakfast). 30 Capsule 0    traZODone (DESYREL) 100 mg tablet Take 1 Tablet by mouth nightly as needed for Sleep. 30 Tablet 0    QUEtiapine (SEROquel) 200 mg tablet Take 1 Tablet by mouth nightly. 30 Tablet 0    metFORMIN (GLUCOPHAGE) 500 mg tablet Take 1 Tablet by mouth two (2) times daily (with meals). 60 Tablet 0    lamoTRIgine (LaMICtal) 200 mg tablet Take 1 Tablet by mouth two (2) times a day. 60 Tablet 0    atorvastatin (LIPITOR) 40 mg tablet Take 1 Tablet by mouth daily. 30 Tablet 0    aspirin 81 mg chewable tablet Take 1 Tablet by mouth daily. 30 Tablet 0    levothyroxine (SYNTHROID) 100 mcg tablet Take 1 Tablet by mouth every morning. 30 Tablet 0    lisinopriL (PRINIVIL, ZESTRIL) 5 mg tablet Take 1 Tablet by mouth daily. Indications: high blood pressure 30 Tablet 0    albuterol (PROVENTIL HFA, VENTOLIN HFA, PROAIR HFA) 90 mcg/actuation inhaler Take 1 Puff by inhalation every four (4) hours as needed for Wheezing.  1 Inhaler 3     Allergies   Allergen Reactions    Codeine Nausea and Vomiting and Nausea Only    Other Medication Other (comments)     Nicotine Patches - Serious \"burn like\" irritation on skin    Oxycodone-Acetaminophen Nausea and Vomiting, Nausea Only and Other (comments)     Pt can take tylenol  Pt can take tylenol      Carvedilol Other (comments)    Citalopram Unknown (comments) and Other (comments)     violent    Nicotine Other (comments)     Nicotine patches  Other reaction(s): toxic skin reaction  Nicotine patches      Tramadol Nausea Only and Other (comments)    Flu Vaccine Qs2014-15(36mo,Up) Nausea Only Social History     Socioeconomic History    Marital status:      Spouse name: Not on file    Number of children: Not on file    Years of education: Not on file    Highest education level: Not on file   Occupational History    Not on file   Tobacco Use    Smoking status: Current Every Day Smoker     Packs/day: 1.00     Years: 50.00     Pack years: 50.00     Types: Cigarettes     Start date: 6/17/1974    Smokeless tobacco: Never Used   Substance and Sexual Activity    Alcohol use: Never    Drug use: Not Currently     Types: Marijuana     Comment: as a teenager    Sexual activity: Not on file   Other Topics Concern    Not on file   Social History Narrative    Not on file     Social Determinants of Health     Financial Resource Strain:     Difficulty of Paying Living Expenses: Not on file   Food Insecurity:     Worried About Running Out of Food in the Last Year: Not on file    Kamran of Food in the Last Year: Not on file   Transportation Needs:     Lack of Transportation (Medical): Not on file    Lack of Transportation (Non-Medical):  Not on file   Physical Activity:     Days of Exercise per Week: Not on file    Minutes of Exercise per Session: Not on file   Stress:     Feeling of Stress : Not on file   Social Connections:     Frequency of Communication with Friends and Family: Not on file    Frequency of Social Gatherings with Friends and Family: Not on file    Attends Sikh Services: Not on file    Active Member of Clubs or Organizations: Not on file    Attends Club or Organization Meetings: Not on file    Marital Status: Not on file   Intimate Partner Violence:     Fear of Current or Ex-Partner: Not on file    Emotionally Abused: Not on file    Physically Abused: Not on file    Sexually Abused: Not on file   Housing Stability:     Unable to Pay for Housing in the Last Year: Not on file    Number of [place] in the Last Year: Not on file    Unstable Housing in the Last Year: Not on file        Review of Systems    Negative Unless BOLDED    General: fevers, chills, myalgias, arthralgias, unexplained weight loss, malaise, fatigue. HEENT:  headaches,sinus pain or presure, recent URI, recent dental procedures;  tinnitus, hearing loss , visual changes, catarats, dizziness or blurred vision  PUlMONARY:  cough , shortness of breath, sputum production, hx of asthma or COPD. previous treatement for TB or PPD. Cardiovascular: chest pain, previous CAD/MI, vavlular heart disease,  murmurs  GI:   nausea, vomiting, diarrhea, abdominal pain, prior C.diff  :  urinary frequency, dysuria, hematuria, bladder incontinence. Neurologic:  seizures, syncope or prior CVA/TIA, confusion, memory impairment, neuropathy  Musculoskeletal:  myalgias arthralgias, joint pain/ swelling,  back pain  Skin:  Purities,  recurrent cellulitis,  chronic stasis ulcer, diabetic foot ulcers  Endocrine: polyuria, polydipsia, hair loss, weight gain  Psych: Denies depression or treatment by a psychiatrist/psycologist  Heme-Onc: prior DVT, easy bruising, fatigue, malignancy        Objective:        Visit Vitals  /65 (BP 1 Location: Right upper arm, BP Patient Position: At rest;Semi fowlers)   Pulse 77   Temp 98.7 °F (37.1 °C)   Resp 20   SpO2 93%     Temp (24hrs), Av.9 °F (37.2 °C), Min:98.7 °F (37.1 °C), Max:99 °F (37.2 °C)        General:   awake alert and oriented   Skin:   no rashes or skin lesions noted on limited exam   HEENT:  Normocephalic, atraumatic, PERRL, EOMI, no scleral icterus or pallor; no conjunctival hemmohage;  nasal and oral mucous are moist and without evidence of lesions. No thrush. Dentition fair. Neck supple, no bruits.    Lymph Nodes:   no cervical, axillary or inguinal adenopathy   Lungs:   non-labored, bilaterally clear to aspiration- no crackles wheezes rales or rhonchi   Heart:  RRR, s1 and s2; no murmurs rubs or gallops, no edema, + pedal pulses   Abdomen:  soft, non-distended, active bowel sounds, no hepatomegaly, no splenomegaly. Non-tender   Genitourinary:  deferred   Extremities:   no clubbing, cyanosis; no joint effusions or swelling; Full ROM of all large joints to the upper and lower extremities; muscle mass appropriate for age   Neurologic:  No gross focal sensory abnormalities; equal muscle strength to upper and lower extremities although generally weak and only able to lift her legs up off the bed by 4 to 5 inches. Not able to read with both eyes open but able to visualize with each eye closed. No nystagmus. speech appropirate. Follows commands. Cranial nerves intact   Psychiatric:   appropriate and interactive. Labs: Results:   Chemistry Recent Labs     04/11/22  1117   *      K 4.1      CO2 31   BUN 16   CREA 1.33*   CA 9.1   AGAP 3   BUCR 12      TP 6.9   ALB 3.5   GLOB 3.4   AGRAT 1.0      CBC w/Diff Recent Labs     04/11/22  1117   WBC 6.6   RBC 4.75   HGB 13.6   HCT 41.4      GRANS 63   LYMPH 27   EOS 1            Lab Results   Component Value Date/Time    Specimen Description: CERVIX/VAGINA 07/20/2010 11:34 AM    Lab Results   Component Value Date/Time    Culture result: (A) 03/09/2022 02:21 PM     STREPTOCOCCUS AGALACTIAE SERO GROUP B Penicillin and ampicillin are drugs of choice for treatment of beta-hemolytic streptococcal infections. Susceptibility testing of penicillins and beta-lactams approved by the FDA for treatment of beta-hemolytic streptococcal infections need not be performed routinely, because nonsusceptible isolates are extremely rare. CLSI 2012    Culture result: MIXED UROGENITAL GRETCHEN ISOLATED 03/09/2022 02:21 PM          Imaging:      All imaging reviewed from Admission to present as per radiology interpretation in [address] [place] [address]

## 2022-04-11 NOTE — PROGRESS NOTES
MRI Screening form needs to be filled out and faxed to [phone] MRI can be scheduled. If unable to obtain information from pt, [facility] needs to be contacted.  If pt is claustro or will need pain meds, please have ordered in advance in order to facilitate exam.

## 2022-04-11 NOTE — ED NOTES
TRANSFER - OUT REPORT:    Verbal report given to [employer] on Karol  being transferred to [facility] for routine progression of care       Report consisted of patients Situation, Background, Assessment and   Recommendations(SBAR). Information from the following report(s) SBAR, ED Summary, Intake/Output, MAR, Recent Results and Quality Measures was reviewed with the receiving nurse. Lines:   Peripheral IV 04/11/22 Left Antecubital (Active)        Opportunity for questions and clarification was provided.       Patient transported with:  iv

## 2022-04-11 NOTE — PROGRESS NOTES
completed the initial Spiritual Assessment of the patient, and attempted to offer Pastoral Care support to the patient but found her not very responsive at this time. There is no advance directive present. Patient does not have any Hinduism/cultural needs that will affect patients preferences in health care. Chaplains will continue to follow and will provide pastoral care on an as needed/requested basis.     Angie Wisdom  [facility] Care Department  [phone]

## 2022-04-11 NOTE — PROGRESS NOTES
TRANSFER - IN REPORT:    Verbal report received from Tiffanie Clark Rn(name) on [address]  being received from ED(unit) for routine progression of care      Report consisted of patients Situation, Background, Assessment and   Recommendations(SBAR). Information from the following report(s) SBAR, Kardex and Recent Results was reviewed with the receiving nurse. Opportunity for questions and clarification was provided. Assessment completed upon patients arrival to unit and care assumed.

## 2022-04-11 NOTE — ED TRIAGE NOTES
Pt brought in per stretcher via [facility], with c/o dizziness, sudden blurry of vision. And shaking. Pt was diagnosed with lupus last week.   As per EMS, pt's blood sugar is 144, vs within normal limit, blood pressure 162/87

## 2022-04-11 NOTE — PROGRESS NOTES
Bedside and Verbal shift change report given to Anna Raymond RN (oncoming nurse) by Sacha Griffin (offgoing nurse). Report included the following information SBAR, Kardex and Recent Results.

## 2022-04-11 NOTE — ED PROVIDER NOTES
EMERGENCY DEPARTMENT HISTORY AND PHYSICAL EXAM      Date: 4/11/2022  Patient Name: Melonie Maradiaga    History of Presenting Illness     Chief Complaint   Patient presents with    Dizziness       History Provided By: Patient    HPI: Melonie Maradiaga, 64 y.o. female presents to the ED with cc of unsteadiness. Patient reports her symptoms starts about 2 hours prior to arrival which includes an unsteadiness/dizzy feeling, worse is she stands and generalized weakness. She denies headache, nausea, or vomiting. She denies prior similar symptoms in the past. She feels like her voice is different as well but cannot describe how it is different. No trauma. No anticoagulation. Her symptoms were witnessed by her friend who called 911. On further questioning, she states she started to feel unsteady earlier this morning around 0700 but it worsened 2 hours prior to arrival.       PCP: Diana Hartman NP    No current facility-administered medications on file prior to encounter. Current Outpatient Medications on File Prior to Encounter   Medication Sig Dispense Refill    predniSONE (STERAPRED DS) 10 mg dose pack Use as directed 21 Tablet 0    methocarbamoL (Robaxin) 500 mg tablet Take 1 Tablet by mouth four (4) times daily. 16 Tablet 0    venlafaxine-SR (EFFEXOR-XR) 150 mg capsule Take 1 Capsule by mouth daily (with breakfast). 30 Capsule 0    traZODone (DESYREL) 100 mg tablet Take 1 Tablet by mouth nightly as needed for Sleep. 30 Tablet 0    QUEtiapine (SEROquel) 200 mg tablet Take 1 Tablet by mouth nightly. 30 Tablet 0    metFORMIN (GLUCOPHAGE) 500 mg tablet Take 1 Tablet by mouth two (2) times daily (with meals). 60 Tablet 0    lamoTRIgine (LaMICtal) 200 mg tablet Take 1 Tablet by mouth two (2) times a day. 60 Tablet 0    atorvastatin (LIPITOR) 40 mg tablet Take 1 Tablet by mouth daily. 30 Tablet 0    aspirin 81 mg chewable tablet Take 1 Tablet by mouth daily.  30 Tablet 0    levothyroxine (SYNTHROID) 100 mcg tablet Take 1 Tablet by mouth every morning. 30 Tablet 0    lisinopriL (PRINIVIL, ZESTRIL) 5 mg tablet Take 1 Tablet by mouth daily. Indications: high blood pressure 30 Tablet 0    albuterol (PROVENTIL HFA, VENTOLIN HFA, PROAIR HFA) 90 mcg/actuation inhaler Take 1 Puff by inhalation every four (4) hours as needed for Wheezing. 1 Inhaler 3       Past History     Past Medical History:  Past Medical History:   Diagnosis Date    Asthma, chronic, moderate persistent, with acute exacerbation     Asthmatic bronchitis without complication 40/9/6719    Bipolar 1 disorder, manic, mild ([address]     CAD (coronary artery disease)     Controlled type 2 diabetes mellitus without complication, without long-term current use of insulin (HCC)     Diabetes [facility] [address]     Hyperlipidemia     Hypertension     Hypothyroidism     Liver disease        Past Surgical History:  Past Surgical History:   Procedure Laterality Date    HX APPENDECTOMY  10/1969    HX BACK SURGERY       neck& back surgery    HX CERVICAL LAMINECTOMY      HX CYST REMOVAL Left     [facility]    HX HYSTERECTOMY Bilateral 10/1990    HX KNEE ARTHROSCOPY Left     torn cartilage x 2       Family History:  Family History   Problem Relation Age of Onset    Cancer Mother     Heart Disease Father        Social History:  Social History     Tobacco Use    Smoking status: Current Every Day Smoker     Packs/day: 1.00     Years: 50.00     Pack years: 50.00     Types: Cigarettes     Start date: 6/17/1974    Smokeless tobacco: Never Used   Substance Use Topics    Alcohol use: Never    Drug use: Not Currently     Types: Marijuana     Comment: as a teenager       Allergies:   Allergies   Allergen Reactions    Codeine Nausea and Vomiting and Nausea Only    Other Medication Other (comments)     Nicotine Patches - Serious \"burn like\" irritation on skin    Oxycodone-Acetaminophen Nausea and Vomiting, Nausea Only and Other (comments)     Pt can take tylenol  Pt can take tylenol      Carvedilol Other (comments)    Citalopram Unknown (comments) and Other (comments)     violent    Nicotine Other (comments)     Nicotine patches  Other reaction(s): toxic skin reaction  Nicotine patches      Tramadol Nausea Only and Other (comments)    Flu Vaccine Qs2014-15(36mo,Up) Nausea Only         Review of Systems   Review of Systems   Constitutional: Negative for chills and fever. HENT: Positive for voice change. Negative for sore throat and trouble swallowing. Eyes: Negative for pain and visual disturbance. Respiratory: Negative for cough and shortness of breath. Cardiovascular: Negative for chest pain and leg swelling. Gastrointestinal: Negative for abdominal pain, nausea and vomiting. Endocrine: Negative for polydipsia and polyuria. Genitourinary: Negative for dysuria and hematuria. Musculoskeletal: Negative for neck pain and neck stiffness. Skin: Negative for rash and wound. Neurological: Positive for dizziness. Negative for syncope and headaches. Physical Exam   Physical Exam  Vitals and nursing note reviewed. Constitutional:       Appearance: She is ill-appearing. HENT:      Head: Normocephalic and atraumatic. Nose: No congestion or rhinorrhea. Mouth/Throat:      Mouth: Mucous membranes are moist.      Pharynx: Oropharynx is clear. Eyes:      General: No scleral icterus. Extraocular Movements: Extraocular movements intact. Pupils: Pupils are equal, round, and reactive to light. Comments: No nystagmus   Cardiovascular:      Rate and Rhythm: Normal rate and regular rhythm. Pulses: Normal pulses. Pulmonary:      Effort: Pulmonary effort is normal.      Breath sounds: Normal breath sounds. Abdominal:      General: There is no distension. Palpations: Abdomen is soft. Musculoskeletal:         General: No tenderness. Cervical back: Neck supple. No rigidity. Right lower leg: No edema. Left lower leg: No edema.    Skin: General: Skin is warm and dry. Capillary Refill: Capillary refill takes less than 2 seconds. Neurological:      Mental Status: She is alert. Comments: She lifts her arms and legs about 2-3 cm off of the bed. If I lift her extremities, they fall to the bed. Sensation intact in all 4 extremities. Normal EOM, no nystagmus, sensation intact on her face but she does not provide a lot of effort for CN exam, appears symmetric.  When testing finger to nose and heel to shin, no obvious ataxia but she has difficulty performing the exam.         Diagnostic Study Results     Labs -     Recent Results (from the past 24 hour(s))   EKG, 12 LEAD, INITIAL    Collection Time: 04/11/22 11:10 AM   Result Value Ref Range    Ventricular Rate 87 BPM    Atrial Rate 87 BPM    P-R Interval 168 ms    QRS Duration 84 ms    Q-T Interval 376 ms    QTC Calculation (Bezet) 452 ms    Calculated P Axis 37 degrees    Calculated R Axis -40 degrees    Calculated T Axis 70 degrees    Diagnosis       Normal sinus rhythm  Left axis deviation  Minimal voltage criteria for LVH, may be normal variant  Abnormal ECG  When compared with ECG of 09-MAR-2022 12:16,  QRS axis shifted left  Confirmed by Delphine Galaviz MD, Americo Fothergill ([id number]) on 4/11/2022 5:31:37 PM     METABOLIC PANEL, BASIC    Collection Time: 04/11/22 11:17 AM   Result Value Ref Range    Sodium 139 136 - 145 mmol/L    Potassium 4.1 3.5 - 5.5 mmol/L    Chloride 105 100 - 111 mmol/L    CO2 31 21 - 32 mmol/L    Anion gap 3 3.0 - 18 mmol/L    Glucose 141 (H) 74 - 99 mg/dL    BUN 16 7.0 - 18 MG/DL    Creatinine 1.33 (H) 0.6 - 1.3 MG/DL    BUN/Creatinine ratio 12 12 - 20      GFR est AA 49 (L) >60 ml/min/1.73m2    GFR est non-AA 41 (L) >60 ml/min/1.73m2    Calcium 9.1 8.5 - 10.1 MG/DL   CBC WITH AUTOMATED DIFF    Collection Time: 04/11/22 11:17 AM   Result Value Ref Range    WBC 6.6 4.6 - 13.2 K/uL    RBC 4.75 4.20 - 5.30 M/uL    HGB 13.6 12.0 - 16.0 g/dL    HCT 41.4 35.0 - 45.0 %    MCV 87.2 78.0 - 100.0 FL    MCH 28.6 24.0 - 34.0 PG    MCHC 32.9 31.0 - 37.0 g/dL    RDW 15.3 (H) 11.6 - 14.5 %    PLATELET 688 607 - 059 K/uL    MPV 10.2 9.2 - 11.8 FL    NRBC 0.0 0  WBC    ABSOLUTE NRBC 0.00 0.00 - 0.01 K/uL    NEUTROPHILS 63 40 - 73 %    LYMPHOCYTES 27 21 - 52 %    MONOCYTES 8 3 - 10 %    EOSINOPHILS 1 0 - 5 %    BASOPHILS 1 0 - 2 %    IMMATURE GRANULOCYTES 1 (H) 0.0 - 0.5 %    ABS. NEUTROPHILS 4.1 1.8 - 8.0 K/UL    ABS. LYMPHOCYTES 1.8 0.9 - 3.6 K/UL    ABS. MONOCYTES 0.5 0.05 - 1.2 K/UL    ABS. EOSINOPHILS 0.1 0.0 - 0.4 K/UL    ABS. BASOPHILS 0.0 0.0 - 0.1 K/UL    ABS. IMM. GRANS. 0.1 (H) 0.00 - 0.04 K/UL    DF AUTOMATED     PROTHROMBIN TIME + INR    Collection Time: 04/11/22 11:17 AM   Result Value Ref Range    Prothrombin time 11.3 (L) 11.5 - 15.2 sec    INR 0.8 0.8 - 1.2     HEPATIC FUNCTION PANEL    Collection Time: 04/11/22 11:17 AM   Result Value Ref Range    Protein, total 6.9 6.4 - 8.2 g/dL    Albumin 3.5 3.4 - 5.0 g/dL    Globulin 3.4 2.0 - 4.0 g/dL    A-G Ratio 1.0 0.8 - 1.7      Bilirubin, total 0.3 0.2 - 1.0 MG/DL    Bilirubin, direct <0.1 0.0 - 0.2 MG/DL    Alk.  phosphatase 114 45 - 117 U/L    AST (SGOT) 9 (L) 10 - 38 U/L    ALT (SGPT) 20 13 - 56 U/L   PTT    Collection Time: 04/11/22 11:17 AM   Result Value Ref Range    aPTT 23.8 23.0 - 36.4 SEC   TROPONIN-HIGH SENSITIVITY    Collection Time: 04/11/22 11:17 AM   Result Value Ref Range    Troponin-High Sensitivity 6 0 - 54 ng/L   ETHYL ALCOHOL    Collection Time: 04/11/22 11:17 AM   Result Value Ref Range    ALCOHOL(ETHYL),SERUM <3 0 - 3 MG/DL   GLUCOSE, POC    Collection Time: 04/11/22 11:23 AM   Result Value Ref Range    Glucose (POC) 131 (H) 70 - 110 mg/dL   DRUG SCREEN, URINE    Collection Time: 04/11/22  1:49 PM   Result Value Ref Range    BENZODIAZEPINES Negative NEG      BARBITURATES Negative NEG      THC (TH-CANNABINOL) Negative NEG      OPIATES Negative NEG      PCP(PHENCYCLIDINE) Negative NEG      COCAINE Negative NEG AMPHETAMINES Negative NEG      METHADONE Negative NEG      HDSCOM (NOTE)    URINALYSIS W/ RFLX MICROSCOPIC    Collection Time: 04/11/22  1:49 PM   Result Value Ref Range    Color YELLOW      Appearance CLEAR      Specific gravity >1.030 (H) 1.005 - 1.030    pH (UA) 6.0 5.0 - 8.0      Protein Negative NEG mg/dL    Glucose Negative NEG mg/dL    Ketone Negative NEG mg/dL    Bilirubin Negative NEG      Blood Negative NEG      Urobilinogen 0.2 0.2 - 1.0 EU/dL    Nitrites Negative NEG      Leukocyte Esterase MODERATE (A) NEG     URINE MICROSCOPIC ONLY    Collection Time: 04/11/22  1:49 PM   Result Value Ref Range    WBC 4 to 8 0 - 4 /hpf    Epithelial cells 1+ 0 - 5 /lpf    Bacteria 1+ (A) NEG /hpf   GLUCOSE, POC    Collection Time: 04/11/22  3:38 PM   Result Value Ref Range    Glucose (POC) 105 70 - 110 mg/dL   GLUCOSE, POC    Collection Time: 04/11/22  9:06 PM   Result Value Ref Range    Glucose (POC) 140 (H) 70 - 110 mg/dL       Radiologic Studies -   CTA HEAD NECK W CONT   Final Result   1. Patent intracranial brain arteries. No LVO. 2. No significant stenosis in cervical carotid or vertebral arteries. Prelim to Dr. Saul Bobo 12:17 pm 4/11/22            CT HEAD WO CONT   Final Result      No acute findings, though MR is more sensitive for acute stroke evaluation. Results discussed with Dr. Saul Bobo on 4/11/2022 at 1159 hours. MRI BRAIN WO CONT    (Results Pending)     CT Results  (Last 48 hours)               04/11/22 1206  CTA HEAD NECK W CONT Final result    Impression:  1. Patent intracranial brain arteries. No LVO. 2. No significant stenosis in cervical carotid or vertebral arteries. Prelim to Dr. Saul Bobo 12:17 pm 4/11/22               Narrative:  EXAM: CTA HEAD NECK W CONT       CLINICAL INDICATIONS: r/o cva, LVO. CODE S acute stroke suspected. Sudden onset   blurred vision, recent diagnosis of SLE.        TECHNIQUE: Helical CT scan of the brain and neck were performed at 1.25 mm   intervals during rapid IV bolus contrast administration. These data were   reconstructed at .625 mm intervals for vascular analysis. The data was also   reviewed at 2.5 mm intervals for accompanying soft tissue analysis. 3D post   processed images, including surface shaded displays, were produced for this exam   on independent console, permanently archived and interpreted. All CT scans at this facility are performed using dose optimization technique as   appropriate to a performed exam, to include automated exposure control,   adjustment of the MA and/or kV according to patient size (including appropriate   matching for site-specific examinations) or use of  iterative reconstruction   technique. CONTRAST: Isovue 370       COMPARISON: No prior CTA. Head CT earlier same day 4/11/2022, 10/28/2009. Brain   MRI 1/6/2007       CTA SOFT TISSUE ANALYSIS:   Lungs: Mild paraseptal emphysema at the lung apices. Upper chest: There may be some secretions in the distal trachea. Neck: No neck mass. Lymph nodes: No adenopathy   Orbits: Grossly normal for non-dedicated exam.   Paranasal sinuses: No significant paranasal sinus disease. Brain: No hemorrhage or mass effect. Bones: No acute findings. Post surgical changes from previous cervical ACDF       CTA NECK VASCULAR ANALYSIS:        Aortic arch: No stenosis or aneurysm. Innominate: Patent   Right Subclavian: Patent   Left Subclavian: Patent       Right carotid:   -CCA: Patent   -ECA: Patent   -ICA: I will plaque, no significant stenosis. 0% stenosis of proximal ICA by   NASCET criteria. Left carotid:   -CCA: Patent   -ECA: Patent   -ICA: Mild plaque, no stenosis. 0% stenosis of proximal ICA by NASCET criteria. Right vertebral: Patent. No stenosis. Left vertebral: Patent. No stenosis.            CTA BRAIN VASCULAR ANALYSIS:        Right anterior circulation:   -ICA: Patent   -KIERRA: Patent    -MCA: Patent       Left anterior circulation:   -ICA: Patent   -KIERRA: Patent    -MCA: The inferior MCA branch is dominant, patent. Smaller superior division is   patent, no focal stenosis. -ACOM: Unremarkable       Posterior circulation:   -RVA: Patent   -LVA: Patent   -Basilar: Patent   -Right PCA: Patent   -Left PCA: Patent       Major dural venous sinuses: Unremarkable               04/11/22 1155  CT HEAD WO CONT Final result    Impression:      No acute findings, though MR is more sensitive for acute stroke evaluation. Results discussed with Dr. María Elena Grant on 4/11/2022 at 1159 hours. Narrative:  CT HEAD WO CONT       History: Dizziness, acute onset blurry vision, lupus, tremors, hypertension. Comparison: 10/28/2009       TECHNIQUE: 5 mm helical scan obtained of the head were obtained from the skull   vertex through the base of the skull without intravenous contrast.         All CT scans at this facility are performed using dose optimization technique as   appropriate to a performed exam, to include automated exposure control,   adjustment of the mA and/or kV according to patient size (including appropriate   matching first site-specific examinations), or use of iterative reconstruction   technique. BRAIN RESULT:         Acute change:   No evidence of an acute infarct or other acute parenchymal   process. Hemorrhage:    No evidence of acute intracranial hemorrhage. Mass Effect / Mass Lesion:    There is no evidence of an intracranial mass or   extraaxial fluid collection. No significant mass effect. Chronic change:    None apparent. Parenchyma: There is no significant volume loss. The brain parenchyma is   otherwise within normal limits for age. Ventricles: The ventricles are within normal limits of size and   configuration for age. Other:  The visualized paranasal sinuses are grossly clear.   The skull and   visualized extracranial soft tissues are grossly normal.                     CXR Results  (Last 48 hours)    None          Medical Decision Making   I am the first provider for this patient. I reviewed the vital signs, available nursing notes, past medical history, past surgical history, family history and social history. Vital Signs-Reviewed the patient's vital signs. Patient Vitals for the past 24 hrs:   Temp Pulse Resp BP SpO2   04/11/22 1956 98.3 °F (36.8 °C) 73 19 (!) 141/65 93 %   04/11/22 1436 98 °F (36.7 °C) 75 20 (!) 142/80 --   04/11/22 1345 98.7 °F (37.1 °C) 77 20 136/65 --   04/11/22 1330 -- 81 18 (!) 146/68 --   04/11/22 1315 -- 76 22 (!) 142/69 --   04/11/22 1300 -- 73 -- (!) 138/57 93 %   04/11/22 1230 -- 79 -- 133/77 95 %   04/11/22 1215 -- 79 -- 136/62 93 %   04/11/22 1130 -- 85 25 (!) 155/78 92 %   04/11/22 1127 99 °F (37.2 °C) 86 22 (!) 155/75 93 %         Records Reviewed: Nursing Notes, Old Medical Records and Previous Laboratory Studies    Provider Notes (Medical Decision Making)/ED course:   19-year-old with recent diagnosis of lupus (was on steroids, stopped on Friday), HTN, TIA, ADHD, schizophrenia, depression, hypercholesterolemia presents with dizziness/unsteadiness, generalized weakness. Last known normal was 0700 but symptoms acutely worsened at 0930. 1115 Initial assessment performed. The patients presenting problems have been discussed, and they are in agreement with the care plan formulated and outlined with them. I have encouraged them to ask questions as they arise throughout their visit. Differential includes intracranial hemorrhage, ischemic CVA, infection (PNA, UTI, intra-abdominal infection), electrolytes disturbance, renal failure, lupus exacerbation, thyroid disease, dissection, ACS, CHF, myositis, GB, MG, transverse myelitis. EKG, labs, and imaging personally interpreted by myself and Dr. Albert Castillo as:    EKG interpretation: (Preliminary)  Rhythm: normal sinus rhythm; and regular .  Rate (approx.): 87; Axis: left axis deviation; DE interval: normal; QRS interval: normal ; ST/T wave: normal; Other findings: appears similar to prior. 1123 - POC glucose 131    Labs: LISA, creatinine 1.33 up from 0.94 on 3/9/22 but priors appears yo be 0.94-1.4. Normal electrolytes. No anemia, no leukocytosis, normal platelets. Normal AST/ALT, normal troponin. Imaging: No acute findings on CT head noncon. 1239 - Discussed case with Dr. Luis E Bhakta, he sees a L basal galgia lesion, likely DVA. He recommends admission for MRI brain, antiphospholip/hypercoag work-up, recommends permissive HTN, low dose benzo for dizziness, total dose of 325 mg ASA today (took 81 mg this morning) and if no clear etiology, EEG.     1258 - Discussed case with Dr. Mic Zavala, who agrees to assume care of the patient and admit to observation. She would like a formal neurology consult. 1330 - Discussed case with Dr. Higinio Zurita. He states that she has had similar symptoms in the past and has been seen at his office and has had MRI in the past. He will evaluate the patient. Corinne Rank, MD      Disposition:  Admitted      Diagnosis     Clinical Impression:   1. Dizziness    2. Chronic kidney disease, stage 3 (moderate)    3. Diet-controlled type 2 diabetes mellitus ([facility] [address]    4. Hypertension, unspecified type        Attestations:    Corinne Rank, MD    Please note that this dictation was completed with [employer], the computer voice recognition software. Quite often unanticipated grammatical, syntax, homophones, and other interpretive errors are inadvertently transcribed by the computer software. Please disregard these errors. Please excuse any errors that have escaped final proofreading. Thank you.

## 2022-04-11 NOTE — PROGRESS NOTES
MRI Safety Screening form needs to be filled out and FAXED to [phone] BEFORE MRI can be scheduled. If unable to acquire information from patient, [facility] must be contacted, or screening xrays will need to be ordred.     If pt is Claustrophobic or needs Pain Meds, please have these ordered in advance to help facilitate MRI exam.

## 2022-04-11 NOTE — CONSULTS
A 61years old female patient with past medical history of asthma, hypertension, hyperlipidemia, type II DM, and bipolar disorder was brought to the emergency room for an acute onset dizziness and balance difficulty. She said, she woke up fine this morning and at around 9 developed sudden dizziness [lightheadedness] and generalized weakness; she stumbled but did not fall. No changes in her mental status. Denied having any associated palpitation, sweating, or about to pass out feeling. No spinning sensation. No associated nausea or vomiting. When in bed, she is able to move her extremities. She is speaking in low tone; otherwise no significant changes in her speech. No fever, chills, chest pain, shortness of breath. She had a CT scan of the brain which did not show any acute changes. CT angiography of the head and neck was unremarkable. UDS is negative. Metabolic panels are unremarkable except for slight elevated creatinine from her previous levels. Patient wasseen at the neurology clinic in November 2020 for gait difficulty and falls; MRI of the lumbosacral spine has shown significant spinal canal stenosis. Subsequently, was seen at [facility]; no surgeries done.     Social History     Socioeconomic History    Marital status:      Spouse name: Not on file    Number of children: Not on file    Years of education: Not on file    Highest education level: Not on file   Occupational History    Not on file   Tobacco Use    Smoking status: Current Every Day Smoker     Packs/day: 1.00     Years: 50.00     Pack years: 50.00     Types: Cigarettes     Start date: 6/17/1974    Smokeless tobacco: Never Used   Substance and Sexual Activity    Alcohol use: Never    Drug use: Not Currently     Types: Marijuana     Comment: as a teenager    Sexual activity: Not on file   Other Topics Concern    Not on file   Social History Narrative    Not on file     Social Determinants of Health     Financial Resource Strain:     Difficulty of Paying Living Expenses: Not on file   Food Insecurity:     Worried About Running Out of Food in the Last Year: Not on file    Kamran of Food in the Last Year: Not on file   Transportation Needs:     Lack of Transportation (Medical): Not on file    Lack of Transportation (Non-Medical):  Not on file   Physical Activity:     Days of Exercise per Week: Not on file    Minutes of Exercise per Session: Not on file   Stress:     Feeling of Stress : Not on file   Social Connections:     Frequency of Communication with Friends and Family: Not on file    Frequency of Social Gatherings with Friends and Family: Not on file    Attends Protestant Services: Not on file    Active Member of [address] [place] [address] [employer] or Organizations: Not on file    Attends Club or Organization Meetings: Not on file    Marital Status: Not on file   Intimate Partner Violence:     Fear of Current or Ex-Partner: Not on file    Emotionally Abused: Not on file    Physically Abused: Not on file    Sexually Abused: Not on file   Housing Stability:     Unable to Pay for Housing in the Last Year: Not on file    Number of [place] in the Last Year: Not on file    Unstable Housing in the Last Year: Not on file       Family History   Problem Relation Age of Onset    Cancer Mother     Heart Disease Father         Current Facility-Administered Medications   Medication Dose Route Frequency Provider Last Rate Last Admin    0.9% sodium chloride infusion  100 mL/hr IntraVENous CONTINUOUS Karl Joel  mL/hr at 04/11/22 1134 100 mL/hr at 04/11/22 1134    [START ON 4/12/2022] aspirin chewable tablet 243 mg  243 mg Oral DAILY Karl Joel MD           Past Medical History:   Diagnosis Date    Asthma, chronic, moderate persistent, with acute exacerbation     Asthmatic bronchitis without complication 74/3/7888    [facility], [facility]     CAD (coronary artery disease)     Controlled type 2 diabetes mellitus without complication, without long-term current use of insulin (HCC)     Diabetes ([address]     Hyperlipidemia     Hypertension     Hypothyroidism     Liver disease        Past Surgical History:   Procedure Laterality Date    HX APPENDECTOMY  10/1969    HX BACK SURGERY       neck& back surgery    HX CERVICAL LAMINECTOMY      HX CYST REMOVAL Left     Baker's    HX HYSTERECTOMY Bilateral 10/1990    HX KNEE ARTHROSCOPY Left     torn cartilage x 2       Allergies   Allergen Reactions    Codeine Nausea and Vomiting and Nausea Only    Other Medication Other (comments)     Nicotine Patches - Serious \"burn like\" irritation on skin    Oxycodone-Acetaminophen Nausea and Vomiting, Nausea Only and Other (comments)     Pt can take tylenol  Pt can take tylenol      Carvedilol Other (comments)    Citalopram Unknown (comments) and Other (comments)     violent    Nicotine Other (comments)     Nicotine patches  Other reaction(s): toxic skin reaction  Nicotine patches      Tramadol Nausea Only and Other (comments)    Flu Vaccine [id number](36mo,Up) Nausea Only       Patient Active Problem List   Diagnosis Code    Severe obesity ([facility]) E66.01    Chronic kidney disease, stage 3 (moderate) N18.30    Diet-controlled type 2 diabetes mellitus ([facility] E11.9    DM (diabetes mellitus) ([facility] E11.9    Dry eyes H04.123    Glaucoma suspect H40.009    Hypertension I10    Hypothyroidism E03.9    Smoker F17.200    Tobacco use Z72.0    B12 deficiency E53.8    Dislocation, finger, initial encounter S63.259A    Frequent falls R29.6    Laceration of little finger without foreign body without damage to nail S61.218A    Asthmatic bronchitis without complication N57.338    Severe depressed bipolar I disorder without psychotic features ([address] F31.4    Dizziness R42         Review of Systems:   Constitutional no fever or chills  Skin denies rash or itching  HENT  Denies tinnitus, hearing lose  Eyes denies diplopia vision lose  Respiratory denies shortness of breath  Cardiovascular denies chest pain, dyspnea on exertion  Gastrointestinal denies nausea, vomiting  Genitourinary: no incontinence. Musculoskeletal: has knee  joint and low back pain  Hematology: has easy bruisability. Neurological as above in HPI      PHYSICAL EXAMINATION:      VITAL SIGNS:    Visit Vitals  BP (!) (P) 142/80 (BP 1 Location: Right upper arm)   Pulse (P) 75   Temp (P) 98 °F (36.7 °C)   Resp (P) 20   SpO2 93%       GENERAL: In no apparent distress. EXTREMITIES: Muscle tone is normal.  HEAD:   The patient is normocephalic. NEUROLOGIC EXAMINATION  Mental status: Awake, alert, oriented x3, follows simple and complex commands, no extinction; no gaze deviation. Speech and languge: slow and speaks in low tone; otherwise  coherent and comprehension intact  CN: VFF, EOMI, PERRLA, no facial asymmetry noted, palate elevation symmetric bilat, SS+SCM 5/5 bilat, tongue midline  Motor: able to lift UE UEs but feels tired to keep them Up; poor effort in motor exam; but moves arms and legs symmetrically. Sensory: Decreased light touch and pinprick over the right upper and lower extremities. Coordination: FNF  accurate w/o dysmetria  DTR: 2+ over the upper extremities; 3+ over the lower extremities. Gait: not tested     Study Result    Narrative & Impression   EXAM: CTA HEAD NECK W CONT     CLINICAL INDICATIONS: r/o cva, LVO CODE S stroke alert     PRELIMINARY REPORT:  Patent cervical carotid and vertebral arteries without significant stenosis.     Patent intracranial circulation. No large vessel occlusion.      Study Result    Narrative & Impression   CT HEAD WO CONT     History: Dizziness, acute onset blurry vision, lupus, tremors, hypertension.        Comparison: 10/28/2009     TECHNIQUE: 5 mm helical scan obtained of the head were obtained from the skull  vertex through the base of the skull without intravenous contrast.       All CT scans at this facility are performed using dose optimization technique as  appropriate to a performed exam, to include automated exposure control,  adjustment of the mA and/or kV according to patient size (including appropriate  matching first site-specific examinations), or use of iterative reconstruction  technique.     BRAIN RESULT:       Acute change:   No evidence of an acute infarct or other acute parenchymal  process. Hemorrhage:    No evidence of acute intracranial hemorrhage.     Mass Effect / Mass Lesion:    There is no evidence of an intracranial mass or  extraaxial fluid collection. No significant mass effect. Chronic change:    None apparent.     Parenchyma: There is no significant volume loss. The brain parenchyma is  otherwise within normal limits for age.     Ventricles: The ventricles are within normal limits of size and  configuration for age.     Other:  The visualized paranasal sinuses are grossly clear.   The skull and  visualized extracranial soft tissues are grossly normal.          IMPRESSION     No acute findings, though MR is more sensitive for acute stroke evaluation.               CBC:   Lab Results   Component Value Date/Time    WBC 6.6 04/11/2022 11:17 AM    RBC 4.75 04/11/2022 11:17 AM    HGB 13.6 04/11/2022 11:17 AM    HCT 41.4 04/11/2022 11:17 AM    PLATELET 082 52/83/9822 11:17 AM     BMP:   Lab Results   Component Value Date/Time    Glucose 141 (H) 04/11/2022 11:17 AM    Sodium 139 04/11/2022 11:17 AM    Potassium 4.1 04/11/2022 11:17 AM    Chloride 105 04/11/2022 11:17 AM    CO2 31 04/11/2022 11:17 AM    BUN 16 04/11/2022 11:17 AM    Creatinine 1.33 (H) 04/11/2022 11:17 AM    Calcium 9.1 04/11/2022 11:17 AM     CMP:   Lab Results   Component Value Date/Time    Glucose 141 (H) 04/11/2022 11:17 AM    Sodium 139 04/11/2022 11:17 AM    Potassium 4.1 04/11/2022 11:17 AM    Chloride 105 04/11/2022 11:17 AM    CO2 31 04/11/2022 11:17 AM    BUN 16 04/11/2022 11:17 AM    Creatinine 1.33 (H) 04/11/2022 11:17 AM    Calcium 9.1 04/11/2022 11:17 AM    Anion gap 3 04/11/2022 11:17 AM    BUN/Creatinine ratio 12 04/11/2022 11:17 AM    Alk. phosphatase 114 04/11/2022 11:17 AM    Protein, total 6.9 04/11/2022 11:17 AM    Albumin 3.5 04/11/2022 11:17 AM    Globulin 3.4 04/11/2022 11:17 AM    A-G Ratio 1.0 04/11/2022 11:17 AM     Coagulation:   Lab Results   Component Value Date/Time    Prothrombin time 11.3 (L) 04/11/2022 11:17 AM    INR 0.8 04/11/2022 11:17 AM    aPTT 23.8 04/11/2022 11:17 AM     Cardiac markers:   Lab Results   Component Value Date/Time    CK 92 08/14/2010 07:56 PM    CK-MB Index CALCULATION NOT PERFORMED WHEN MB IS <0.5 08/14/2010 07:56 PM     ABGs: No results found for: PH, PO2, PCO2, HCO3, BD, BE       Impression/plan:   A 64years old female patient with above medical problems was brought to the emergency room for an acute onset generalized weakness and dizziness/lightheadedness. Exam is nonfocal but she said has decreased light touch of the right upper and lower extremity. CT scan of the brain and CT angiography of the head and neck are unremarkable. Metabolic panels are grossly unremarkable other than new elevated creatinine. Patient has no fever or leukocytosis. MRI of the brain is pending. Generalized weakness could be from other medical reasons or medications. Could also be from underlying psychiatric disorders. But, acute dizziness could also be from posterior circulation stroke. We will follow the MRI. Work-up for underlying metabolic reasons or infection as per the primary team.  We will follow patient. Call for questions. PLEASE NOTE:   This document has been produced using voice recognition software. Unrecognized errors in transcription may be present.

## 2022-04-12 ENCOUNTER — APPOINTMENT (OUTPATIENT)
Dept: NON INVASIVE DIAGNOSTICS | Age: 62
End: 2022-04-12
Attending: INTERNAL MEDICINE
Payer: MEDICARE

## 2022-04-12 PROBLEM — F31.9 BIPOLAR 1 DISORDER (HCC): Status: ACTIVE | Noted: 2022-04-12

## 2022-04-12 LAB
CHOLEST SERPL-MCNC: 180 MG/DL
ECHO AO ROOT DIAM: 2.9 CM
ECHO AO ROOT INDEX: 1.6 CM/M2
ECHO LA VOL 2C: 55 ML (ref 22–52)
ECHO LA VOL 4C: 78 ML (ref 22–52)
ECHO LA VOLUME AREA LENGTH: 71 ML
ECHO LA VOLUME INDEX A2C: 30 ML/M2 (ref 16–34)
ECHO LA VOLUME INDEX A4C: 43 ML/M2 (ref 16–34)
ECHO LA VOLUME INDEX AREA LENGTH: 39 ML/M2 (ref 16–34)
ECHO LV E' LATERAL VELOCITY: 9 CM/S
ECHO LV E' SEPTAL VELOCITY: 7 CM/S
ECHO LV FRACTIONAL SHORTENING: 35 % (ref 28–44)
ECHO LV INTERNAL DIMENSION DIASTOLE INDEX: 2.82 CM/M2
ECHO LV INTERNAL DIMENSION DIASTOLIC: 5.1 CM (ref 3.9–5.3)
ECHO LV INTERNAL DIMENSION SYSTOLIC INDEX: 1.82 CM/M2
ECHO LV INTERNAL DIMENSION SYSTOLIC: 3.3 CM
ECHO LV IVSD: 1.3 CM (ref 0.6–0.9)
ECHO LV MASS 2D: 270.1 G (ref 67–162)
ECHO LV MASS INDEX 2D: 149.2 G/M2 (ref 43–95)
ECHO LV POSTERIOR WALL DIASTOLIC: 1.3 CM (ref 0.6–0.9)
ECHO LV RELATIVE WALL THICKNESS RATIO: 0.51
ECHO LVOT AREA: 3.1 CM2
ECHO LVOT DIAM: 2 CM
ECHO LVOT MEAN GRADIENT: 2 MMHG
ECHO LVOT PEAK GRADIENT: 3 MMHG
ECHO LVOT PEAK VELOCITY: 0.9 M/S
ECHO LVOT STROKE VOLUME INDEX: 36.1 ML/M2
ECHO LVOT SV: 65.3 ML
ECHO LVOT VTI: 20.8 CM
ECHO MV A VELOCITY: 0.62 M/S
ECHO MV E DECELERATION TIME (DT): 137 MS
ECHO MV E VELOCITY: 0.9 M/S
ECHO MV E/A RATIO: 1.45
ECHO MV E/E' LATERAL: 10
ECHO MV E/E' RATIO (AVERAGED): 11.43
ECHO MV E/E' SEPTAL: 12.86
ECHO MV EROA CONT EQ: 0.2 CM2
ECHO MV REGURGITANT ALIASING (NYQUIST) VELOCITY: 38 CM/S
ECHO MV REGURGITANT VELOCITY PISA: 5.8 M/S
ECHO MV REGURGITANT VTIA: 208.3 CM
ECHO RV FREE WALL PEAK S': 8 CM/S
ECHO RV TAPSE: 2.8 CM (ref 1.7–?)
ECHO TV REGURGITANT MAX VELOCITY: 2.61 M/S
ECHO TV REGURGITANT PEAK GRADIENT: 27 MMHG
EST. AVERAGE GLUCOSE BLD GHB EST-MCNC: 148 MG/DL
GLUCOSE BLD STRIP.AUTO-MCNC: 112 MG/DL (ref 70–110)
GLUCOSE BLD STRIP.AUTO-MCNC: 119 MG/DL (ref 70–110)
GLUCOSE BLD STRIP.AUTO-MCNC: 120 MG/DL (ref 70–110)
GLUCOSE BLD STRIP.AUTO-MCNC: 132 MG/DL (ref 70–110)
HBA1C MFR BLD: 6.8 % (ref 4.2–5.6)
HDLC SERPL-MCNC: 58 MG/DL (ref 40–60)
HDLC SERPL: 3.1 {RATIO} (ref 0–5)
LDLC SERPL CALC-MCNC: 76.4 MG/DL (ref 0–100)
LIPID PROFILE,FLP: ABNORMAL
MAGNESIUM SERPL-MCNC: 2.5 MG/DL (ref 1.6–2.6)
T4 FREE SERPL-MCNC: 1.1 NG/DL (ref 0.7–1.5)
TRIGL SERPL-MCNC: 228 MG/DL (ref ?–150)
TSH SERPL DL<=0.05 MIU/L-ACNC: 7.08 UIU/ML (ref 0.36–3.74)
VLDLC SERPL CALC-MCNC: 45.6 MG/DL

## 2022-04-12 PROCEDURE — 74011250637 HC RX REV CODE- 250/637: Performed by: PHYSICIAN ASSISTANT

## 2022-04-12 PROCEDURE — 74011250636 HC RX REV CODE- 250/636: Performed by: INTERNAL MEDICINE

## 2022-04-12 PROCEDURE — 82962 GLUCOSE BLOOD TEST: CPT

## 2022-04-12 PROCEDURE — 84443 ASSAY THYROID STIM HORMONE: CPT

## 2022-04-12 PROCEDURE — 99215 OFFICE O/P EST HI 40 MIN: CPT | Performed by: STUDENT IN AN ORGANIZED HEALTH CARE EDUCATION/TRAINING PROGRAM

## 2022-04-12 PROCEDURE — 83735 ASSAY OF MAGNESIUM: CPT

## 2022-04-12 PROCEDURE — 74011000250 HC RX REV CODE- 250: Performed by: EMERGENCY MEDICINE

## 2022-04-12 PROCEDURE — G0378 HOSPITAL OBSERVATION PER HR: HCPCS

## 2022-04-12 PROCEDURE — 99224 PR SBSQ OBSERVATION CARE/DAY 15 MINUTES: CPT | Performed by: EMERGENCY MEDICINE

## 2022-04-12 PROCEDURE — 77030018842 HC SOL IRR SOD CL 9% BAXT -A

## 2022-04-12 PROCEDURE — 36415 COLL VENOUS BLD VENIPUNCTURE: CPT

## 2022-04-12 PROCEDURE — APPSS45 APP SPLIT SHARED TIME 31-45 MINUTES: Performed by: PHYSICIAN ASSISTANT

## 2022-04-12 PROCEDURE — 96372 THER/PROPH/DIAG INJ SC/IM: CPT

## 2022-04-12 PROCEDURE — 74011250637 HC RX REV CODE- 250/637: Performed by: INTERNAL MEDICINE

## 2022-04-12 PROCEDURE — 83036 HEMOGLOBIN GLYCOSYLATED A1C: CPT

## 2022-04-12 PROCEDURE — 80061 LIPID PANEL: CPT

## 2022-04-12 PROCEDURE — 93306 TTE W/DOPPLER COMPLETE: CPT

## 2022-04-12 PROCEDURE — 84439 ASSAY OF FREE THYROXINE: CPT

## 2022-04-12 PROCEDURE — 74011250636 HC RX REV CODE- 250/636: Performed by: STUDENT IN AN ORGANIZED HEALTH CARE EDUCATION/TRAINING PROGRAM

## 2022-04-12 PROCEDURE — 2709999900 HC NON-CHARGEABLE SUPPLY

## 2022-04-12 RX ORDER — QUETIAPINE FUMARATE 100 MG/1
100 TABLET, FILM COATED ORAL
COMMUNITY
Start: 2022-03-19

## 2022-04-12 RX ORDER — LEVOTHYROXINE SODIUM 125 UG/1
125 TABLET ORAL DAILY
COMMUNITY
Start: 2022-03-30

## 2022-04-12 RX ORDER — QUETIAPINE FUMARATE 200 MG/1
200 TABLET, FILM COATED ORAL
Status: DISCONTINUED | OUTPATIENT
Start: 2022-04-12 | End: 2022-04-12

## 2022-04-12 RX ORDER — MECLIZINE HCL 12.5 MG 12.5 MG/1
12.5 TABLET ORAL
COMMUNITY
End: 2022-05-23 | Stop reason: ALTCHOICE

## 2022-04-12 RX ORDER — LISINOPRIL 5 MG/1
5 TABLET ORAL DAILY
Status: DISCONTINUED | OUTPATIENT
Start: 2022-04-13 | End: 2022-04-13 | Stop reason: HOSPADM

## 2022-04-12 RX ORDER — TRAZODONE HYDROCHLORIDE 100 MG/1
100 TABLET ORAL
Status: DISCONTINUED | OUTPATIENT
Start: 2022-04-12 | End: 2022-04-13 | Stop reason: HOSPADM

## 2022-04-12 RX ORDER — POLYETHYLENE GLYCOL 3350 17 G/17G
17 POWDER, FOR SOLUTION ORAL DAILY PRN
Status: DISCONTINUED | OUTPATIENT
Start: 2022-04-12 | End: 2022-04-13 | Stop reason: HOSPADM

## 2022-04-12 RX ORDER — LABETALOL HCL 20 MG/4 ML
5 SYRINGE (ML) INTRAVENOUS
Status: DISCONTINUED | OUTPATIENT
Start: 2022-04-12 | End: 2022-04-13 | Stop reason: HOSPADM

## 2022-04-12 RX ORDER — SODIUM CHLORIDE 9 MG/ML
10 INJECTION INTRAMUSCULAR; INTRAVENOUS; SUBCUTANEOUS
Status: COMPLETED | OUTPATIENT
Start: 2022-04-12 | End: 2022-04-12

## 2022-04-12 RX ORDER — ACETAMINOPHEN 325 MG/1
650 TABLET ORAL
Status: DISCONTINUED | OUTPATIENT
Start: 2022-04-12 | End: 2022-04-13 | Stop reason: HOSPADM

## 2022-04-12 RX ORDER — ROSUVASTATIN CALCIUM 20 MG/1
TABLET, COATED ORAL
COMMUNITY
Start: 2022-01-24 | End: 2022-05-23 | Stop reason: ALTCHOICE

## 2022-04-12 RX ORDER — BUDESONIDE AND FORMOTEROL FUMARATE DIHYDRATE 160; 4.5 UG/1; UG/1
1 AEROSOL RESPIRATORY (INHALATION) DAILY
COMMUNITY
Start: 2022-03-31

## 2022-04-12 RX ORDER — ATORVASTATIN CALCIUM 40 MG/1
40 TABLET, FILM COATED ORAL DAILY
Status: DISCONTINUED | OUTPATIENT
Start: 2022-04-12 | End: 2022-04-12 | Stop reason: SDUPTHER

## 2022-04-12 RX ORDER — HEPARIN SODIUM 5000 [USP'U]/ML
5000 INJECTION, SOLUTION INTRAVENOUS; SUBCUTANEOUS EVERY 8 HOURS
Status: DISCONTINUED | OUTPATIENT
Start: 2022-04-12 | End: 2022-04-13 | Stop reason: HOSPADM

## 2022-04-12 RX ORDER — SODIUM CHLORIDE 9 MG/ML
75 INJECTION, SOLUTION INTRAVENOUS CONTINUOUS
Status: DISPENSED | OUTPATIENT
Start: 2022-04-12 | End: 2022-04-13

## 2022-04-12 RX ORDER — FAMOTIDINE 20 MG/1
20 TABLET, FILM COATED ORAL DAILY
Status: DISCONTINUED | OUTPATIENT
Start: 2022-04-12 | End: 2022-04-13

## 2022-04-12 RX ORDER — MAGNESIUM SULFATE 100 %
4 CRYSTALS MISCELLANEOUS AS NEEDED
Status: DISCONTINUED | OUTPATIENT
Start: 2022-04-12 | End: 2022-04-13 | Stop reason: HOSPADM

## 2022-04-12 RX ORDER — LEVOTHYROXINE SODIUM 100 UG/1
100 TABLET ORAL
Status: DISCONTINUED | OUTPATIENT
Start: 2022-04-12 | End: 2022-04-12

## 2022-04-12 RX ORDER — SODIUM CHLORIDE 9 MG/ML
10 INJECTION INTRAMUSCULAR; INTRAVENOUS; SUBCUTANEOUS
Status: DISCONTINUED | OUTPATIENT
Start: 2022-04-12 | End: 2022-04-12 | Stop reason: SDUPTHER

## 2022-04-12 RX ORDER — INSULIN LISPRO 100 [IU]/ML
INJECTION, SOLUTION INTRAVENOUS; SUBCUTANEOUS
Status: DISCONTINUED | OUTPATIENT
Start: 2022-04-12 | End: 2022-04-13 | Stop reason: HOSPADM

## 2022-04-12 RX ORDER — DEXTROSE MONOHYDRATE 100 MG/ML
0-250 INJECTION, SOLUTION INTRAVENOUS AS NEEDED
Status: DISCONTINUED | OUTPATIENT
Start: 2022-04-12 | End: 2022-04-13 | Stop reason: HOSPADM

## 2022-04-12 RX ORDER — LAMOTRIGINE 100 MG/1
200 TABLET ORAL 2 TIMES DAILY
Status: DISCONTINUED | OUTPATIENT
Start: 2022-04-12 | End: 2022-04-13 | Stop reason: HOSPADM

## 2022-04-12 RX ORDER — EVOLOCUMAB 140 MG/ML
140 INJECTION, SOLUTION SUBCUTANEOUS
COMMUNITY
Start: 2022-04-06 | End: 2022-05-18 | Stop reason: CLARIF

## 2022-04-12 RX ORDER — GUAIFENESIN 100 MG/5ML
81 LIQUID (ML) ORAL DAILY
Status: DISCONTINUED | OUTPATIENT
Start: 2022-04-12 | End: 2022-04-13 | Stop reason: HOSPADM

## 2022-04-12 RX ORDER — VENLAFAXINE HYDROCHLORIDE 75 MG/1
150 CAPSULE, EXTENDED RELEASE ORAL
Status: DISCONTINUED | OUTPATIENT
Start: 2022-04-12 | End: 2022-04-13 | Stop reason: HOSPADM

## 2022-04-12 RX ORDER — QUETIAPINE FUMARATE 100 MG/1
100 TABLET, FILM COATED ORAL
Status: DISCONTINUED | OUTPATIENT
Start: 2022-04-12 | End: 2022-04-13 | Stop reason: HOSPADM

## 2022-04-12 RX ORDER — ATORVASTATIN CALCIUM 40 MG/1
80 TABLET, FILM COATED ORAL
Status: DISCONTINUED | OUTPATIENT
Start: 2022-04-12 | End: 2022-04-13 | Stop reason: HOSPADM

## 2022-04-12 RX ADMIN — SODIUM CHLORIDE 100 ML/HR: 900 INJECTION, SOLUTION INTRAVENOUS at 07:56

## 2022-04-12 RX ADMIN — LAMOTRIGINE 200 MG: 100 TABLET ORAL at 17:42

## 2022-04-12 RX ADMIN — LEVOTHYROXINE SODIUM 100 MCG: 100 TABLET ORAL at 09:22

## 2022-04-12 RX ADMIN — SODIUM CHLORIDE 10 ML: 9 INJECTION INTRAMUSCULAR; INTRAVENOUS; SUBCUTANEOUS at 14:48

## 2022-04-12 RX ADMIN — QUETIAPINE FUMARATE 100 MG: 100 TABLET ORAL at 23:11

## 2022-04-12 RX ADMIN — HEPARIN SODIUM 5000 UNITS: 5000 INJECTION INTRAVENOUS; SUBCUTANEOUS at 17:43

## 2022-04-12 RX ADMIN — ATORVASTATIN CALCIUM 80 MG: 40 TABLET, FILM COATED ORAL at 23:11

## 2022-04-12 RX ADMIN — FAMOTIDINE 20 MG: 20 TABLET ORAL at 12:09

## 2022-04-12 RX ADMIN — HEPARIN SODIUM 5000 UNITS: 5000 INJECTION INTRAVENOUS; SUBCUTANEOUS at 09:22

## 2022-04-12 RX ADMIN — VENLAFAXINE HYDROCHLORIDE 150 MG: 75 CAPSULE, EXTENDED RELEASE ORAL at 09:22

## 2022-04-12 RX ADMIN — LAMOTRIGINE 200 MG: 100 TABLET ORAL at 12:09

## 2022-04-12 RX ADMIN — SODIUM CHLORIDE 75 ML/HR: 900 INJECTION, SOLUTION INTRAVENOUS at 17:44

## 2022-04-12 RX ADMIN — ASPIRIN 81 MG 81 MG: 81 TABLET ORAL at 09:22

## 2022-04-12 NOTE — PROGRESS NOTES
Reason for Renal Dosing:  Per Renal Dosing Policy    Patient clinical status and labs ordered/reviewed. Pt Weight Weight: 72.5 kg (159 lb 12.8 oz)   Serum Creatinine Lab Results   Component Value Date/Time    Creatinine 1.33 (H) 04/11/2022 11:17 AM    Creatinine, POC 1.2 04/01/2021 01:25 PM       Creatinine Clearance Estimated Creatinine Clearance: 45.3 mL/min (A) (based on SCr of 1.33 mg/dL (H)). BUN Lab Results   Component Value Date/Time    BUN 16 04/11/2022 11:17 AM    BUN, POC 16 01/05/2010 02:09 PM       WBC Lab Results   Component Value Date/Time    WBC 6.6 04/11/2022 11:17 AM      Temperature Temp: 98.1 °F (36.7 °C)   HR Pulse (Heart Rate): 73     BP BP: (!) 148/79           Drug type: Non-Antibiotic      Drug/dose: for naïve patient was initiated at: 20 mg po daily adjusted from Q12hr    Continue to monitor.     Signed GUILLERMINA Segura [facility]  Date 4/12/2022  Time 9:00 AM

## 2022-04-12 NOTE — PROGRESS NOTES
[facility] Hospitalist Group  Progress Note    Patient: Romario Servin Age: 64 y.o. : 1960 MR#: [id number] SSN: [id number]  Date: 2022         Assessment/Plan:     Hospital Problems  Date Reviewed: 2021          Codes Class Noted POA    Bipolar 1 disorder ([facility] ICD-10-CM: F31.9  ICD-9-CM: 296.7  2022 Unknown        * (Principal) Dizziness ICD-10-CM: R42  ICD-9-CM: 780.4  2022 Unknown        DM (diabetes mellitus) ([facility] ICD-10-CM: E11.9  ICD-9-CM: 250.00  2014 Yes        Hypertension ICD-10-CM: I10  ICD-9-CM: 401.9  2014 Yes        Hypothyroidism ICD-10-CM: E03.9  ICD-9-CM: 244.9  2014 Yes            Dizziness  - MRI negative for CVA  - appreciate neuro  - PT/OT evals pending  - echo pending  - asa, statin  - cardiac monitoring    HTN  - resume lisinopril    DM  - ssi   - monitor achs  - a1c 6.8    Hypothyroidism  - TSH 7  - cont synthroid 125mcg.   - needs f/u with PCP regarding possible increase in dosage    Bipolar d/o  - cont lamictal, effexor        DVT Prophylaxis:  []Lovenox  [x]Hep SQ  []SCDs  []Coumadin   []On Heparin gtt []PO anticoagulant    Anticipated discharge: tomorrow    Subjective:     Pt s/e @ bedside. No major events overnight. Pt offers no complaints this AM. She states dizziness has resolved, but now she has generalized weakness. Denies CP or SOB. Denies abd pain.     Objective:   VS:   Visit Vitals  BP (!) 146/84   Pulse 69   Temp 98 °F (36.7 °C)   Resp 19   Ht 5' 6\" (1.676 m)   Wt 72.1 kg (159 lb)   SpO2 95%   BMI 25.66 kg/m²      Tmax/24hrs: Temp (24hrs), Av °F (36.7 °C), Min:97.8 °F (36.6 °C), Max:98.3 °F (36.8 °C)      Intake/Output Summary (Last 24 hours) at 2022 1419  Last data filed at 2022 0538  Gross per 24 hour   Intake 1806.66 ml   Output --   Net 1806.66 ml       General Appearance: NAD, conversant  HENT: normocephalic/atraumatic, moist mucus membranes  Lungs: CTA with normal respiratory effort  CV: RRR, no m/r/g  Abdomen: soft, non-tender, normal bowel sounds  Extremities: no cyanosis, no peripheral edema  Neuro: No focal deficits, motor/sensory intact  Skin: Normal color, intact      Current Facility-Administered Medications   Medication Dose Route Frequency    aspirin chewable tablet 81 mg  81 mg Oral DAILY    lamoTRIgine (LaMICtal) tablet 200 mg  200 mg Oral BID    levothyroxine (SYNTHROID) tablet 100 mcg  100 mcg Oral 6am    QUEtiapine (SEROquel) tablet 200 mg  200 mg Oral QHS    traZODone (DESYREL) tablet 100 mg  100 mg Oral QHS PRN    venlafaxine-SR (EFFEXOR-XR) capsule 150 mg  150 mg Oral DAILY WITH BREAKFAST    0.9% sodium chloride infusion  75 mL/hr IntraVENous CONTINUOUS    atorvastatin (LIPITOR) tablet 80 mg  80 mg Oral QHS    acetaminophen (TYLENOL) tablet 650 mg  650 mg Oral Q4H PRN    labetaloL (NORMODYNE;TRANDATE) 20 mg/4 mL (5 mg/mL) injection 5 mg  5 mg IntraVENous Q10MIN PRN    polyethylene glycol (MIRALAX) packet 17 g  17 g Oral DAILY PRN    famotidine (PEPCID) tablet 20 mg  20 mg Oral DAILY    heparin (porcine) injection 5,000 Units  5,000 Units SubCUTAneous Q8H    0.9% NaCl bacteriostatic (NORMAL SALINE) 0.9 % injection 10 mL  10 mL IntraVENous CARD ONCE    0.9% NaCl bacteriostatic (NORMAL SALINE) 0.9 % injection 10 mL  10 mL IntraVENous CARD ONCE        Labs:    Recent Results (from the past 24 hour(s))   GLUCOSE, POC    Collection Time: 04/11/22  3:38 PM   Result Value Ref Range    Glucose (POC) 105 70 - 110 mg/dL   GLUCOSE, POC    Collection Time: 04/11/22  9:06 PM   Result Value Ref Range    Glucose (POC) 140 (H) 70 - 110 mg/dL   GLUCOSE, POC    Collection Time: 04/12/22  6:57 AM   Result Value Ref Range    Glucose (POC) 132 (H) 70 - 110 mg/dL   LIPID PANEL    Collection Time: 04/12/22  9:44 AM   Result Value Ref Range    LIPID PROFILE          Cholesterol, total 180 <200 MG/DL    Triglyceride 228 (H) <150 MG/DL    HDL Cholesterol 58 40 - 60 MG/DL    LDL, calculated 76.4 0 - 100 MG/DL    VLDL, calculated 45.6 MG/DL    CHOL/HDL Ratio 3.1 0 - 5.0     HEMOGLOBIN A1C WITH EAG    Collection Time: 04/12/22  9:44 AM   Result Value Ref Range    Hemoglobin A1c 6.8 (H) 4.2 - 5.6 %    Est. average glucose 148 mg/dL   TSH 3RD GENERATION    Collection Time: 04/12/22  9:44 AM   Result Value Ref Range    TSH 7.08 (H) 0.36 - 3.74 uIU/mL   T4, FREE    Collection Time: 04/12/22  9:44 AM   Result Value Ref Range    T4, Free 1.1 0.7 - 1.5 NG/DL   MAGNESIUM    Collection Time: 04/12/22  9:44 AM   Result Value Ref Range    Magnesium 2.5 1.6 - 2.6 mg/dL   GLUCOSE, POC    Collection Time: 04/12/22 11:11 AM   Result Value Ref Range    Glucose (POC) 120 (H) 70 - 110 mg/dL       Imaging:  MRI BRAIN WO CONT    Result Date: 4/11/2022  EXAM: MRI of the Head without contrast INDICATION: r/o CVA dizziness and difficulties with balance TECHNIQUE: MRI of the head without IV contrast. Multiplanar multisequence MR images of the brain without contrast. IV contrast:  None COMPARISON: CT dated 4/11/2022 FINDINGS: No focus of restricted diffusion appreciated. The ventricles and sulci are symmetric but moderately prominent. Mild periventricular white matter hyperintensities are noted. No midline shift, mass effect, or mass lesion appreciated. The grey-white junction is intact. No evidence for an acute infarct identified. No focus of abnormal susceptibility appreciated. The vascular flow voids are intact. The cerebellopontine angles are unremarkable. The visualized internal auditory canals and semicircular canals are unremarkable. The pituitary is normal. The mastoid air cells are unremarkable. The visualized paranasal sinuses are unremarkable. The orbits are unremarkable. The scalp and skull are unremarkable. 1.  No acute intracranial process. 2.  Mild parenchymal volume loss and chronic small vessel ischemic changes.         Signed By: [facility]  Hospitalist Division  Office: [phone]  Pager: [phone]         April 12, 2022 2:19 PM

## 2022-04-12 NOTE — PROGRESS NOTES
Stroke Education provided to patient and the following topics were discussed    1. Patients personal risk factors for stroke are hypertension, diabetes mellitus, obesity and Other seiizure, hypothroid    2. Warning signs of Stroke:        * Sudden numbness or weakness of the face, arm or leg, especially on one side of          The body            * Sudden confusion, trouble speaking or understanding        * Sudden trouble seeing in one or both eyes        * Sudden trouble walking, dizziness, loss of balance or coordination        * Sudden severe headache with no known cause      3. Importance of activation Emergency Medical Services ( 9-1-1 ) immediately if experience any warning signs of stroke. 4. Be sure and schedule a follow-up appointment with your primary care doctor or any specialists as instructed. 5. You must take medicine every day to treat your risk factors for stroke. Be sure to take your medicines exactly as your doctor tells you: no more, no less. Know what your medicines are for , what they do. Anti-thrombotics /anticoagulants can help prevent strokes. You are taking the following medicine(s)   see mar    6. Smoking and second-hand smoke greatly increase your risk of stroke, cardiovascular disease and death. Smoking history packs, 1 per day or started year 1972    7. Information provided was [facility] Stroke Education Binder    8. Documentation of teaching completed in Patient Education Activity and on Care Plan with teaching response noted?   yes

## 2022-04-12 NOTE — PROGRESS NOTES
Problem: Pain  Goal: *Control of Pain  Outcome: Progressing Towards Goal  Goal: *PALLIATIVE CARE:  Alleviation of Pain  Outcome: Progressing Towards Goal     Problem: Patient Education: Go to Patient Education Activity  Goal: Patient/Family Education  Outcome: Progressing Towards Goal     Problem: Injury - Risk of, Adverse Drug Event  Goal: *Absence of adverse drug events  Outcome: Progressing Towards Goal  Goal: *Absence of medication errors  Outcome: Progressing Towards Goal  Goal: *Knowledge of prescribed medications  Outcome: Progressing Towards Goal     Problem: Patient Education: Go to Patient Education Activity  Goal: Patient/Family Education  Outcome: Progressing Towards Goal     Problem: Falls - Risk of  Goal: *Absence of Falls  Description: Document Jose Fall Risk and appropriate interventions in the flowsheet.   Outcome: Progressing Towards Goal  Note: Fall Risk Interventions:            Medication Interventions: Patient to call before getting OOB    Elimination Interventions: Call light in reach              Problem: Patient Education: Go to Patient Education Activity  Goal: Patient/Family Education  Outcome: Progressing Towards Goal     Problem: General Medical Care Plan  Goal: *Vital signs within specified parameters  Outcome: Progressing Towards Goal  Goal: *Labs within defined limits  Outcome: Progressing Towards Goal  Goal: *Absence of infection signs and symptoms  Outcome: Progressing Towards Goal  Goal: *Optimal pain control at patient's stated goal  Outcome: Progressing Towards Goal  Goal: *Skin integrity maintained  Outcome: Progressing Towards Goal  Goal: *Fluid volume balance  Outcome: Progressing Towards Goal  Goal: *Optimize nutritional status  Outcome: Progressing Towards Goal  Goal: *Anxiety reduced or absent  Outcome: Progressing Towards Goal  Goal: *Progressive mobility and function (eg: ADL's)  Outcome: Progressing Towards Goal     Problem: Patient Education: Go to Patient Education Activity  Goal: Patient/Family Education  Outcome: Progressing Towards Goal

## 2022-04-12 NOTE — PROGRESS NOTES
Re:  Romario Beard Willard,Follow up visit     4/12/2022 4:49 PM    SSN: [id number]    Subjective:   Zita Guillen was seen on follow-up. No acute changes. She is feeling better today and more stronger. Moving her arms and legs symmetrically. Some dizziness when she was trying to stand up. The MRI of the brain did not show any acute stroke; has shown mild parenchymal volume loss and chronic small vessel changes. Echocardiography showed a normal ejection fraction with no shunt or thrombus.     Medications:    Current Facility-Administered Medications   Medication Dose Route Frequency Provider Last Rate Last Admin    aspirin chewable tablet 81 mg  81 mg Oral DAILY Shabbir Cotton, DO   81 mg at 04/12/22 1329    lamoTRIgine (LaMICtal) tablet 200 mg  200 mg Oral BID Jesusrie Beulah, DO   200 mg at 04/12/22 1209    traZODone (DESYREL) tablet 100 mg  100 mg Oral QHS PRN Israel Riojas, DO        venlafaxine-SR [facility] capsule 150 mg  150 mg Oral DAILY WITH BREAKFAST Mike Cotton, DO   150 mg at 04/12/22 5742    0.9% sodium chloride infusion  75 mL/hr IntraVENous CONTINUOUS Jesusrie Beulah, DO 75 mL/hr at 04/12/22 0922 75 mL/hr at 04/12/22 4319    atorvastatin (LIPITOR) tablet 80 mg  80 mg Oral QHS Shabbir Cotton, DO        acetaminophen (TYLENOL) tablet 650 mg  650 mg Oral Q4H PRN Shabbir Cotton, DO        labetaloL (NORMODYNE;TRANDATE) 20 mg/4 mL (5 mg/mL) injection 5 mg  5 mg IntraVENous Q10MIN PRN Shabbir Cotton, DO        polyethylene glycol (MIRALAX) packet 17 g  17 g Oral DAILY PRN Shabbir Damico, DO        famotidine (PEPCID) tablet 20 mg  20 mg Oral DAILY Shabbir Cotton, DO   20 mg at 04/12/22 1209    heparin (porcine) injection 5,000 Units  5,000 Units SubCUTAneous Q8H Shabbir Cotton, DO   5,000 Units at 04/12/22 0922    [START ON 4/13/2022] lisinopriL (PRINIVIL, ZESTRIL) tablet 5 mg  5 mg Oral DAILY Reprogle, Phill Rinne, PA        insulin lispro (HUMALOG) injection   SubCUTAneous AC&HS Roger Turpin PA        glucose chewable tablet 16 g  4 Tablet Oral PRN Roger Turpin PA        glucagon (GLUCAGEN) injection 1 mg  1 mg IntraMUSCular PRN Roger Turpin PA        dextrose 10% infusion 0-250 mL  0-250 mL IntraVENous PRN Roger Turpin PA        [START ON 4/13/2022] levothyroxine (SYNTHROID) tablet 125 mcg  125 mcg Oral 6am Roger Turpin PA        QUEtiapine (SEROquel) tablet 100 mg  100 mg Oral QHS ReprogleRoger PA           Vital signs:    Visit Vitals  BP (!) 177/79   Pulse 72   Temp 98.1 °F (36.7 °C)   Resp 18   Ht 5' 6\" (1.676 m)   Wt 72.1 kg (159 lb)   SpO2 94%   BMI 25.66 kg/m²       Review of Systems:   Constitutional no fever or chills  Skin denies rash or itching  HENT  Denies tinnitus, hearing lose  Eyes denies diplopia vision lose  Respiratory denies shortness of breath  Cardiovascular denies chest pain, dyspnea on exertion  Gastrointestinal denies nausea, vomiting  Genitourinary: no incontinence. Musculoskeletal: has knee  joint and low back pain  Hematology: has easy bruisability. Neurological as above in HPI      Patient Active Problem List   Diagnosis Code    Severe obesity ([address] E66.01    Chronic kidney disease, stage 3 (moderate) N18.30    Diet-controlled type 2 diabetes mellitus ([facility] E11.9    DM (diabetes mellitus) ([facility] E11.9    Dry eyes H04.123    Glaucoma suspect H40.009    Hypertension I10    Hypothyroidism E03.9    Smoker F17.200    Tobacco use Z72.0    B12 deficiency E53.8    Dislocation, finger, initial encounter S63.259A    Frequent falls R29.6    Laceration of little finger without foreign body without damage to nail S61.218A    Asthmatic bronchitis without complication A94.531    Severe depressed bipolar I disorder without psychotic features (HCC) F31.4    Dizziness R42    Bipolar 1 disorder (HCC) F31.9         Objective:   GENERAL:                  In no apparent distress.    EXTREMITIES:           Muscle tone is normal.  HEAD:                         The patient is normocephalic.     NEUROLOGIC EXAMINATION    Mental status: Awake, alert, oriented x3, follows simple and complex commands, no extinction; no gaze deviation. Speech and languge: slow and speaks in low tone; otherwise  coherent and comprehension intact  CN: VFF, EOMI, PERRLA, no facial asymmetry noted, palate elevation symmetric bilat, SS+SCM 5/5 bilat, tongue midline  Motor:  No pronator drift; normal tone bilaterally; strength is 5/5 bilaterally. Sensory:  Intact to light touch bilaterally. Coordination: FNF  accurate w/o dysmetria  DTR: 2+ over the upper extremities; 3+ over the lower extremities. Gait: not tested     Study Result     Narrative & Impression   EXAM: CTA HEAD NECK W CONT     CLINICAL INDICATIONS: r/o cva, LVO CODE S stroke alert     PRELIMINARY REPORT:  Patent cervical carotid and vertebral arteries without significant stenosis.     Patent intracranial circulation.  No large vessel occlusion.      Study Result     Narrative & Impression   CT HEAD WO CONT     History: Dizziness, acute onset blurry vision, lupus, tremors, hypertension.        Comparison: 10/28/2009     TECHNIQUE: 5 mm helical scan obtained of the head were obtained from the skull  vertex through the base of the skull without intravenous contrast.       All CT scans at this facility are performed using dose optimization technique as  appropriate to a performed exam, to include automated exposure control,  adjustment of the mA and/or kV according to patient size (including appropriate  matching first site-specific examinations), or use of iterative reconstruction  technique.     BRAIN RESULT:       Acute change:   No evidence of an acute infarct or other acute parenchymal  process.      Hemorrhage:    No evidence of acute intracranial hemorrhage.     Mass Effect / Mass Lesion:    There is no evidence of an intracranial mass or  extraaxial fluid collection.  No significant mass effect.      Chronic change:    None apparent.     Parenchyma:  There is no significant volume loss.  The brain parenchyma is  otherwise within normal limits for age.     Ventricles:     The ventricles are within normal limits of size and  configuration for age.     Other:  The visualized paranasal sinuses are grossly clear.  The skull and  visualized extracranial soft tissues are grossly normal.          IMPRESSION     No acute findings, though MR is more sensitive for acute stroke evaluation.          Study Result    Narrative & Impression   EXAM: MRI of the Head without contrast     INDICATION: r/o CVA dizziness and difficulties with balance     TECHNIQUE: MRI of the head without IV contrast. Multiplanar multisequence MR  images of the brain without contrast.      IV contrast:  None     COMPARISON: CT dated 4/11/2022     FINDINGS: No focus of restricted diffusion appreciated. The ventricles and sulci  are symmetric but moderately prominent. Mild periventricular white matter  hyperintensities are noted. No midline shift, mass effect, or mass lesion  appreciated. The grey-white junction is intact. No evidence for an acute  infarct identified. No focus of abnormal susceptibility appreciated. The  vascular flow voids are intact. The cerebellopontine angles are unremarkable. The visualized internal auditory canals and semicircular canals are  unremarkable. The pituitary is normal. The mastoid air cells are unremarkable. The visualized paranasal sinuses are unremarkable. The orbits are unremarkable. The scalp and skull are unremarkable.     IMPRESSION  1. No acute intracranial process.     2.  Mild parenchymal volume loss and chronic small vessel ischemic changes.        CBC:   Lab Results   Component Value Date/Time    WBC 6.6 04/11/2022 11:17 AM    RBC 4.75 04/11/2022 11:17 AM    HGB 13.6 04/11/2022 11:17 AM    HCT 41.4 04/11/2022 11:17 AM    PLATELET 712 77/99/6968 11:17 AM     BMP:   Lab Results   Component Value Date/Time    Glucose 141 (H) 04/11/2022 11:17 AM    Sodium 139 04/11/2022 11:17 AM    Potassium 4.1 04/11/2022 11:17 AM    Chloride 105 04/11/2022 11:17 AM    CO2 31 04/11/2022 11:17 AM    BUN 16 04/11/2022 11:17 AM    Creatinine 1.33 (H) 04/11/2022 11:17 AM    Calcium 9.1 04/11/2022 11:17 AM     CMP:   Lab Results   Component Value Date/Time    Glucose 141 (H) 04/11/2022 11:17 AM    Sodium 139 04/11/2022 11:17 AM    Potassium 4.1 04/11/2022 11:17 AM    Chloride 105 04/11/2022 11:17 AM    CO2 31 04/11/2022 11:17 AM    BUN 16 04/11/2022 11:17 AM    Creatinine 1.33 (H) 04/11/2022 11:17 AM    Calcium 9.1 04/11/2022 11:17 AM    Anion gap 3 04/11/2022 11:17 AM    BUN/Creatinine ratio 12 04/11/2022 11:17 AM    Alk. phosphatase 114 04/11/2022 11:17 AM    Protein, total 6.9 04/11/2022 11:17 AM    Albumin 3.5 04/11/2022 11:17 AM    Globulin 3.4 04/11/2022 11:17 AM    A-G Ratio 1.0 04/11/2022 11:17 AM     Coagulation:   Lab Results   Component Value Date/Time    Prothrombin time 11.3 (L) 04/11/2022 11:17 AM    INR 0.8 04/11/2022 11:17 AM    aPTT 23.8 04/11/2022 11:17 AM     Cardiac markers:   Lab Results   Component Value Date/Time    CK 92 08/14/2010 07:56 PM    CK-MB Index CALCULATION NOT PERFORMED WHEN MB IS <0.5 08/14/2010 07:56 PM     ABGs: No results found for: PH, PO2, PCO2, HCO3, BD, BE    Impression/plan:         A 64years old female patient with above medical problems was brought to the emergency room for an acute onset generalized weakness and dizziness/lightheadedness. Exam is nonfocal but she said has decreased light touch of the right upper and lower extremity. CT scan of the brain and CT angiography of the head and neck are unremarkable. MRI of the brain from overnight also did not show any acute stroke. Metabolic panels are grossly unremarkable. No signs of infection. Echocardiography unremarkable.  Generalized weakness could be from other medical reasons or medications; or nalso underlying psychiatric disorders. No additional neuro work-ups at this time. Patient has a known moderate to severe lumbar spinal canal stenosis for which she is following with neurosurgery; I have advised her to follow closely with them. Call for questions. We will sign off. Sincerely,      Nat Brown M.D. PLEASE NOTE:   This document has been produced using voice recognition software. Unrecognized errors in transcription may be present.

## 2022-04-12 NOTE — ROUTINE PROCESS
Bedside and Verbal shift change report given to Shakeel CUNNINGHAM RN (oncoming nurse) by Anshul MERCADO RN (offgoing nurse). Report included the following information SBAR, Kardex and Recent Results.

## 2022-04-12 NOTE — ROUTINE PROCESS
Bedside and Verbal shift change report given to [employer] (oncoming nurse) by Mey Montoya RN (offgoing nurse). Report included the following information SBAR, Kardex, MAR and Recent Results.     SITUATION:    Code Status: Prior   Reason for Admission: Dizziness [R42]    [facility] day: 0   Problem List:       Hospital Problems  Date Reviewed: 6/24/2021          Codes Class Noted POA    Dizziness ICD-10-CM: R42  ICD-9-CM: 780.4  4/11/2022 Unknown              BACKGROUND:    Past Medical History:   Past Medical History:   Diagnosis Date    Asthma, chronic, moderate persistent, with acute exacerbation     Asthmatic bronchitis without complication 60/3/8179    Bipolar 1 disorder, manic, mild ([facility]     CAD (coronary artery disease)     Controlled type 2 diabetes mellitus without complication, without long-term current use of insulin (HCC)     Diabetes ([facility]     Hyperlipidemia     Hypertension     Hypothyroidism     Liver disease          Patient taking anticoagulants no     ASSESSMENT:    Changes in Assessment Throughout Shift: No     Patient has Central Line: no Reasons if yes: N/A   Patient has Bojorquez Cath: no Reasons if yes: BSC      Last Vitals:     Vitals:    04/12/22 0000 04/12/22 0022 04/12/22 0400 04/12/22 0413   BP:  134/81  (!) 148/79   Pulse: 72 67 (!) 58 73   Resp:  18  19   Temp:  97.8 °F (36.6 °C)  98.1 °F (36.7 °C)   SpO2:  93%  94%   Weight:       Height:            IV and DRAINS (will only show if present)   Peripheral IV 04/11/22 Left Antecubital-Site Assessment: Clean, dry, & intact  Peripheral IV 04/11/22 Left Hand-Site Assessment: Clean, dry, & intact     WOUND (if present)   Wound Type:  none   Dressing present Dressing Present : No   Wound Concerns/Notes:  none     PAIN    Pain Assessment    Pain Intensity 1: 0 (04/12/22 0022)              Patient Stated Pain Goal: 0  o Interventions for Pain:  none  o Intervention effective: no  o Time of last intervention: N/A o Reassessment Completed: no      Last 3 Weights:  Last 3 Recorded Weights in this Encounter    04/11/22 1436   Weight: 72.5 kg (159 lb 12.8 oz)     Weight change:      INTAKE/OUPUT    Current Shift: No intake/output data recorded. Last three shifts: 04/10 1901 - 04/12 0700  In: 1806.7 [I.V.:1806.7]  Out: -      LAB RESULTS     Recent Labs     04/11/22  1117   WBC 6.6   HGB 13.6   HCT 41.4           Recent Labs     04/11/22  1117      K 4.1   *   BUN 16   CREA 1.33*   CA 9.1   INR 0.8       RECOMMENDATIONS AND DISCHARGE PLANNING     1. Pending tests/procedures/ Plan of Care or Other Needs: See Provider Notes     2. Discharge plan for patient and Needs/Barriers: N/A    3. Estimated Discharge Date: TBD Posted on Whiteboard in Patients Room: no      4. The patient's care plan was reviewed with the oncoming nurse. \"HEALS\" SAFETY CHECK      Fall Risk    Total Score: 2    Safety Measures: Safety Measures: Bed/Chair alarm on,Bed/Chair-Wheels locked,Bed in low position,Call light within reach,Fall prevention (comment),Side rails X 3    A safety check occurred in the patient's room between off going nurse and oncoming nurse listed above. The safety check included the below items  Area Items   H  High Alert Medications - Verify all high alert medication drips (heparin, PCA, etc.)   E  Equipment - Suction is set up for ALL patients (with yanker)  - Red plugs utilized for all equipment (IV pumps, etc.)  - WOWs wiped down at end of shift.  - Room stocked with oxygen, suction, and other unit-specific supplies   A  Alarms - Bed alarm is set for fall risk patients  - Ensure chair alarm is in place and activated if patient is up in a chair   L  Lines - Check IV for any infiltration  - Bojorquez bag is empty if patient has a Bojorquez   - Tubing and IV bags are labeled   S  Safety   - Room is clean, patient is clean, and equipment is clean. - Hallways are clear from equipment besides carts.    - Fall bracelet on for fall risk patients  - Ensure room is clear and free of clutter  - Suction is set up for ALL patients (with rafa)  - Hallways are clear from equipment besides carts.    - Isolation precautions followed, supplies available outside room, sign posted     Pedrito Ventura RN

## 2022-04-12 NOTE — PROGRESS NOTES
SLP Note:    SLP evaluation orders received. Upon chart review and discussion with pt, no skilled SLP evaluation indicated at this time. Pt tolerating regular diet with thin liquids with no reported distress (passed dysphagia screen). Speech and voice within functional limits. Educated with regard to role of SLP, s/sx aspiration and to alert MD/RN if symptoms arise. Pt verbalized understanding. Will sign off.        Thank you for this referral,   Patrick Woods M.S., [id number] [facility]  Speech-Language Pathologist

## 2022-04-12 NOTE — PROGRESS NOTES
ARU/IPR REFERRAL CONTACT NOTE  [id number] [place] [address] for Physical Rehabilitation    RE:  Arnoldo Escobar Thank you for the opportunity to review this patient's case for admission to [id number] [place] [address] for Physical Rehabilitation. Based on our pre-admission screening:     [ Franny Merino Team/Medical Director is following this case. Comments:  Referral received. SLP screened patient and no SLP needs identified. PT and OT evaluations and recommendations pending. Will follow along for potential IPR needs. Pt currently in Observation status. Please be advised admission to ARU will be based on meeting admission requirements, authorization from [facility]      Thank you for this referral.   Please do not hesitate to call if you need further information or have additional questions.      Regards,    Amirah Pinto PTA   Admissions [facility] for Physical Rehabilitation  [phone]

## 2022-04-13 ENCOUNTER — HOME HEALTH ADMISSION (OUTPATIENT)
Dept: HOME HEALTH SERVICES | Facility: HOME HEALTH | Age: 62
End: 2022-04-13
Payer: MEDICARE

## 2022-04-13 VITALS
HEIGHT: 66 IN | OXYGEN SATURATION: 92 % | HEART RATE: 76 BPM | SYSTOLIC BLOOD PRESSURE: 147 MMHG | RESPIRATION RATE: 16 BRPM | DIASTOLIC BLOOD PRESSURE: 85 MMHG | TEMPERATURE: 97.9 F | BODY MASS INDEX: 25.55 KG/M2 | WEIGHT: 159 LBS

## 2022-04-13 LAB
ANION GAP SERPL CALC-SCNC: 5 MMOL/L (ref 3–18)
BASOPHILS # BLD: 0 K/UL (ref 0–0.1)
BASOPHILS NFR BLD: 1 % (ref 0–2)
BUN SERPL-MCNC: 14 MG/DL (ref 7–18)
BUN/CREAT SERPL: 13 (ref 12–20)
CALCIUM SERPL-MCNC: 9.1 MG/DL (ref 8.5–10.1)
CHLORIDE SERPL-SCNC: 106 MMOL/L (ref 100–111)
CO2 SERPL-SCNC: 31 MMOL/L (ref 21–32)
CREAT SERPL-MCNC: 1.07 MG/DL (ref 0.6–1.3)
DIFFERENTIAL METHOD BLD: ABNORMAL
EOSINOPHIL # BLD: 0.1 K/UL (ref 0–0.4)
EOSINOPHIL NFR BLD: 1 % (ref 0–5)
ERYTHROCYTE [DISTWIDTH] IN BLOOD BY AUTOMATED COUNT: 15 % (ref 11.6–14.5)
GLUCOSE BLD STRIP.AUTO-MCNC: 101 MG/DL (ref 70–110)
GLUCOSE BLD STRIP.AUTO-MCNC: 146 MG/DL (ref 70–110)
GLUCOSE SERPL-MCNC: 123 MG/DL (ref 74–99)
HCT VFR BLD AUTO: 38.6 % (ref 35–45)
HGB BLD-MCNC: 12.1 G/DL (ref 12–16)
IMM GRANULOCYTES # BLD AUTO: 0.1 K/UL (ref 0–0.04)
IMM GRANULOCYTES NFR BLD AUTO: 1 % (ref 0–0.5)
LYMPHOCYTES # BLD: 1.9 K/UL (ref 0.9–3.6)
LYMPHOCYTES NFR BLD: 39 % (ref 21–52)
MAGNESIUM SERPL-MCNC: 2.5 MG/DL (ref 1.6–2.6)
MCH RBC QN AUTO: 28 PG (ref 24–34)
MCHC RBC AUTO-ENTMCNC: 31.3 G/DL (ref 31–37)
MCV RBC AUTO: 89.4 FL (ref 78–100)
MONOCYTES # BLD: 0.4 K/UL (ref 0.05–1.2)
MONOCYTES NFR BLD: 8 % (ref 3–10)
NEUTS SEG # BLD: 2.5 K/UL (ref 1.8–8)
NEUTS SEG NFR BLD: 50 % (ref 40–73)
NRBC # BLD: 0 K/UL (ref 0–0.01)
NRBC BLD-RTO: 0 PER 100 WBC
PLATELET # BLD AUTO: 198 K/UL (ref 135–420)
PMV BLD AUTO: 11 FL (ref 9.2–11.8)
POTASSIUM SERPL-SCNC: 4 MMOL/L (ref 3.5–5.5)
RBC # BLD AUTO: 4.32 M/UL (ref 4.2–5.3)
SODIUM SERPL-SCNC: 142 MMOL/L (ref 136–145)
WBC # BLD AUTO: 5 K/UL (ref 4.6–13.2)

## 2022-04-13 PROCEDURE — 85025 COMPLETE CBC W/AUTO DIFF WBC: CPT

## 2022-04-13 PROCEDURE — 97161 PT EVAL LOW COMPLEX 20 MIN: CPT

## 2022-04-13 PROCEDURE — 99217 PR OBSERVATION CARE DISCHARGE MANAGEMENT: CPT | Performed by: EMERGENCY MEDICINE

## 2022-04-13 PROCEDURE — 97116 GAIT TRAINING THERAPY: CPT

## 2022-04-13 PROCEDURE — APPSS45 APP SPLIT SHARED TIME 31-45 MINUTES: Performed by: PHYSICIAN ASSISTANT

## 2022-04-13 PROCEDURE — 74011250637 HC RX REV CODE- 250/637: Performed by: PHYSICIAN ASSISTANT

## 2022-04-13 PROCEDURE — 36415 COLL VENOUS BLD VENIPUNCTURE: CPT

## 2022-04-13 PROCEDURE — G0378 HOSPITAL OBSERVATION PER HR: HCPCS

## 2022-04-13 PROCEDURE — 74011250637 HC RX REV CODE- 250/637: Performed by: INTERNAL MEDICINE

## 2022-04-13 PROCEDURE — 80048 BASIC METABOLIC PNL TOTAL CA: CPT

## 2022-04-13 PROCEDURE — 82962 GLUCOSE BLOOD TEST: CPT

## 2022-04-13 PROCEDURE — 97167 OT EVAL HIGH COMPLEX 60 MIN: CPT

## 2022-04-13 PROCEDURE — 83735 ASSAY OF MAGNESIUM: CPT

## 2022-04-13 PROCEDURE — 96372 THER/PROPH/DIAG INJ SC/IM: CPT

## 2022-04-13 PROCEDURE — 74011250636 HC RX REV CODE- 250/636: Performed by: INTERNAL MEDICINE

## 2022-04-13 PROCEDURE — 2709999900 HC NON-CHARGEABLE SUPPLY

## 2022-04-13 RX ORDER — FAMOTIDINE 20 MG/1
20 TABLET, FILM COATED ORAL 2 TIMES DAILY
Status: DISCONTINUED | OUTPATIENT
Start: 2022-04-13 | End: 2022-04-13 | Stop reason: HOSPADM

## 2022-04-13 RX ADMIN — FAMOTIDINE 20 MG: 20 TABLET ORAL at 08:37

## 2022-04-13 RX ADMIN — LISINOPRIL 5 MG: 5 TABLET ORAL at 08:37

## 2022-04-13 RX ADMIN — LEVOTHYROXINE SODIUM 125 MCG: 25 TABLET ORAL at 05:38

## 2022-04-13 RX ADMIN — HEPARIN SODIUM 5000 UNITS: 5000 INJECTION INTRAVENOUS; SUBCUTANEOUS at 08:37

## 2022-04-13 RX ADMIN — HEPARIN SODIUM 5000 UNITS: 5000 INJECTION INTRAVENOUS; SUBCUTANEOUS at 01:30

## 2022-04-13 RX ADMIN — VENLAFAXINE HYDROCHLORIDE 150 MG: 75 CAPSULE, EXTENDED RELEASE ORAL at 08:37

## 2022-04-13 RX ADMIN — ASPIRIN 81 MG 81 MG: 81 TABLET ORAL at 08:37

## 2022-04-13 RX ADMIN — LAMOTRIGINE 200 MG: 100 TABLET ORAL at 08:37

## 2022-04-13 NOTE — DISCHARGE INSTRUCTIONS
Patient armband removed and shredded    DISCHARGE SUMMARY from Nurse    PATIENT INSTRUCTIONS:  What to do at Home:  Recommended activity: Activity as tolerated,     If you experience any of the following symptoms increased dizziness with or without nausea/vomiting,increased weakness or any untoward effects, please follow up with nearest emergency room or primary care physician . *  Please give a list of your current medications to your Primary Care Provider. *  Please update this list whenever your medications are discontinued, doses are      changed, or new medications (including over-the-counter products) are added. *  Please carry medication information at all times in case of emergency situations. These are general instructions for a healthy lifestyle:    No smoking/ No tobacco products/ Avoid exposure to second hand smoke  Surgeon General's Warning:  Quitting smoking now greatly reduces serious risk to your health. Obesity, smoking, and sedentary lifestyle greatly increases your risk for illness    A healthy diet, regular physical exercise & weight monitoring are important for maintaining a healthy lifestyle    You may be retaining fluid if you have a history of heart failure or if you experience any of the following symptoms:  Weight gain of 3 pounds or more overnight or 5 pounds in a week, increased swelling in our hands or feet or shortness of breath while lying flat in bed. Please call your doctor as soon as you notice any of these symptoms; do not wait until your next office visit. The discharge information has been reviewed with the patient. The patient verbalized understanding. Discharge medications reviewed with the patient and appropriate educational materials and side effects teaching were provided.   ___________________________________________________________________________________________________________________________________

## 2022-04-13 NOTE — ROUTINE PROCESS
Bedside and Verbal shift change report given to JAY Carmona (oncoming nurse) by Carlene Deleon RN (offgoing nurse). Report included the following information SBAR, Kardex, Intake/Output, MAR, Recent Results and Cardiac Rhythm sinus rhythm.

## 2022-04-13 NOTE — PROGRESS NOTES
Discharge order noted for today. Pt has been accepted to [facility] agency. Transport has been arranged through patient. Patient's  home health  orders have been forwarded to [facility] home health  agency via [address]    Discharge information has been documented on the AVS.           Nu Rios RN  Case Management [phone]

## 2022-04-13 NOTE — PROGRESS NOTES
DME Rolling Walker order noted. Per [facility] Nurse, patient already has a Rolling Walker at home. CM spoke with Wale Kirk with  [facility], and they can accept patient. CM put patient in the queue for [facility].              Lela Navarro, RN  Case Management [phone]

## 2022-04-13 NOTE — HOME CARE
Received  referral for SN,PT,OT;  Discharge order noted for today, spoke to patient ,Verified demographics,explained [facility] services and answered all questions; patient states she has RW,cane and shower chair ; [facility] referral processed to [facility] central intake. RANDALL CROCKER.

## 2022-04-13 NOTE — PROGRESS NOTES
Problem: Self Care Deficits Care Plan (Adult)  Goal: *Acute Goals and Plan of Care (Insert Text)  Outcome: Resolved/Met   OCCUPATIONAL THERAPY EVALUATION/DISCHARGE    Patient: Zita Guillen (7 y.o. female)  Date: 4/13/2022  Primary Diagnosis: Dizziness [R42]        Precautions:   Fall,Aspiration  PLOF: Mod I with ADLs and functional mobility using QC    ASSESSMENT AND RECOMMENDATIONS:  Nursing/RN cleared for pt to participate in OT evaluation and tx session. Patient presents lying supine in bed, A & O x 4, no c/o pain. Bed mobility: Mod I supine <-> sit edge of bed. LB dress: Mod I doff and don slipper sock seated edge of bed w/ Good sitting balance using crossing leg method. Toilet transfer: Mod I using RW with good balance, pt declined to void, reports she recently went, washing hands in stance at sink. Patient reports she bathed this AM ad lopez with Mod I. Patient resting semi-reclined in bed at end of tx session, Patient verbalized understanding to utilize call bell to request assist as needed. Nursing notified of pt's level of assist with toilet transfer using RW. Patient presents at baseline as PLOF with ADLs and functional mobility. Patient verbalized understanding of skilled OT services not indicated at this time while in acute hospital.     Skilled occupational therapy is not indicated at this time. Discharge Recommendations: Home Health for home safety assessment  Further Equipment Recommendations for Discharge: shower chair     SUBJECTIVE:   Patient stated My shower chair I have is too wide to fit.     OBJECTIVE DATA SUMMARY:     Past Medical History:   Diagnosis Date    Asthma, chronic, moderate persistent, with acute exacerbation     Asthmatic bronchitis without complication 12/8/3222    Bipolar 1 disorder, manic, mild (HCC)     CAD (coronary artery disease)     Controlled type 2 diabetes mellitus without complication, without long-term current use of insulin ([address]     Diabetes ([address]     Hyperlipidemia     Hypertension     Hypothyroidism     Liver disease      Past Surgical History:   Procedure Laterality Date    HX APPENDECTOMY  10/1969    HX BACK SURGERY       neck& back surgery    HX CERVICAL LAMINECTOMY      HX CYST REMOVAL Left     [facility]    HX HYSTERECTOMY Bilateral 10/1990    HX KNEE ARTHROSCOPY Left     torn cartilage x 2     Barriers to Learning/Limitations: None  Compensate with: visual, verbal, tactile, kinesthetic cues/model    Home Situation:   Home Situation  Home Environment: Private residence  [facility]  # Steps to Enter: 4  Rails to Enter: Yes  Hand Rails : Bilateral  Wheelchair Ramp: No  One/Two Story Residence: One story  Living Alone: No  Support Systems: Other (Comment) (4 roomates)  Patient Expects to be Discharged to[de-identified] Home  Current DME Used/Available at Home: Cane, quad,Grab bars  Tub or Shower Type: Tub/Shower combination  [x]     Right hand dominant   []     Left hand dominant    Cognitive/Behavioral Status:  Neurologic State: Alert  Orientation Level: Oriented X4  Cognition: Follows commands  Safety/Judgement: Fall prevention    Skin: appears intact    Edema: none noted     Vision/Perceptual:  appears intact, able to tell correct time on wall clock  Corrective Lenses: Glasses    Coordination: BUE  Coordination: Within functional limits  Fine Motor Skills-Upper: Left Intact; Right Intact    Gross Motor Skills-Upper: Left Intact; Right Intact    Balance:  Sitting: Intact  Standing: Impaired; With support  Standing - Static: Good  Standing - Dynamic : Fair (+)    Strength: BUE  Strength:  Within functional limits  Tone & Sensation: BUE  Tone: Normal  Sensation: Intact  Range of Motion: BUE  AROM: Within functional limits    Functional Mobility and Transfers for ADLs:  Bed Mobility:     Supine to Sit: Modified independent  Sit to Supine: Modified independent  Scooting: Modified independent  Transfers:  Sit to Stand: Modified independent   Toilet Transfer : Modified independent      ADL Assessment:  Feeding: Modified independent    Oral Facial Hygiene/Grooming: Modified Independent    Bathing: Modified independent    Upper Body Dressing: Modified independent    Lower Body Dressing: Modified independent    Toileting: Modified independent     ADL Intervention:  Grooming  Position Performed: Standing (w/ RW)  Washing Hands: Modified independent  Lower Body Dressing Assistance  Socks: Modified independent  Leg Crossed Method Used: Yes  Position Performed: Seated edge of bed  Cognitive Retraining  Safety/Judgement: Fall prevention    Pain:  Pain level pre-treatment: 0/10   Pain level post-treatment: 0/10   Activity Tolerance:   fair  Please refer to the flowsheet for vital signs taken during this treatment. After treatment:   []  Patient left in no apparent distress sitting up in chair  [x]  Patient left in no apparent distress in bed  [x]  Call bell left within reach  [x]  Nursing notified  []  Caregiver present  []  Bed alarm activated    COMMUNICATION/EDUCATION:   [x]      Role of Occupational Therapy in the acute care setting  [x]      Home safety education was provided and the patient/caregiver indicated understanding. [x]      Patient/family have participated as able and agree with findings and recommendations. []      Patient is unable to participate in plan of care at this time. Thank you for this referral.  Fina Ardon  Time Calculation: 11 mins      Eval Complexity: History: MEDIUM Complexity : Expanded review of history including physical, cognitive and psychosocial  history ; Examination: MEDIUM Complexity : 3-5 performance deficits relating to physical, cognitive , or psychosocial skils that result in activity limitations and / or participation restrictions; Decision Making:MEDIUM Complexity : Patient may present with comorbidities that affect occupational performnce.  Miniml to moderate modification of tasks or assistance (eg, physical or verbal ) with assesment(s) is necessary to enable patient to complete evaluation

## 2022-04-13 NOTE — DISCHARGE SUMMARY
[facility] Hospitalist Group    Discharge Summary    Patient: Castro Kahn MRN: [id number]  [facility]: [id number]    YOB: 1960  Age: 64 y.o. Sex: female    DOA: 4/11/2022 LOS:  LOS: 0 days   Discharge Date:      Admission Diagnoses: Dizziness [R42]    Discharge Diagnoses:    Hospital Problems  Date Reviewed: 6/24/2021          Codes Class Noted POA    Bipolar 1 disorder ([facility] ICD-10-CM: F31.9  ICD-9-CM: 296.7  4/12/2022 Unknown        * (Principal) Dizziness ICD-10-CM: R42  ICD-9-CM: 780.4  4/11/2022 Unknown        DM (diabetes mellitus) ([facility] ICD-10-CM: E11.9  ICD-9-CM: 250.00  7/7/2014 Yes        Hypertension ICD-10-CM: I10  ICD-9-CM: 401.9  4/28/2014 Yes        Hypothyroidism ICD-10-CM: E03.9  ICD-9-CM: 244.9  2/20/2014 Yes              Discharge Condition: Stable    Discharge To: Home    Consults: Neurology    HPI: Per admitting provider, \"Ms. Yolis Sagastume is a 58-year-old female with a past medical history significant for hypertension, hypothyroidism, diabetes mellitus type 2, coronary artery disease, bipolar disease, chronic persistent asthma who is presenting to the emergency department after developing dizziness. She tells me that she was sitting outside on her porch this morning when she had developed a left-sided headache blurred vision and dizziness. She was not doing anything in particular when this started. She denies any focal areas of weakness although she tells me that she is having a difficult time moving in general.  She states that she is unable to read and less she has her eyes closed. She has no chest pain shortness of breath cough nausea or vomiting. She has a no other recent changes in her health. No changes in her medication recently. Denies a history of migraine headaches. Denies ever having episodes of dizziness in the past.  Denies prior histories of seizures.   She states that when she tries to get up to walk she feels like she will fall down.     Work-up in the ER thus far has included WBCs 136/65, HR 86 temp 98.7. CT head and CTA neck with no evidence of intracranial hemorrhage patent cervical carotid and vertebral arteries as well as intracranial circulation without any occlusion. Creatinine is at 1.33 which is her baseline, troponins 6. CBC without any acute changes compared to prior admissions. UA with moderate leukocyte esterase and 4 WBCs. Per review of old records she did have a UTI with group B strep on March 9.\"    Hospital Course:     Dizziness  Neurology was consulted. CTA of head/neck is negative for occlusion. CT head negative. MRI brain negative for acute CVA. Neurology was consulted. Echo with normal EF, negative for shunt or thrombus. Dizziness resolved. Metabolic work up is negative. PT/OT recommended home health with rolling walker and shower chair. Patient was discharged home. CM arranged transport and home health through [facility] agency.     Hypothyroid  - TSH 7  - cont synthroid 125mcg.   - needs f/u with PCP regarding possible increase in dosage    HTN  - lisinopril resumed when MRI negative    Bipolar d/o  - home medications were continued      Physical Exam:  General appearance: alert, cooperative, no distress, appears stated age  Head: Normocephalic, without obvious abnormality, atraumatic  Lungs: clear to auscultation bilaterally  Heart: regular rate and rhythm, S1, S2 normal, no murmur, click, rub or gallop  Extremities: no cyanosis or edema  Skin: Skin color, texture normal. No rashes or lesions  Neurologic: no focal deficits, motor/sensory intact  PSY: Mood and affect normal, appropriately behaved    Significant Diagnostic Studies:     Recent Results (from the past 24 hour(s))   GLUCOSE, POC    Collection Time: 04/12/22 11:11 AM   Result Value Ref Range    Glucose (POC) 120 (H) 70 - 110 mg/dL   ECHO ADULT COMPLETE    Collection Time: 04/12/22  2:47 PM   Result Value Ref Range    IVSd 1.3 (A) 0.6 - 0.9 cm    LVIDd 5.1 3.9 - 5.3 cm    LVIDs 3.3 cm    LVOT Diameter 2.0 cm    LVPWd 1.3 (A) 0.6 - 0.9 cm    LVOT Peak Gradient 3 mmHg    LVOT Mean Gradient 2 mmHg    LVOT SV 65.3 ml    LVOT Peak Velocity 0.9 m/s    LVOT VTI 20.8 cm    RV Free Wall Peak S' 8 cm/s    LA Volume A/L 71 mL    LA Volume 2C 55 (A) 22 - 52 mL    LA Volume 4C 78 (A) 22 - 52 mL    MV Nyquist Velocity 38 cm/s    MV A Velocity 0.62 m/s    MV E Wave Deceleration Time 137.0 ms    MV E Velocity 0.90 m/s    LV E' Lateral Velocity 9 cm/s    LV E' Septal Velocity 7 cm/s    MV Regurg Velocity PISA 5.8 m/s    MR .3 cm    TAPSE 2.8 1.7 cm    TR Peak Gradient 27 mmHg    TR Max Velocity 2.61 m/s    Aortic Root 2.9 cm    Fractional Shortening 2D 35 28 - 44 %    LVIDd Index 2.82 cm/m2    LVIDs Index 1.82 cm/m2    LV RWT Ratio 0.51     LV Mass 2D 270.1 (A) 67 - 162 g    LV Mass 2D Index 149.2 (A) 43 - 95 g/m2    MV E/A 1.45     E/E' Ratio (Averaged) 11.43     E/E' Lateral 10.00     E/E' Septal 12.86     LA Volume Index A/L 39 16 - 34 mL/m2    LVOT Stroke Volume Index 36.1 mL/m2    LVOT Area 3.1 cm2    LA Volume Index 2C 30 16 - 34 mL/m2    LA Volume Index 4C 43 (A) 16 - 34 mL/m2    Ao Root Index 1.60 cm/m2    MV EROA Cont Eq 0.2 cm2   GLUCOSE, POC    Collection Time: 04/12/22  3:53 PM   Result Value Ref Range    Glucose (POC) 112 (H) 70 - 110 mg/dL   GLUCOSE, POC    Collection Time: 04/12/22  9:38 PM   Result Value Ref Range    Glucose (POC) 119 (H) 70 - 451 mg/dL   METABOLIC PANEL, BASIC    Collection Time: 04/13/22  2:45 AM   Result Value Ref Range    Sodium 142 136 - 145 mmol/L    Potassium 4.0 3.5 - 5.5 mmol/L    Chloride 106 100 - 111 mmol/L    CO2 31 21 - 32 mmol/L    Anion gap 5 3.0 - 18 mmol/L    Glucose 123 (H) 74 - 99 mg/dL    BUN 14 7.0 - 18 MG/DL    Creatinine 1.07 0.6 - 1.3 MG/DL    BUN/Creatinine ratio 13 12 - 20      GFR est AA >60 >60 ml/min/1.73m2    GFR est non-AA 52 (L) >60 ml/min/1.73m2    Calcium 9.1 8.5 - 10.1 MG/DL   CBC WITH AUTOMATED DIFF    Collection Time: 04/13/22  2:45 AM   Result Value Ref Range    WBC 5.0 4.6 - 13.2 K/uL    RBC 4.32 4.20 - 5.30 M/uL    HGB 12.1 12.0 - 16.0 g/dL    HCT 38.6 35.0 - 45.0 %    MCV 89.4 78.0 - 100.0 FL    MCH 28.0 24.0 - 34.0 PG    MCHC 31.3 31.0 - 37.0 g/dL    RDW 15.0 (H) 11.6 - 14.5 %    PLATELET 046 204 - 359 K/uL    MPV 11.0 9.2 - 11.8 FL    NRBC 0.0 0  WBC    ABSOLUTE NRBC 0.00 0.00 - 0.01 K/uL    NEUTROPHILS 50 40 - 73 %    LYMPHOCYTES 39 21 - 52 %    MONOCYTES 8 3 - 10 %    EOSINOPHILS 1 0 - 5 %    BASOPHILS 1 0 - 2 %    IMMATURE GRANULOCYTES 1 (H) 0.0 - 0.5 %    ABS. NEUTROPHILS 2.5 1.8 - 8.0 K/UL    ABS. LYMPHOCYTES 1.9 0.9 - 3.6 K/UL    ABS. MONOCYTES 0.4 0.05 - 1.2 K/UL    ABS. EOSINOPHILS 0.1 0.0 - 0.4 K/UL    ABS. BASOPHILS 0.0 0.0 - 0.1 K/UL    ABS. IMM. GRANS. 0.1 (H) 0.00 - 0.04 K/UL    DF AUTOMATED     MAGNESIUM    Collection Time: 04/13/22  2:45 AM   Result Value Ref Range    Magnesium 2.5 1.6 - 2.6 mg/dL   GLUCOSE, POC    Collection Time: 04/13/22  7:12 AM   Result Value Ref Range    Glucose (POC) 146 (H) 70 - 110 mg/dL       MRI BRAIN WO CONT    Result Date: 4/11/2022  EXAM: MRI of the Head without contrast INDICATION: r/o CVA dizziness and difficulties with balance TECHNIQUE: MRI of the head without IV contrast. Multiplanar multisequence MR images of the brain without contrast. IV contrast:  None COMPARISON: CT dated 4/11/2022 FINDINGS: No focus of restricted diffusion appreciated. The ventricles and sulci are symmetric but moderately prominent. Mild periventricular white matter hyperintensities are noted. No midline shift, mass effect, or mass lesion appreciated. The grey-white junction is intact. No evidence for an acute infarct identified. No focus of abnormal susceptibility appreciated. The vascular flow voids are intact. The cerebellopontine angles are unremarkable. The visualized internal auditory canals and semicircular canals are unremarkable.  The pituitary is normal. The mastoid air cells are unremarkable. The visualized paranasal sinuses are unremarkable. The orbits are unremarkable. The scalp and skull are unremarkable. 1.  No acute intracranial process. 2.  Mild parenchymal volume loss and chronic small vessel ischemic changes. CT HEAD WO CONT    Result Date: 4/11/2022  CT HEAD WO CONT History: Dizziness, acute onset blurry vision, lupus, tremors, hypertension. Comparison: 10/28/2009 TECHNIQUE: 5 mm helical scan obtained of the head were obtained from the skull vertex through the base of the skull without intravenous contrast.  All CT scans at this facility are performed using dose optimization technique as appropriate to a performed exam, to include automated exposure control, adjustment of the mA and/or kV according to patient size (including appropriate matching first site-specific examinations), or use of iterative reconstruction technique. BRAIN RESULT:  Acute change:   No evidence of an acute infarct or other acute parenchymal process. Hemorrhage:    No evidence of acute intracranial hemorrhage. Mass Effect / Mass Lesion:    There is no evidence of an intracranial mass or extraaxial fluid collection. No significant mass effect. Chronic change:    None apparent. Parenchyma: There is no significant volume loss. The brain parenchyma is otherwise within normal limits for age. Ventricles: The ventricles are within normal limits of size and configuration for age. Other:  The visualized paranasal sinuses are grossly clear. The skull and visualized extracranial soft tissues are grossly normal.      No acute findings, though MR is more sensitive for acute stroke evaluation. Results discussed with Dr. Gladys Jerez on 4/11/2022 at 1159 hours. CTA HEAD NECK W CONT    Result Date: 4/11/2022  EXAM: CTA HEAD NECK W CONT CLINICAL INDICATIONS: r/o cva, LVO. CODE S acute stroke suspected. Sudden onset blurred vision, recent diagnosis of SLE.  TECHNIQUE: Helical CT scan of the brain and neck were performed at 1.25 mm intervals during rapid IV bolus contrast administration. These data were reconstructed at .625 mm intervals for vascular analysis. The data was also reviewed at 2.5 mm intervals for accompanying soft tissue analysis. 3D post processed images, including surface shaded displays, were produced for this exam on independent console, permanently archived and interpreted. All CT scans at this facility are performed using dose optimization technique as appropriate to a performed exam, to include automated exposure control, adjustment of the MA and/or kV according to patient size (including appropriate matching for site-specific examinations) or use of  iterative reconstruction technique. CONTRAST: [facility]so[facility] 370 COMPARISON: No prior CTA. Head CT earlier same day 4/11/2022, 10/28/2009. Brain MRI 1/6/2007 CTA SOFT TISSUE ANALYSIS: Lungs: Mild paraseptal emphysema at the lung apices. Upper chest: There may be some secretions in the distal trachea. Neck: No neck mass. Lymph nodes: No adenopathy Orbits: Grossly normal for non-dedicated exam. Paranasal sinuses: No significant paranasal sinus disease. Brain: No hemorrhage or mass effect. Bones: No acute findings. Post surgical changes from previous cervical ACDF CTA NECK VASCULAR ANALYSIS: Aortic arch: No stenosis or aneurysm. Innominate: Patent Right Subclavian: Patent Left Subclavian: Patent Right carotid: -CCA: Patent -ECA: Patent -ICA: I will plaque, no significant stenosis. 0% stenosis of proximal ICA by NASCET criteria. Left carotid: -CCA: Patent -ECA: Patent -ICA: Mild plaque, no stenosis. 0% stenosis of proximal ICA by NASCET criteria. Right vertebral: Patent. No stenosis. Left vertebral: Patent. No stenosis. CTA BRAIN VASCULAR ANALYSIS: Right anterior circulation: -ICA: Patent -KIERRA: Patent -MCA: Patent Left anterior circulation: -ICA: Patent -KIERRA: Patent -MCA: The inferior MCA branch is dominant, patent.  Smaller superior division is patent, no focal stenosis. -ACOM: Unremarkable Posterior circulation: -RVA: Patent -LVA: Patent -Basilar: Patent -Right PCA: Patent -Left PCA: Patent Major dural venous sinuses: Unremarkable     1. Patent intracranial brain arteries. No LVO. 2. No significant stenosis in cervical carotid or vertebral arteries. Prelim to Dr. Krunal Diaz 12:17 pm 4/11/22     ECHO ADULT COMPLETE    Result Date: 4/12/2022    Left Ventricle: Left ventricle size is normal. Mildly increased wall thickness. Findings consistent with mild concentric hypertrophy. Normal wall motion. Normal left ventricular systolic function with a visually estimated EF of 55 - 60%. Normal diastolic function.   Mitral Valve: Mildly thickened leaflet. Mild to moderate transvalvular regurgitation with a posterior directed jet. MV EROA by continuity equation is 0.2 cm2.   Left Atrium: Left atrium is mildly dilated. Left atrial volume index is mildly increased (35-41 mL/m2). LA Vol Index A/L is 39 mL/m2.   Interatrial Septum: No interatrial shunt visualized with color Doppler. Grade 0 Absence of bubbles. Agitated saline study was negative with and without provocation. Procedures: None    Discharge Medications:     Current Discharge Medication List      CONTINUE these medications which have NOT CHANGED    Details   Symbicort 160-4.5 mcg/actuation HFAA       Repatha SureClick pen injection       meclizine (ANTIVERT) 12.5 mg tablet Take 12.5 mg by mouth every eight (8) hours as needed. rosuvastatin (CRESTOR) 20 mg tablet       levothyroxine (SYNTHROID) 125 mcg tablet Take 125 mcg by mouth daily. QUEtiapine (SEROquel) 100 mg tablet Take 100 mg by mouth nightly. predniSONE (STERAPRED DS) 10 mg dose pack Use as directed  Qty: 21 Tablet, Refills: 0      methocarbamoL (Robaxin) 500 mg tablet Take 1 Tablet by mouth four (4) times daily.   Qty: 16 Tablet, Refills: 0      venlafaxine-SR (EFFEXOR-XR) 150 mg capsule Take 1 Capsule by mouth daily (with breakfast). Qty: 30 Capsule, Refills: 0      traZODone (DESYREL) 100 mg tablet Take 1 Tablet by mouth nightly as needed for Sleep. Qty: 30 Tablet, Refills: 0      metFORMIN (GLUCOPHAGE) 500 mg tablet Take 1 Tablet by mouth two (2) times daily (with meals). Qty: 60 Tablet, Refills: 0      lamoTRIgine (LaMICtal) 200 mg tablet Take 1 Tablet by mouth two (2) times a day. Qty: 60 Tablet, Refills: 0      atorvastatin (LIPITOR) 40 mg tablet Take 1 Tablet by mouth daily. Qty: 30 Tablet, Refills: 0      aspirin 81 mg chewable tablet Take 1 Tablet by mouth daily. Qty: 30 Tablet, Refills: 0      lisinopriL (PRINIVIL, ZESTRIL) 5 mg tablet Take 1 Tablet by mouth daily. Indications: high blood pressure  Qty: 30 Tablet, Refills: 0      albuterol (PROVENTIL HFA, VENTOLIN HFA, PROAIR HFA) 90 mcg/actuation inhaler Take 1 Puff by inhalation every four (4) hours as needed for Wheezing.   Qty: 1 Inhaler, Refills: 3             Activity: PT/OT per Home Health    Diet: Resume previous diet    Wound Care: None needed    Follow-up: 1 week with PCP    Discharge time: >30 minutes spent coordinating this discharge    Marvin Mendoza PA-C  [id number]  Office:  [phone]  Pager: [phone]      4/13/2022, 10:14 AM

## 2022-04-13 NOTE — PROGRESS NOTES
Problem: Mobility Impaired (Adult and Pediatric)  Goal: *Acute Goals and Plan of Care (Insert Text)  Description: Physical Therapy Goals  Initiated 4/13/2022 and to be accomplished within 7 day(s)  1. Patient will move from supine to sit and sit to supine , scoot up and down, and roll side to side in bed with independence. 2.  Patient will transfer from bed to chair and chair to bed with modified independence using the least restrictive device. 3.  Patient will perform sit to stand with modified independence. 4.  Patient will ambulate with modified independence for 200 feet with the least restrictive device. 5.  Patient will ascend/descend 4 stairs with unilateral  handrail(s) with modified independence. PLOF: Pt lives in home with [employer] [facility], she has 3 roommates. She lives in 1 story house with 4 JUNIOR. Pt reports she has counselor support and [employer] employees but no PCA or [facility] aide support at home. She reports her roommates may be able to help her but they all have physical and mental problems. Outcome: Progressing Towards Goal     PHYSICAL THERAPY EVALUATION    Patient: Laurie Gayle (20 y.o. female)  Date: 4/13/2022  Primary Diagnosis: Dizziness [R42]        Precautions:  Fall,Aspiration    ASSESSMENT :  Pt cleared to participate in PT session, pt received sitting on BSC and agreeable to therapy session. Based on the objective data described below, the patient presents with decreased endurance, decreased strength, decreased balance reactions, gait deviations, increased dizziness, and decreased independence in functional mobility. Pt reporting she has been pivoting to [facility] independently in room. Pt standing with CGA from [facility], reaching for external support with pivot back to bed. Discussed need for AD, given RW for ambulation. Standing with SBA to RW and ambulating x30 feet out into maza with SBA. Pt then reporting dizziness and request to return to room.  Sitting on EOB with good sitting balance, retrieved vitals cart. Pt supine in bed upon return back to room, reporting fatigue. BP in supine 144/78, SPO2 at 95% on RA. Dizzy subsided with rest in supine. Pt positioned for comfort and educated to call for assist before getting up, pt verbalized understanding. Pt left with all needs met and call bell in reach. RN notified of position and participation. Pt would benefit from increased support at home due to pt reporting her roommates may not be much help. Pt refusing rehab. Recommending RW and HH PT with increased supervision and assist as needed at home. Patient will benefit from skilled intervention to address the above impairments. Patient's rehabilitation potential is considered to be Fair  Factors which may influence rehabilitation potential include:   []         None noted  []         Mental ability/status  [x]         Medical condition  [x]         Home/family situation and support systems  []         Safety awareness  []         Pain tolerance/management  []         Other:      PLAN :  Recommendations and Planned Interventions:   [x]           Bed Mobility Training             []    Neuromuscular Re-Education  [x]           Transfer Training                   []    Orthotic/Prosthetic Training  [x]           Gait Training                          []    Modalities  [x]           Therapeutic Exercises           []    Edema Management/Control  [x]           Therapeutic Activities            [x]    Family Training/Education  [x]           Patient Education  []           Other (comment):    Frequency/Duration: Patient will be followed by physical therapy 1-2 times per day/4-7 days per week to address goals. Discharge Recommendations:  HH with increased supervision/support at home vs Rehab (pt declining to go to rehab)  Further Equipment Recommendations for Discharge: rolling walker     SUBJECTIVE:   Patient stated I just want to go home.     OBJECTIVE DATA SUMMARY:     Past Medical History:   Diagnosis Date    Asthma, chronic, moderate persistent, with acute exacerbation     Asthmatic bronchitis without complication 76/3/3241    Bipolar 1 disorder, manic, mild (HCC)     CAD (coronary artery disease)     Controlled type 2 diabetes mellitus without complication, without long-term current use of insulin (HCC)     Diabetes ([facility] [address]     Hyperlipidemia     Hypertension     Hypothyroidism     Liver disease      Past Surgical History:   Procedure Laterality Date    HX APPENDECTOMY  10/1969    HX BACK SURGERY       neck& back surgery    HX CERVICAL LAMINECTOMY      HX CYST REMOVAL Left     Iglesias's    HX HYSTERECTOMY Bilateral 10/1990    HX KNEE ARTHROSCOPY Left     torn cartilage x 2     Barriers to Learning/Limitations: None  Compensate with: N/A  Home Situation:  Home Situation  Home Environment: [facility] Name: [employer]  # Steps to Enter: 4  Rails to Enter: Yes  Wheelchair Ramp: No  One/Two Story Residence: One story  Living Alone: Yes  Support Systems:  (roommate )  Patient Expects to be Discharged to[de-identified] Home  Current DME Used/Available at Home: Cane, straight  Critical Behavior:  Neurologic State: Alert  Orientation Level: Oriented X4  Cognition: Appropriate decision making  Safety/Judgement: Awareness of environment; Fall prevention  Psychosocial  Patient Behaviors: Calm; Cooperative    Strength:    Strength: Generally decreased, functional    Tone & Sensation:   Tone: Normal    Sensation: Intact    Range Of Motion:  AROM: Within functional limits    Posture:  Posture (WDL): Within defined limits     Functional Mobility:  Bed Mobility:     Supine to Sit:  (found sitting on BSC )  Sit to Supine: Modified independent  Scooting: Modified independent  Transfers:  Sit to Stand: Stand-by assistance  Stand to Sit: Stand-by assistance    Balance:   Sitting: Intact  Standing: Impaired; With support  Standing - Static: Good  Standing - Dynamic : Fair (+)    Ambulation/Gait Training:  Distance (ft): 60 Feet (ft)  Assistive Device: Walker, rolling  Ambulation - Level of Assistance: Stand-by assistance     Gait Description (WDL): Exceptions to WDL  Gait Abnormalities: Decreased step clearance    Base of Support: Center of gravity altered;Narrowed     Speed/Andree: Slow;Shuffled  Step Length: Left shortened;Right shortened    Pain:  Pain level pre-treatment: 0/10   Pain level post-treatment: 0/10     Activity Tolerance:   Fair tolerance     Please refer to the flowsheet for vital signs taken during this treatment. After treatment:   []         Patient left in no apparent distress sitting up in chair  [x]         Patient left in no apparent distress in bed  [x]         Call bell left within reach  [x]         Nursing notified  []         Caregiver present  []         Bed alarm activated  []         SCDs applied    COMMUNICATION/EDUCATION:   [x]         Role of Physical Therapy in the acute care setting. [x]         Fall prevention education was provided and the patient/caregiver indicated understanding. [x]         Patient/family have participated as able in goal setting and plan of care. [x]         Patient/family agree to work toward stated goals and plan of care. []         Patient understands intent and goals of therapy, but is neutral about his/her participation. []         Patient is unable to participate in goal setting/plan of care: ongoing with therapy staff.  []         Other:     Thank you for this referral.  Sarina Howell, PT   Time Calculation: 23 mins      Eval Complexity: History: MEDIUM  Complexity : 1-2 comorbidities / personal factors will impact the outcome/ POC Exam:LOW Complexity : 1-2 Standardized tests and measures addressing body structure, function, activity limitation and / or participation in recreation  Presentation: LOW Complexity : Stable, uncomplicated  Clinical Decision Making:Low Complexity low  Overall Complexity:LOW

## 2022-04-15 ENCOUNTER — HOME CARE VISIT (OUTPATIENT)
Dept: HOME HEALTH SERVICES | Facility: HOME HEALTH | Age: 62
End: 2022-04-15
Payer: MEDICARE

## 2022-04-15 ENCOUNTER — HOME CARE VISIT (OUTPATIENT)
Dept: SCHEDULING | Facility: HOME HEALTH | Age: 62
End: 2022-04-15
Payer: MEDICARE

## 2022-04-15 VITALS
TEMPERATURE: 98.2 F | HEART RATE: 70 BPM | DIASTOLIC BLOOD PRESSURE: 76 MMHG | SYSTOLIC BLOOD PRESSURE: 130 MMHG | OXYGEN SATURATION: 97 % | RESPIRATION RATE: 18 BRPM

## 2022-04-15 PROCEDURE — 400013 HH SOC

## 2022-04-15 PROCEDURE — G0299 HHS/HOSPICE OF RN EA 15 MIN: HCPCS

## 2022-04-15 NOTE — HOME HEALTH
Skilled Reason for admission/summary of clinical condition:  DM, HTN  This patient is homebound for the following reasons Requires considerable and taxing effort to leave the home  and Requires the assistance of 1 or more persons to leave the home   Caregiver: friend. Caregiver assists with adl, iadl, meds, transportation  Medications reconciled and all medications are available in the home this visit. The following education was provided regarding medications: sn instructed patient on aspirin, use as blood thinner and potential for unusual bleeding and bruising. Medications  are effective at this time. High risk medication teaching regarding anticoagulants, hyperglycemic agents or opioid narcotics performed (specify) metformin, dose and side effects  Notified of any discrepancies/look a like medications/medication interactions: NA  Home health supplies by type and quantity ordered/delivered this visit include: NA  Patient education provided this visit to include: Patient is a 49-year-old female who lives with friend who is primary caregiver. Past medical history: hypertension, hypothyroidism, diabetes mellitus type 2, coronary artery disease, bipolar disease, chronic persistent asthma. Patient with recent hospital stay for dizziness, headache blurred vision. patient weak, balance issues. patient using weiss at all times. patient on diabetic meds, does not check blood sugars. sn instructed patient and cg on s/s low blood sugar, importance of eating small frequent meals and compliance with diabetic diet. sn instructed patient to monitor diabetic feet daily for redness or open areas/  per cg, patent having issues with coughing after eating. ST molina requested.     Patient level of understanding of education provided: patient and cg verbalized full understanding of info provided  Sharps Education Provided: Clinician instructed patient/CG on proper disposal of sharps: Containers should be made of hard plastic, be puncture-resistant and leakproof,   such as a laundry detergent or bleach bottle.  When the container is ¾ full, it should be sealed with tape and labeled   DO NOT RECYCLE prior to discarding in the regular trash.    Patient response to procedure performed:  NA  Home exercise program/Homework provided: sn instructed patient to perform deep breathing exercises, 5-6 breaths 5 x daily to promote air exchange and prevent pneumonia   Pt/Caregiver instructed on plan of care and are agreeable to plan of care at this time. Physician dr Kennedy Shah notified of patient admission to home health and plan of care including anticipated frequency of 1w1, 2w1, 1w7 and treatments/interventions/modalities of PT/OT/ST  Discharge planning discussed with patient and caregiver. Discharge planning as follows: Discharge when patient independent with meds, improvised mobility and knowledge of disease process. Pt/Caregiver did verbalize understanding of discharge planning. Next MD appointment 4/18 with Dr Mckenna Loya. Patient/caregiver encouraged/instructed to keep appointment as lack of follow through with physician appointment could result in discontinuation of home care services for non-compliance.

## 2022-04-15 NOTE — Clinical Note
The frequency for SN will be 1W1, 2W1, 1W7 2 PRN   next scheduled visit for home health is on [address] for DM, HTN LUPUS  The patient's next MD appointment is on [facility] 4/18/22 with

## 2022-04-17 ENCOUNTER — HOME CARE VISIT (OUTPATIENT)
Dept: SCHEDULING | Facility: HOME HEALTH | Age: 62
End: 2022-04-17
Payer: MEDICARE

## 2022-04-17 VITALS
HEART RATE: 79 BPM | RESPIRATION RATE: 18 BRPM | SYSTOLIC BLOOD PRESSURE: 120 MMHG | OXYGEN SATURATION: 97 % | DIASTOLIC BLOOD PRESSURE: 80 MMHG | TEMPERATURE: 97.8 F

## 2022-04-17 PROCEDURE — G0151 HHCP-SERV OF PT,EA 15 MIN: HCPCS

## 2022-04-17 NOTE — Clinical Note
Therapy Functional Score Assessment  Question   Score   Grooming  1   Upper Dressing 1   Lower Dressing 1   Bathing 3   Toilet Transfer  1   Transfer  1           Ambulation  3   Dyspnea                     1   Pain Interfering with activity 0  Est number therapy visits      1  e

## 2022-04-17 NOTE — HOME HEALTH
Skilled services/Home bound verification:     Skilled Reason for admission/summary of clinical condition:  Ms. Carlos Garcia is a 57-year-old female with a past medical history significant for hypertension, hypothyroidism, diabetes mellitus type 2, coronary artery disease, bipolar disease, chronic persistent asthma who is presenting to the emergency department after developing dizziness. She tells me that she was sitting outside on her porch this morning when she had developed a left-sided headache blurred vision and dizziness. She was not doing anything in particular when this started. She denies any focal areas of weakness although she tells me that she is having a difficult time moving in general.  She states that she is unable to read and less she has her eyes closed. She has no chest pain shortness of breath cough nausea or vomiting. She has a no other recent changes in her health . No changes in her medication recently. Denies a history of migraine headaches. Denies ever having episodes of dizziness in the past.  Denies prior histories of seizures. She states that when she tries to get up to walk she feels like she will fall down. Work-up in the ER thus far has included WBCs 136/65, HR 86 temp 98.7. CT head and CTA neck with no evidence of intracranial hemorrhage patent cervical carotid and juve tebral arteries as well as intracranial circulation without any occlusion. Creatinine is at 1.33 which is her baseline, troponins 6. CBC without any acute changes compared to prior admissions. UA with moderate leukocyte esterase and 4 WBCs. Per review of old records she did have a UTI with group B strep on March 9.       4/12/22-Neurology note : MRI of the brain did not show any acute stroke; has shown mild parenchymal volum e loss and chronic small vessel changes. Echocardiography showed a normal ejection fraction with no shunt or thrombus.   .  This patient is homebound for the following reasons Only leaves the home for medical reasons or [employer] services and are infrequent and of short duration for other reasons . Caregiver: friend Minnie Liner. Caregiver assists with IADL's. Medications reconciled and all medications are available in the home this visit. Patient education provided this visit to include: fall prevention, HEP. dc     Patient level of understanding of education provided: Patient was able to 100% teach back proper technique with gait using [facility]     Sharps Education Provided: Clinician instructed patient/CG on proper disposal of sharps: Containers should be made of hard plastic, be puncture-resistant and leakproof,   such as a laundry detergent or bleach bottle.  When the container is ¾ full, it should be sealed with tape and labeled   DO NOT RECYCLE prior to discarding in the regular trash.      Patient response to procedure performed:  Patient needed verbal cues for HEP     Pt/Caregiver instructed on plan of care and are agreeable to plan of care at this time.     PMHx: Asthma, chronic, moderate persistent, with acute exacerbation      Asthmatic bronchitis without complication 37/0/9029    Bipolar 1 disorder, manic, mild ([address]      CAD (coronary artery disease)      Controlled type 2 diabetes mellitus without complication, without long-term current use of insulin (HCC)      Diabetes ([address]      Hyperlipidemia      Hypertension      Hypothyroidism      Liver disease              S: none noted   O:Patients Goals= Be able to go back to PLOF  Wound/Incision: location, description, drainage:   PLOF: Lives in a 1 level house, prior to referral, she was independent with ADL's and IADL's and did not use any AD for gait  STRENGTH B hip flexor, extensor, hip abductors adductors, B knee flexor and extensor 4/5  FTSTS 12 seconds  TUG 13 seconds using SPC   BALANCE  Tinetti 24/28   GAIT Patient ambulated with SPC x 100 feet with distant supervision with slow paced and forward sttoped posture. patient was educated with importance of maintaining upright posture and use of SPC at all times to prevent LOB and falls   BED MOBILITY independent with bed mobility   TRANSFERS Patient needed supervision with use of SPC to and from bed, chair and toilet   A: PT evaluation completed  P:Home Safety eval/recommendations: Home health physical therapy initial evaluation performed. Patient refused further PT at this time stating that she is doing her ADL's independently. Patient was educated with fall prevention by using AD at all times and maintaining clear mobility pathway. Patient also educated with activity pacing to prevent SOB during functional mobility.  Educated with heel to toe gait pattern using SPC to prevent falls and she was able to verbalize understanding

## 2022-04-19 ENCOUNTER — HOME CARE VISIT (OUTPATIENT)
Dept: HOME HEALTH SERVICES | Facility: HOME HEALTH | Age: 62
End: 2022-04-19
Payer: MEDICARE

## 2022-04-20 ENCOUNTER — HOME CARE VISIT (OUTPATIENT)
Dept: HOME HEALTH SERVICES | Facility: HOME HEALTH | Age: 62
End: 2022-04-20
Payer: MEDICARE

## 2022-04-20 ENCOUNTER — HOME CARE VISIT (OUTPATIENT)
Dept: SCHEDULING | Facility: HOME HEALTH | Age: 62
End: 2022-04-20
Payer: MEDICARE

## 2022-04-22 ENCOUNTER — HOME CARE VISIT (OUTPATIENT)
Dept: HOME HEALTH SERVICES | Facility: HOME HEALTH | Age: 62
End: 2022-04-22
Payer: MEDICARE

## 2022-04-25 ENCOUNTER — HOME CARE VISIT (OUTPATIENT)
Dept: HOME HEALTH SERVICES | Facility: HOME HEALTH | Age: 62
End: 2022-04-25
Payer: MEDICARE

## 2022-05-04 ENCOUNTER — HOSPITAL ENCOUNTER (OUTPATIENT)
Dept: LAB | Age: 62
Discharge: HOME | End: 2022-05-04

## 2022-05-04 LAB — XX-LABCORP SPECIMEN COL,LCBCF: NORMAL

## 2022-05-04 PROCEDURE — 99001 SPECIMEN HANDLING PT-LAB: CPT

## 2022-05-05 ENCOUNTER — HOME CARE VISIT (OUTPATIENT)
Dept: HOME HEALTH SERVICES | Facility: HOME HEALTH | Age: 62
End: 2022-05-05
Payer: MEDICARE

## 2022-05-06 NOTE — HOME HEALTH
Home health physical therapy initial evaluation performed. Patient refused further PT at this time stating that she is doing her ADL's independently. Patient was educated with fall prevention by using AD at all times and maintaining clear mobility pathway. Patient also educated with activity pacing to prevent SOB during functional mobility.  Educated with heel to toe gait pattern using SPC to prevent falls and she was able to verbalize understanding

## 2022-05-23 ENCOUNTER — VIRTUAL VISIT (OUTPATIENT)
Dept: ORTHOPEDIC SURGERY | Age: 62
End: 2022-05-23
Payer: MEDICARE

## 2022-05-23 DIAGNOSIS — G95.0 SYRINGOHYDROMYELIA (HCC): ICD-10-CM

## 2022-05-23 DIAGNOSIS — M43.22 FUSION OF SPINE, CERVICAL REGION: ICD-10-CM

## 2022-05-23 DIAGNOSIS — R53.1 WEAKNESS: ICD-10-CM

## 2022-05-23 DIAGNOSIS — M48.061 SPINAL STENOSIS OF LUMBAR REGION WITHOUT NEUROGENIC CLAUDICATION: Primary | ICD-10-CM

## 2022-05-23 DIAGNOSIS — G89.29 CHRONIC BILATERAL LOW BACK PAIN WITHOUT SCIATICA: ICD-10-CM

## 2022-05-23 DIAGNOSIS — M54.50 CHRONIC BILATERAL LOW BACK PAIN WITHOUT SCIATICA: ICD-10-CM

## 2022-05-23 DIAGNOSIS — M48.061 SPINAL STENOSIS OF LUMBAR REGION WITHOUT NEUROGENIC CLAUDICATION: ICD-10-CM

## 2022-05-23 PROCEDURE — 99442 PR PHYS/QHP TELEPHONE EVALUATION 11-20 MIN: CPT | Performed by: PHYSICAL MEDICINE & REHABILITATION

## 2022-05-23 RX ORDER — METHOCARBAMOL 750 MG/1
750 TABLET, FILM COATED ORAL
COMMUNITY
Start: 2022-04-18

## 2022-05-23 RX ORDER — ROSUVASTATIN CALCIUM 40 MG/1
40 TABLET, COATED ORAL DAILY
COMMUNITY
Start: 2022-04-18

## 2022-05-23 RX ORDER — HYDROXYZINE PAMOATE 25 MG/1
CAPSULE ORAL
COMMUNITY
Start: 2022-04-29

## 2022-05-23 NOTE — PROGRESS NOTES
[address]  [address], [address]  [facility], [address]  Phone: [phone]  Fax: [phone]      Patient: Bonnie Benítez                                                                              MRN: [id number]        YOB: 1960          AGE: 64 y.o. PCP: Katharine Mullins NP  Date:  05/23/22    Reason for Consultation: Back Pain      HPI:  Bonnie Benítez is a 64 y.o. female with relevant PMH of HTN, DM, bipolar, cervical cancer over 15 years ago- resection no chemo or radiation, prior cervical spine surgery C5-7 ACDF (done in [place]),  stage 3 kidney disease who was referred here by Dr. Walker Avalos for severe spinal stenosis demonstrated on recent lumbar spine MRI. She reports 5 year history of low back pain which has progressively worsened. 9/2020 she was admitted to [facility] after a fall waking down stairs. At the time she had some right sided weakness and an MRI of the brain was done which showed left midbrain decreased attenuation concerning for acute to subacute stroke and old lacunar strokes. MRI cervical spine with prior surgery but no evidence of central spinal stenosis. 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 She was supposed to get an MRI of her lumbar spine demonstrated multilevel degenerative changes, epidural lipomatosis, severe spinal stenosis at L4/5 with anterior disc space and endplate edema. She was seen by Dr Cesar Strange 4/26/2021 and referred to try an epidural injection. She did not want to try an KEN (reports she has had in the past without relief) and did not follow up.    4/11/2022 while waiting for a ride to take her to a doctors appointment she reports dizziness and weakness in her arms and legs. She was taken to the ED at [facility] and had CT head and CTA neck as well as MRI brain which were negative for acute CVA, Echo with normal EF.  She was seen by Neurology and told to follow up with NSGY as some of her symptoms may be related to severe stenosis in lumbar spine. She was discharged home 4/13/22 with home PT and OT and rolling walker. She had one session of PT but declined further sessions. She does have follow up with neurology 6/13/22,       Neurologic symptoms: +numbness left leg when walking. . Reports bladder urgency and stress incontinence. No falls since recent hospitalization 4/11/22. Ambulates with rolling walker    Location: The pain is located in the mid and low back pain  Radiation: The pain does not radiate . Pain Score: Currently: 7/10   Quality: Pain is of a Tight quality. Aggravating: Pain is exacerbated by walking, standing and exercise  Alleviating: The pain is alleviated by sitting    Prior Treatments:   KEN in the past -no relief    Previous Medications: NA  Current Medications:NA  Previous work-up has included:   MRI thoracic spine 9/2010  1. 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 No prominent cord distention. 3-6 month follow-up examination might be considered. 2. Disc protrusions consistent with herniated discs at T6-T7 and T8-T9 as described above. There is very mild cord distortion at T8-T9 without cord signal change, this would not with complete confidence correlate with a facet myelopathy. 3. No additional high-grade stenosis, intrinsic spinal cord lesion or mass, no additional abnormality that would otherwise correlate with a thoracic myelopathy. MRI cervical spine 9/2020  Motion degraded exam without acute findings. 1.  C5-C7 ACDF construct. 2.  C4-C5 low-grade junctional level central stenosis. 3.  Multilevel foraminal stenosis. 4.  Degenerative facet arthropathy, greatest at left C3-C4. 5.  Advanced atlantodental joint degenerative arthropathy.       MRI lumbar spine 3/9/21    L2-L3: Small posterior central disc protrusion with associated annular fissure  and background minimal disc bulge. Moderate facet arthropathy with ligamentum  flavum bulge. Spinal canal patent. Minimal foraminal narrowing.     L3-L4: Mild left foraminal disc protrusion with associated annular fissure and  background mild disc bulge. Mild to moderate facet arthropathy. Mild thecal sac  stenosis due to combination of degenerative change and epidural lipomatosis with  mild abutment of the crossing L4 nerves. Mild bilateral foraminal stenoses.     L4-L5: Mild anterior disc space edema with posterior central disc protrusion  with annular fissure, and background mild disc/osteophyte bulge. Moderate facet  arthropathy with ligamentum flavum bulge. Severe thecal sac stenosis due to  combination of degenerative changes, epidural lipomatosis, and congenital  narrowing. Moderate right and mild left foraminal stenoses with abutment of the  exiting L4 nerves, right more than left.   -Anterior disc space edema and notable endplate edema at this level is favored  to represent active degenerative disc disease. Early discitis/osteomyelitis less  likely but clinical correlation advised. L5-S1: Minimal disc bulge. Moderate facet arthropathy. Mild thecal sac stenosis  largely due to congenital narrowing and epidural lipomatosis.  Foramina patent.     Past Medical History:   Past Medical History:   Diagnosis Date    Asthma, chronic, moderate persistent, with acute exacerbation     Asthmatic bronchitis without complication 97/3/8099    Bipolar 1 disorder, manic, mild ([address]     CAD (coronary artery disease)     Controlled type 2 diabetes mellitus without complication, without long-term current use of insulin (HCC)     Diabetes ([address]     Hyperlipidemia     Hypertension     Hypothyroidism     Liver disease       Past Surgical History:   Past Surgical History:   Procedure Laterality Date    HX APPENDECTOMY  10/1969    HX BACK SURGERY       neck& back surgery    HX CERVICAL LAMINECTOMY      HX CYST REMOVAL Left Baker's    HX HYSTERECTOMY Bilateral 10/1990    HX KNEE ARTHROSCOPY Left     torn cartilage x 2      SocHx:   Social History     Tobacco Use    Smoking status: Current Every Day Smoker     Packs/day: 1.00     Years: 50.00     Pack years: 50.00     Types: Cigarettes     Start date: 6/17/1974    Smokeless tobacco: Never Used   Substance Use Topics    Alcohol use: Never      FamHx:? Family History   Problem Relation Age of Onset    Cancer Mother     Heart Disease Father        Current Medications:    Current Outpatient Medications   Medication Sig Dispense Refill    levothyroxine (SYNTHROID) 125 mcg tablet Take 125 mcg by mouth daily.  QUEtiapine (SEROquel) 100 mg tablet Take 100 mg by mouth nightly.  venlafaxine-SR (EFFEXOR-XR) 150 mg capsule Take 1 Capsule by mouth daily (with breakfast). 30 Capsule 0    metFORMIN (GLUCOPHAGE) 500 mg tablet Take 1 Tablet by mouth two (2) times daily (with meals). 60 Tablet 0    lamoTRIgine (LaMICtal) 200 mg tablet Take 1 Tablet by mouth two (2) times a day. 60 Tablet 0    aspirin 81 mg chewable tablet Take 1 Tablet by mouth daily. 30 Tablet 0    lisinopriL (PRINIVIL, ZESTRIL) 5 mg tablet Take 1 Tablet by mouth daily. Indications: high blood pressure 30 Tablet 0    hydrOXYzine pamoate (VISTARIL) 25 mg capsule       methocarbamoL (ROBAXIN) 750 mg tablet Take 750 mg by mouth every six to eight (6-8) hours as needed.  rosuvastatin (CRESTOR) 40 mg tablet Take 40 mg by mouth daily.  evolocumab (REPATHA SURECLICK) pen injection 100 mg by SubCUTAneous route every fourteen (14) days.  acetaminophen (TYLENOL) 500 mg capsule Take 500 mg by mouth every six (6) hours as needed for Pain.  Symbicort 160-4.5 mcg/actuation HFAA Take 1 Puff by inhalation daily.  traZODone (DESYREL) 100 mg tablet Take 1 Tablet by mouth nightly as needed for Sleep.  30 Tablet 0    albuterol (PROVENTIL HFA, VENTOLIN HFA, PROAIR HFA) 90 mcg/actuation inhaler Take 1 Puff by inhalation every four (4) hours as needed for Wheezing. 1 Inhaler 3      Allergies: Allergies   Allergen Reactions    Codeine Nausea and Vomiting and Nausea Only    Other Medication Other (comments)     Nicotine Patches - Serious \"burn like\" irritation on skin    Oxycodone-Acetaminophen Nausea and Vomiting, Nausea Only and Other (comments)     Pt can take tylenol  Pt can take tylenol      Carvedilol Other (comments)    Citalopram Unknown (comments) and Other (comments)     violent    Nicotine Other (comments)     Nicotine patches  Other reaction(s): toxic skin reaction  Nicotine patches      Tramadol Nausea Only and Other (comments)    Flu Vaccine Qs2014-15(36mo,Up) Nausea Only      Data review    Reviewed hospital course 4/11/22-4/13/22       Assessment:   61year old female with h/o DM, bipolar, prior CVA, progressive gait instability, and chronic low back pain which as been worsening. Lumbar spine with severe spinal stenosis L4-5, prior cervical fusion C5-7, thoracic T8 syrinohydromelia. 1. L4/5 severe spinal stenosis  2. L4/5 disc space and endplate edema r/o discitis/osteomyelitis  3. Thoracic T8 syringohydromyelia  4. Prior cervical fusion C5-7 (15 years ago)  5. DM    Plan:      -will get new imaging to evaluate cervical , thoracic and lumbar spine for progression of spinal stenosis, syringohydromelia- in case this the etiology for her recent episode of wekaness  -MRI cervical, thoracic, and lumbar spine  -On discharge it was recommended she follow up with NSGY, she states she would prefer not to see Dr. Emmy Alexander - we will get imaging and determine who she can be seen by         I was at home while conducting this encounter. Patient not in the office    Consent:  She and/or her healthcare decision maker is aware that this patient-initiated Telehealth encounter is a billable service, with coverage as determined by her insurance carrier.  She is aware that she may receive a bill and has provided verbal consent to proceed: Yes    This virtual visit was conducted telephone encounter only. -  I affirm this is a Patient Initiated Episode with an Established Patient who has not had a related appointment within my department in the past 7 days or scheduled within the next 24 hours. Note: this encounter is not billable if this call serves to triage the patient into an appointment for the relevant concern. Total Time: minutes: 11-20 minutes.       ?

## 2022-06-17 ENCOUNTER — TELEPHONE (OUTPATIENT)
Dept: ORTHOPEDIC SURGERY | Age: 62
End: 2022-06-17

## 2022-06-17 NOTE — TELEPHONE ENCOUNTER
Chava Garg called to inform of patient's 3 orders. ..2 orders are pending for peer to peer. ..1st order id# [id number] for the Cervical Spine. ..2nd order id# [id number] is for the Lumbar Spine and the 3rd order for the Thoracic Spine was approved.      For peer to peer contact - [phone]

## 2022-06-21 NOTE — TELEPHONE ENCOUNTER
MRI cervical spine was approved  - [id number]- valid 6/17-8/15/22   Mri thoracic spine approved    Lumbar spine was denied

## 2022-06-22 NOTE — TELEPHONE ENCOUNTER
No contact information was obtained for Irasema Elpidio so I cannot forward this information. She will be able to retrieve it from insurance website.

## 2022-07-11 ENCOUNTER — HOSPITAL ENCOUNTER (OUTPATIENT)
Dept: PHYSICAL THERAPY | Age: 62
Discharge: HOME | End: 2022-07-11
Payer: MEDICARE

## 2022-07-11 PROCEDURE — 97166 OT EVAL MOD COMPLEX 45 MIN: CPT

## 2022-07-11 NOTE — PROGRESS NOTES
[facility]  [address] [place] [address]  [phone] fax    Plan of Care/Statement of Necessity /DISCHARGE SUMMARY for Occupational Therapy Services    Patient name: Angela Villar Start of Care: 2022   Referral source: Clive Hampton NP    : 1960    Medical Diagnosis: Other low back pain [M54.59]  Payor: Marisol Engle / Plan: Binh Hernández 8141 HMO / Product Type: Managed Care Medicare /  Onset Date:    Treatment Diagnosis: Difficulty walking   Prior Hospitalization: see medical history Provider#: [id number]   Medications: Verified on Patient summary List    Comorbidities: MI, TIAs, DM, HTN, cervical fusion, disorder of the spinal cord, asthma   Prior Level of Function: Declining mobility with frequent falls due to lower extremity weakness          The Plan of Care and following information is based on the information from the initial evaluation. Assessment/ key information: 64year old RHD female with complex medical history including cardiovascular, respiratory, neurological and orthopedic disorders resulting in difficulty walking and frequent falls. She currently uses a quad cane or rolling walker but her legs give way suddenly resulting in falls. She is no longer safe in ambulation. She has had multiple TIAs as well as cervical spine surgery which has resulted in weakness in lower extremities of 3-/5 and upper extremities of 3-/5. Her extremities give way with overpressure. She cannot use a manual wheelchair due to history of cervical spine surgery, chronic low back pain of 7/10 and decreased upper extremity strength as well as asthma. She requires a scooter to move through her home to get to bathroom in timely manner for toileting, to bathe and to gather items for dressing as well as to prepare meals.   Without a scooter she will continue to have falls at home resulting in further injuries and will be unable to bathe and toilet herself safely and complete meal preparation. Evaluation Complexity: History MEDIUM Complexity : Expanded review of history including physical, cognitive and psychosocial  history  Examination MEDIUM Complexity : 3-5 performance deficits relating to physical, cognitive , or psychosocial skils that result in activity limitations and / or participation restrictions Clinical Decision Making MEDIUM Complexity : Patient may present with comorbidities that affect occupational performnce. Miniml to moderate modification of tasks or assistance (eg, physical or verbal ) with assesment(s) is necessary to enable patient to complete evaluation   Overall Complexity Rating: MEDIUM    Problem List: Pain effecting function, Decreased strength, Decreased ADL/functional abilities , Decreased activity tolerance and Sensability     Treatment Plan may include any combination of the following: Patient education    Patient / Family readiness to learn indicated by: asking questions, trying to perform skills and interest    Persons(s) to be included in education:   patient (P)    Barriers to Learning/Limitations: None    Patient Goal (s): Get a scooter to keep me from falling    Patient Self Reported Health Status: fair    Rehabilitation Potential: good    Short Term Goals: To be accomplished in 1 treatments:  1. Evaluate patient for need for power mobility and make recommendations      Frequency / Duration: Patient to be seen 1 times per week for 1 treatments:    Patient/ Caregiver education and instruction: Diagnosis, prognosis, other order process  [x]  Plan of care has been reviewed with ELKINS      Status at discharge: goals met    Certification Period: 7/11/22-8/9/22    JENNIFER Cardona/L 7/11/2022 10:23 AM      ________________________________________________________________________    I certify that the above Therapy Services are being furnished while the patient is under my care.  I agree with the treatment plan and certify that this therapy is necessary.     [de-identified] Signature:____________Date:_________TIME:________     Myriam Batch, NP  ** Signature, Date and Time must be completed for valid certification **    Please sign and return to [facility]   [address] [facility]  [phone] fax

## 2022-07-11 NOTE — PROGRESS NOTES
OT DAILY TREATMENT NOTE     Patient Name: Mickey Law  Date:2022  : 1960  [x]  Patient  Verified  Payor: Beatris  / Plan: Binh Hernández 8141 HMO / Product Type: Managed Care Medicare /    In time:1030  Out time:1100  Total Treatment Time (min): 30  Visit #: 1 of 1    Medicare/BCBS Only   Total Timed Codes (min):  0 1:1 Treatment Time:  30     Treatment Area: Other low back pain [M54.59]    SUBJECTIVE  Pain Level (0-10 scale): 7/10  Any medication changes, allergies to medications, adverse drug reactions, diagnosis change, or new procedure performed?: [x] No    [] Yes (see summary sheet for update)  Subjective functional status/changes:   [] No changes reported  BACK HURTS ALL THE TIME  Left leg gives way.   I want the information sent to Dr. Audrey Blair    30 min [x]Eval                  []Re-Eval          Patient seen for mobility eval only  With   [] TE   [] TA   [] neuro   [] other: Patient Education: [x] Review HEP    [] Progressed/Changed HEP based on: order process  [] positioning   [] body mechanics   [] transfers   [] heat/ice application   [] Splint wear/care   [] Sensory re-education   [] scar management      [] other:            Other Objective/Functional Measures:   See mobility eval     Pain Level (0-10 scale) post treatment: 7/10     [x]  See Plan of Care  []  See progress note/recertification  [x]  See Discharge Summary       PLAN  []  Upgrade activities as tolerated     []  Continue plan of care  []  Update interventions per flow sheet       [x]  Discharge due to:mobility eval only_  []  Other:_      Stacy Gomez, OTR/L 2022  10:22 AM    Future Appointments   Date Time Provider Bentley Alcazar   2022 10:30 AM Sharon Tom OTR/L [id number]   7/15/2022  2:30 PM [facility] MRI RM 1 [facility]   7/15/2022  3:15 PM [facility] MRI RM 1 [facility]

## 2022-07-14 ENCOUNTER — TELEPHONE (OUTPATIENT)
Dept: ORTHOPEDIC SURGERY | Age: 62
End: 2022-07-14

## 2022-07-14 DIAGNOSIS — G95.0 SYRINGOHYDROMYELIA (HCC): Primary | ICD-10-CM

## 2022-07-14 DIAGNOSIS — M48.061 SPINAL STENOSIS OF LUMBAR REGION WITHOUT NEUROGENIC CLAUDICATION: ICD-10-CM

## 2022-07-14 DIAGNOSIS — R53.1 WEAKNESS: ICD-10-CM

## 2022-07-14 DIAGNOSIS — G95.0 SYRINGOHYDROMYELIA (HCC): ICD-10-CM

## 2022-07-14 NOTE — TELEPHONE ENCOUNTER
Angélica from [employer] called stating the patient is scheduled for lumbar and cervical MRIs on 7/15, and stated these procedures were previously cancelled by Dr. Jose Cruz Frey. Angélica wanted to confirm if these MRIs are needed. Angélica is requesting a call back and can be reached at [phone].

## 2022-07-14 NOTE — TELEPHONE ENCOUNTER
Returned her call and left VM for Shelbina- MRI cervical and thoracic spine are approved and should not have been cancelled, not sure who cancelled it.   Lumbar spine MRI was denied

## 2022-07-14 NOTE — TELEPHONE ENCOUNTER
[facility] authorization [id number] and left voice mail for Catarina ho of the providers message. I then called the scheduling department/authorization [phone] and spoke to [facility]. I also informed him of the message per the provider. He stated he did not see the MRI Thoracic spine because it had been closed out due to the patient no showing. I put in a new order for the MRI Thoracic spine. I then called the scheduling department again to see if the patient could have the MRI Cervical and Thoracic done tomorrow since the MRI Lumbar spine has been denied. I was put on hold to verify if could be done. I was then informed that it has been changed to the MRI Cervical fist and then the MRI Thoracic spine to be done tomorrow. No further action is needed at this time.

## 2022-07-15 ENCOUNTER — HOSPITAL ENCOUNTER (OUTPATIENT)
Dept: LAB | Age: 62
Discharge: HOME | End: 2022-07-15

## 2022-07-15 ENCOUNTER — HOSPITAL ENCOUNTER (OUTPATIENT)
Dept: MRI IMAGING | Age: 62
Discharge: HOME | End: 2022-07-15
Attending: PHYSICAL MEDICINE & REHABILITATION
Payer: MEDICARE

## 2022-07-15 ENCOUNTER — APPOINTMENT (OUTPATIENT)
Dept: MRI IMAGING | Age: 62
End: 2022-07-15
Attending: PHYSICAL MEDICINE & REHABILITATION
Payer: MEDICARE

## 2022-07-15 LAB — XX-LABCORP SPECIMEN COL,LCBCF: NORMAL

## 2022-07-15 PROCEDURE — 72146 MRI CHEST SPINE W/O DYE: CPT

## 2022-07-15 PROCEDURE — 99001 SPECIMEN HANDLING PT-LAB: CPT

## 2022-07-15 PROCEDURE — 72141 MRI NECK SPINE W/O DYE: CPT

## 2022-08-04 ENCOUNTER — TELEPHONE (OUTPATIENT)
Dept: ORTHOPEDIC SURGERY | Age: 62
End: 2022-08-04

## 2022-11-23 ENCOUNTER — HOSPITAL ENCOUNTER (OUTPATIENT)
Dept: LAB | Age: 62
Discharge: HOME | End: 2022-11-23

## 2022-11-23 LAB — XX-LABCORP SPECIMEN COL,LCBCF: NORMAL

## 2022-11-23 PROCEDURE — 99001 SPECIMEN HANDLING PT-LAB: CPT

## 2023-04-07 ENCOUNTER — HOSPITAL ENCOUNTER (OUTPATIENT)
Facility: HOSPITAL | Age: 63
End: 2023-04-07
Payer: MEDICARE

## 2023-04-07 DIAGNOSIS — I51.9 MYXEDEMA HEART DISEASE: ICD-10-CM

## 2023-04-07 DIAGNOSIS — E03.9 MYXEDEMA HEART DISEASE: ICD-10-CM

## 2023-04-07 DIAGNOSIS — I10 ESSENTIAL HYPERTENSION, MALIGNANT: ICD-10-CM

## 2023-04-07 DIAGNOSIS — E78.5 DYSLIPIDEMIA: ICD-10-CM

## 2023-04-07 DIAGNOSIS — K76.9 HEPATIC LESION: ICD-10-CM

## 2023-04-07 LAB
ALBUMIN SERPL-MCNC: 3.7 G/DL (ref 3.4–5)
ALBUMIN/GLOB SERPL: 1.2 (ref 0.8–1.7)
ALP SERPL-CCNC: 130 U/L (ref 45–117)
ALT SERPL-CCNC: 23 U/L (ref 13–56)
ANION GAP SERPL CALC-SCNC: 6 MMOL/L (ref 3–18)
AST SERPL-CCNC: 11 U/L (ref 10–38)
BASOPHILS # BLD: 0 K/UL (ref 0–0.1)
BASOPHILS NFR BLD: 1 % (ref 0–2)
BILIRUB SERPL-MCNC: 0.3 MG/DL (ref 0.2–1)
BUN SERPL-MCNC: 21 MG/DL (ref 7–18)
BUN/CREAT SERPL: 17 (ref 12–20)
CALCIUM SERPL-MCNC: 9.7 MG/DL (ref 8.5–10.1)
CHLORIDE SERPL-SCNC: 105 MMOL/L (ref 100–111)
CHOLEST SERPL-MCNC: 162 MG/DL
CO2 SERPL-SCNC: 29 MMOL/L (ref 21–32)
CREAT SERPL-MCNC: 1.25 MG/DL (ref 0.6–1.3)
CREAT UR-MCNC: 1.4 MG/DL (ref 0.6–1.3)
DIFFERENTIAL METHOD BLD: ABNORMAL
EOSINOPHIL # BLD: 0.1 K/UL (ref 0–0.4)
EOSINOPHIL NFR BLD: 1 % (ref 0–5)
ERYTHROCYTE [DISTWIDTH] IN BLOOD BY AUTOMATED COUNT: 13.3 % (ref 11.6–14.5)
GLOBULIN SER CALC-MCNC: 3 G/DL (ref 2–4)
GLUCOSE SERPL-MCNC: 152 MG/DL (ref 74–99)
HCT VFR BLD AUTO: 41.8 % (ref 35–45)
HDLC SERPL-MCNC: 46 MG/DL (ref 40–60)
HDLC SERPL: 3.5 (ref 0–5)
HGB BLD-MCNC: 13.6 G/DL (ref 12–16)
IMM GRANULOCYTES # BLD AUTO: 0 K/UL (ref 0–0.04)
IMM GRANULOCYTES NFR BLD AUTO: 0 % (ref 0–0.5)
LDLC SERPL CALC-MCNC: 40.6 MG/DL (ref 0–100)
LIPID PANEL: ABNORMAL
LYMPHOCYTES # BLD: 2.1 K/UL (ref 0.9–3.6)
LYMPHOCYTES NFR BLD: 30 % (ref 21–52)
MCH RBC QN AUTO: 29.2 PG (ref 24–34)
MCHC RBC AUTO-ENTMCNC: 32.5 G/DL (ref 31–37)
MCV RBC AUTO: 89.9 FL (ref 78–100)
MONOCYTES # BLD: 0.5 K/UL (ref 0.05–1.2)
MONOCYTES NFR BLD: 8 % (ref 3–10)
NEUTS SEG # BLD: 4.3 K/UL (ref 1.8–8)
NEUTS SEG NFR BLD: 60 % (ref 40–73)
NRBC # BLD: 0 K/UL (ref 0–0.01)
NRBC BLD-RTO: 0 PER 100 WBC
PLATELET # BLD AUTO: 239 K/UL (ref 135–420)
PMV BLD AUTO: 11.9 FL (ref 9.2–11.8)
POTASSIUM SERPL-SCNC: 4.7 MMOL/L (ref 3.5–5.5)
PROT SERPL-MCNC: 6.7 G/DL (ref 6.4–8.2)
RBC # BLD AUTO: 4.65 M/UL (ref 4.2–5.3)
SODIUM SERPL-SCNC: 140 MMOL/L (ref 136–145)
T4 FREE SERPL-MCNC: 1.3 NG/DL (ref 0.7–1.5)
TRIGL SERPL-MCNC: 377 MG/DL
TSH SERPL DL<=0.05 MIU/L-ACNC: 0.02 UIU/ML (ref 0.36–3.74)
VLDLC SERPL CALC-MCNC: 75.4 MG/DL
WBC # BLD AUTO: 7.1 K/UL (ref 4.6–13.2)

## 2023-04-07 PROCEDURE — 84439 ASSAY OF FREE THYROXINE: CPT

## 2023-04-07 PROCEDURE — 80053 COMPREHEN METABOLIC PANEL: CPT

## 2023-04-07 PROCEDURE — 36415 COLL VENOUS BLD VENIPUNCTURE: CPT

## 2023-04-07 PROCEDURE — 85025 COMPLETE CBC W/AUTO DIFF WBC: CPT

## 2023-04-07 PROCEDURE — A9577 INJ MULTIHANCE: HCPCS | Performed by: NURSE PRACTITIONER

## 2023-04-07 PROCEDURE — 84443 ASSAY THYROID STIM HORMONE: CPT

## 2023-04-07 PROCEDURE — 6360000004 HC RX CONTRAST MEDICATION: Performed by: NURSE PRACTITIONER

## 2023-04-07 PROCEDURE — 80061 LIPID PANEL: CPT

## 2023-04-07 PROCEDURE — 74183 MRI ABD W/O CNTR FLWD CNTR: CPT

## 2023-04-07 RX ADMIN — GADOBENATE DIMEGLUMINE 17 ML: 529 INJECTION, SOLUTION INTRAVENOUS at 08:07

## 2023-07-07 ENCOUNTER — HOSPITAL ENCOUNTER (OUTPATIENT)
Facility: HOSPITAL | Age: 63
Discharge: HOME | End: 2023-07-07
Payer: MEDICARE

## 2023-07-07 DIAGNOSIS — R01.1 HEART MURMUR, SYSTOLIC: ICD-10-CM

## 2023-07-07 LAB
ECHO AO ROOT DIAM: 2.6 CM
ECHO AV AREA PEAK VELOCITY: 1.7 CM2
ECHO AV AREA VTI: 1.8 CM2
ECHO AV MEAN GRADIENT: 8 MMHG
ECHO AV MEAN VELOCITY: 1.3 M/S
ECHO AV PEAK GRADIENT: 13 MMHG
ECHO AV PEAK VELOCITY: 1.8 M/S
ECHO AV VELOCITY RATIO: 0.61
ECHO AV VTI: 35.4 CM
ECHO LA VOL 2C: 9 ML (ref 22–52)
ECHO LA VOL 4C: 8 ML (ref 22–52)
ECHO LA VOL BP: 9 ML (ref 22–52)
ECHO LA VOLUME AREA LENGTH: 9 ML
ECHO LV E' LATERAL VELOCITY: 11 CM/S
ECHO LV E' SEPTAL VELOCITY: 9 CM/S
ECHO LV FRACTIONAL SHORTENING: 34 % (ref 28–44)
ECHO LV INTERNAL DIMENSION DIASTOLIC: 3.5 CM (ref 3.9–5.3)
ECHO LV INTERNAL DIMENSION SYSTOLIC: 2.3 CM
ECHO LV IVSD: 1.2 CM (ref 0.6–0.9)
ECHO LV MASS 2D: 144.6 G (ref 67–162)
ECHO LV POSTERIOR WALL DIASTOLIC: 1.3 CM (ref 0.6–0.9)
ECHO LV RELATIVE WALL THICKNESS RATIO: 0.74
ECHO LVOT AREA: 2.8 CM2
ECHO LVOT AV VTI INDEX: 0.62
ECHO LVOT DIAM: 1.9 CM
ECHO LVOT MEAN GRADIENT: 3 MMHG
ECHO LVOT PEAK GRADIENT: 5 MMHG
ECHO LVOT PEAK VELOCITY: 1.1 M/S
ECHO LVOT SV: 62.6 ML
ECHO LVOT VTI: 22.1 CM
ECHO MV A VELOCITY: 0.66 M/S
ECHO MV E DECELERATION TIME (DT): 310.6 MS
ECHO MV E VELOCITY: 0.51 M/S
ECHO MV E/A RATIO: 0.77
ECHO MV E/E' LATERAL: 4.64
ECHO MV E/E' RATIO (AVERAGED): 5.15
ECHO MV E/E' SEPTAL: 5.67
ECHO PV MAX VELOCITY: 1 M/S
ECHO PV PEAK GRADIENT: 4 MMHG
ECHO RV FREE WALL PEAK S': 8 CM/S
ECHO RV TAPSE: 1.5 CM (ref 1.7–?)
ECHO TV REGURGITANT MAX VELOCITY: 1.89 M/S
ECHO TV REGURGITANT PEAK GRADIENT: 14 MMHG

## 2023-07-07 PROCEDURE — 93306 TTE W/DOPPLER COMPLETE: CPT

## 2024-01-12 ENCOUNTER — HOSPITAL ENCOUNTER (EMERGENCY)
Facility: HOSPITAL | Age: 64
Discharge: HOME | End: 2024-01-12
Payer: MEDICARE

## 2024-01-12 ENCOUNTER — APPOINTMENT (OUTPATIENT)
Facility: HOSPITAL | Age: 64
End: 2024-01-12
Payer: MEDICARE

## 2024-01-12 VITALS
TEMPERATURE: 97.9 F | WEIGHT: 170 LBS | RESPIRATION RATE: 18 BRPM | OXYGEN SATURATION: 95 % | BODY MASS INDEX: 28.32 KG/M2 | DIASTOLIC BLOOD PRESSURE: 83 MMHG | SYSTOLIC BLOOD PRESSURE: 162 MMHG | HEIGHT: 65 IN | HEART RATE: 89 BPM

## 2024-01-12 DIAGNOSIS — U07.1 COVID-19: Primary | ICD-10-CM

## 2024-01-12 LAB
ALBUMIN SERPL-MCNC: 4 G/DL (ref 3.4–5)
ALBUMIN/GLOB SERPL: 1.1 (ref 0.8–1.7)
ALP SERPL-CCNC: 126 U/L (ref 45–117)
ALT SERPL-CCNC: 36 U/L (ref 13–56)
ANION GAP SERPL CALC-SCNC: 5 MMOL/L (ref 3–18)
AST SERPL-CCNC: 30 U/L (ref 10–38)
BASOPHILS # BLD: 0.1 K/UL (ref 0–0.1)
BASOPHILS NFR BLD: 1 % (ref 0–2)
BILIRUB SERPL-MCNC: 0.4 MG/DL (ref 0.2–1)
BUN SERPL-MCNC: 12 MG/DL (ref 7–18)
BUN/CREAT SERPL: 11 (ref 12–20)
CALCIUM SERPL-MCNC: 9.1 MG/DL (ref 8.5–10.1)
CHLORIDE SERPL-SCNC: 109 MMOL/L (ref 100–111)
CO2 SERPL-SCNC: 29 MMOL/L (ref 21–32)
CREAT SERPL-MCNC: 1.08 MG/DL (ref 0.6–1.3)
DIFFERENTIAL METHOD BLD: ABNORMAL
EOSINOPHIL # BLD: 0.1 K/UL (ref 0–0.4)
EOSINOPHIL NFR BLD: 2 % (ref 0–5)
ERYTHROCYTE [DISTWIDTH] IN BLOOD BY AUTOMATED COUNT: 14.5 % (ref 11.6–14.5)
FLUAV RNA SPEC QL NAA+PROBE: NOT DETECTED
FLUBV RNA SPEC QL NAA+PROBE: NOT DETECTED
GLOBULIN SER CALC-MCNC: 3.8 G/DL (ref 2–4)
GLUCOSE SERPL-MCNC: 155 MG/DL (ref 74–99)
HCT VFR BLD AUTO: 46.6 % (ref 35–45)
HGB BLD-MCNC: 15.3 G/DL (ref 12–16)
IMM GRANULOCYTES # BLD AUTO: 0 K/UL (ref 0–0.04)
IMM GRANULOCYTES NFR BLD AUTO: 0 % (ref 0–0.5)
LIPASE SERPL-CCNC: 80 U/L (ref 13–75)
LYMPHOCYTES # BLD: 1.9 K/UL (ref 0.9–3.6)
LYMPHOCYTES NFR BLD: 33 % (ref 21–52)
MCH RBC QN AUTO: 27.9 PG (ref 24–34)
MCHC RBC AUTO-ENTMCNC: 32.8 G/DL (ref 31–37)
MCV RBC AUTO: 84.9 FL (ref 78–100)
MONOCYTES # BLD: 0.4 K/UL (ref 0.05–1.2)
MONOCYTES NFR BLD: 7 % (ref 3–10)
NEUTS SEG # BLD: 3.2 K/UL (ref 1.8–8)
NEUTS SEG NFR BLD: 57 % (ref 40–73)
NRBC # BLD: 0 K/UL (ref 0–0.01)
NRBC BLD-RTO: 0 PER 100 WBC
PLATELET # BLD AUTO: 238 K/UL (ref 135–420)
PMV BLD AUTO: 11.7 FL (ref 9.2–11.8)
POTASSIUM SERPL-SCNC: 3.7 MMOL/L (ref 3.5–5.5)
PROT SERPL-MCNC: 7.8 G/DL (ref 6.4–8.2)
RBC # BLD AUTO: 5.49 M/UL (ref 4.2–5.3)
SARS-COV-2 RNA RESP QL NAA+PROBE: DETECTED
SODIUM SERPL-SCNC: 143 MMOL/L (ref 136–145)
WBC # BLD AUTO: 5.7 K/UL (ref 4.6–13.2)

## 2024-01-12 PROCEDURE — 85025 COMPLETE CBC W/AUTO DIFF WBC: CPT

## 2024-01-12 PROCEDURE — 71046 X-RAY EXAM CHEST 2 VIEWS: CPT

## 2024-01-12 PROCEDURE — 83690 ASSAY OF LIPASE: CPT

## 2024-01-12 PROCEDURE — 87636 SARSCOV2 & INF A&B AMP PRB: CPT

## 2024-01-12 PROCEDURE — 80053 COMPREHEN METABOLIC PANEL: CPT

## 2024-01-12 PROCEDURE — 99284 EMERGENCY DEPT VISIT MOD MDM: CPT

## 2024-01-12 ASSESSMENT — PAIN - FUNCTIONAL ASSESSMENT: PAIN_FUNCTIONAL_ASSESSMENT: NONE - DENIES PAIN

## 2024-01-12 NOTE — ED PROVIDER NOTES
EMERGENCY DEPARTMENT HISTORY AND PHYSICAL EXAM      Patient Name: Bryce Roman  MRN: [id number]  YOB: 1960  Provider: Tracy Sauceda PA-C  PCP: Mandie Mei APRN - NP   Time/Date of evaluation: 9:20 AM EST on 1/12/24    History of Presenting Illness     Chief Complaint   Patient presents with    Cough       History Provided By: Patient     History (Narative):   Bryce Roman is a 63 y.o. female  coronary artery disease, diabetes, hyperlipidemia, hypertension and hypothyroidism who presents to the emergency department  by POV C/O of multiple complaints.    Patient states that she has been having a persistent cough that is been ongoing for the past 4 years.  Patient states that she came to the emergency room because it was affecting her quality of life.  Patient denies any hemoptysis, any sputum production, any fever, chills recent travel, history of HIV, or autoimmune disorder.  Patient does endorse smoking a pack of cigarettes every 5 to 6 days.     Patient also is complaining of intermittent diarrhea since November 25.  Patient states that she will have an episode every 3 to 4 days, denies any melena, recent antibiotic use, or recent travel.  Patient states that she has had a prior appendectomy.          Past History     Past Medical History:  Past Medical History:   Diagnosis Date    Asthma, chronic, moderate persistent, with acute exacerbation     Asthmatic bronchitis without complication 10/6/2020    Bipolar 1 disorder, manic, mild (HCC)     CAD (coronary artery disease)     Controlled type 2 diabetes mellitus without complication, without long-term current use of insulin (HCC)     Diabetes (HCC)     Hyperlipidemia     Hypertension     Hypothyroidism     Liver disease        Past Surgical History:  Past Surgical History:   Procedure Laterality Date    APPENDECTOMY  10/1969    BACK SURGERY       neck& back surgery    CERVICAL LAMINECTOMY      CYST REMOVAL Left     Khan's    HYSTERECTOMY (CERVIX  (H) 74 - 99 mg/dL    BUN 12 7.0 - 18 MG/DL    Creatinine 1.08 0.6 - 1.3 MG/DL    Bun/Cre Ratio 11 (L) 12 - 20      Est, Glom Filt Rate 58 (L) >60 ml/min/1.73m2    Calcium 9.1 8.5 - 10.1 MG/DL    Total Bilirubin 0.4 0.2 - 1.0 MG/DL    ALT 36 13 - 56 U/L    AST 30 10 - 38 U/L    Alk Phosphatase 126 (H) 45 - 117 U/L    Total Protein 7.8 6.4 - 8.2 g/dL    Albumin 4.0 3.4 - 5.0 g/dL    Globulin 3.8 2.0 - 4.0 g/dL    Albumin/Globulin Ratio 1.1 0.8 - 1.7     Lipase    Collection Time: 01/12/24  9:25 AM   Result Value Ref Range    Lipase 80 (H) 13 - 75 U/L   COVID-19 & Influenza Combo    Collection Time: 01/12/24  9:25 AM    Specimen: Nasopharyngeal   Result Value Ref Range    SARS-CoV-2, PCR Detected (A) NOTD      Rapid Influenza A By PCR Not detected NOTD      Rapid Influenza B By PCR Not detected NOTD         Radiologic Studies:   XR CHEST (2 VW)   Final Result      No active cardiopulmonary disease.          EKG interpretation: (Preliminary)      Procedures     Procedures    ED Course     9:20 AM KAN CALVILLO (Tracy Chiang PA-C) am the first provider for this patient. Initial assessment performed. I reviewed the vital signs, available nursing notes, past medical history, past surgical history, family history and social history. The patients presenting problems have been discussed, and they are in agreement with the care plan formulated and outlined with them.  I have encouraged them to ask questions as they arise throughout their visit.    Records Reviewed: Nursing Notes and Old Medical Records    Is this patient to be included in the SEP-1 core measure? No Exclusion criteria - the patient is NOT to be included for SEP-1 Core Measure due to: 2+ SIRS criteria are not met    MEDICATIONS ADMINISTERED IN THE ED:  Medications - No data to display         Medical Decision Making         63-year-old female presented ambulatory to the ED for multiple complaints.  Patient's initial complaint was persistent cough for the past 4

## 2024-01-12 NOTE — ED TRIAGE NOTES
Pt presents in a motorized wc for c/o of cough since before Thanksgiving. Pt not actively coughing and noted with cigarettes in basket.

## 2024-02-19 ENCOUNTER — APPOINTMENT (OUTPATIENT)
Facility: HOSPITAL | Age: 64
End: 2024-02-19
Payer: MEDICARE

## 2024-02-19 ENCOUNTER — HOSPITAL ENCOUNTER (EMERGENCY)
Facility: HOSPITAL | Age: 64
Discharge: HOME | End: 2024-02-19
Payer: MEDICARE

## 2024-02-19 ENCOUNTER — HOSPITAL ENCOUNTER (OUTPATIENT)
Facility: HOSPITAL | Age: 64
Discharge: HOME | End: 2024-02-22
Payer: MEDICARE

## 2024-02-19 VITALS
HEIGHT: 65 IN | HEART RATE: 78 BPM | WEIGHT: 160 LBS | RESPIRATION RATE: 18 BRPM | TEMPERATURE: 98.1 F | DIASTOLIC BLOOD PRESSURE: 81 MMHG | SYSTOLIC BLOOD PRESSURE: 156 MMHG | OXYGEN SATURATION: 95 % | BODY MASS INDEX: 26.66 KG/M2

## 2024-02-19 VITALS — WEIGHT: 160 LBS | HEIGHT: 65 IN | BODY MASS INDEX: 26.66 KG/M2

## 2024-02-19 DIAGNOSIS — S89.92XA INJURY OF LEFT KNEE, INITIAL ENCOUNTER: Primary | ICD-10-CM

## 2024-02-19 DIAGNOSIS — F17.210 CIGARETTE SMOKER: ICD-10-CM

## 2024-02-19 PROCEDURE — 73562 X-RAY EXAM OF KNEE 3: CPT

## 2024-02-19 PROCEDURE — 99283 EMERGENCY DEPT VISIT LOW MDM: CPT

## 2024-02-19 PROCEDURE — 71271 CT THORAX LUNG CANCER SCR C-: CPT

## 2024-02-19 ASSESSMENT — PAIN - FUNCTIONAL ASSESSMENT: PAIN_FUNCTIONAL_ASSESSMENT: 0-10

## 2024-02-19 ASSESSMENT — ENCOUNTER SYMPTOMS
GASTROINTESTINAL NEGATIVE: 1
COLOR CHANGE: 0
ALLERGIC/IMMUNOLOGIC NEGATIVE: 1
EYES NEGATIVE: 1
RESPIRATORY NEGATIVE: 1

## 2024-02-19 NOTE — ED PROVIDER NOTES
Types: Cigarettes     Start date: 6/17/1974    Smokeless tobacco: Never   Substance and Sexual Activity    Alcohol use: Never    Drug use: Not Currently     Types: Marijuana (Weed)       SCREENINGS         Kintyre Coma Scale  Eye Opening: Spontaneous  Best Verbal Response: Oriented  Best Motor Response: Obeys commands  Sage Coma Scale Score: 15                     CIWA Assessment  BP: (!) 156/81  Pulse: 78                 PHYSICAL EXAM       ED Triage Vitals [02/19/24 1629]   BP Temp Temp src Pulse Respirations SpO2 Height Weight - Scale   (!) 156/81 98.1 °F (36.7 °C) -- 78 18 95 % 1.651 m (5' 5\") 72.6 kg (160 lb)       Physical Exam  Constitutional:       General: She is not in acute distress.     Appearance: Normal appearance. She is normal weight. She is not toxic-appearing.   HENT:      Head: Normocephalic.      Nose: Nose normal.      Mouth/Throat:      Mouth: Mucous membranes are moist.   Eyes:      Extraocular Movements: Extraocular movements intact.   Cardiovascular:      Rate and Rhythm: Normal rate and regular rhythm.      Pulses: Normal pulses.      Heart sounds: Normal heart sounds.   Pulmonary:      Effort: Pulmonary effort is normal.      Breath sounds: Normal breath sounds.   Abdominal:      General: Abdomen is flat.      Tenderness: There is no abdominal tenderness.   Musculoskeletal:         General: Tenderness and signs of injury present. No deformity.      Cervical back: Normal range of motion and neck supple. No rigidity.   Skin:     General: Skin is warm.   Neurological:      Mental Status: She is alert and oriented to person, place, and time.   Psychiatric:         Mood and Affect: Mood normal.         Behavior: Behavior normal.         Thought Content: Thought content normal.         Judgment: Judgment normal.         DIAGNOSTIC RESULTS     RADIOLOGY:   Non-plain film images such as CT, Ultrasound and MRI are read by the radiologist. Plain radiographic images are visualized and

## 2024-02-19 NOTE — ED TRIAGE NOTES
PATIENT PRESENTED TO THE EMERGENCY DEPT WITH A COMPLAINT OF LEFT KNEE INJURY. LEFT KNEE WAS CAUGHT BETWEEN  DOOR FRAME, NOW HAVING PAIN, REDNESS AND SWELLING TO SITE. INJURY HAPPENED ON WEDNESDAY      PATIENT ALERT AND ORIENTED X 4, PATIENT BREATHES FREELY ON ROOM AIR IN NIL CARDIOPULMOANRY DISTRESS

## 2024-10-28 ENCOUNTER — TRANSCRIBE ORDERS (OUTPATIENT)
Facility: HOSPITAL | Age: 64
End: 2024-10-28

## 2024-10-28 DIAGNOSIS — Z12.31 ENCOUNTER FOR SCREENING MAMMOGRAM FOR HIGH-RISK PATIENT: Primary | ICD-10-CM

## 2024-11-14 ENCOUNTER — HOSPITAL ENCOUNTER (OUTPATIENT)
Facility: HOSPITAL | Age: 64
Discharge: HOME | End: 2024-11-17
Payer: MEDICARE

## 2024-11-14 VITALS — BODY MASS INDEX: 25.71 KG/M2 | HEIGHT: 66 IN | WEIGHT: 160 LBS

## 2024-11-14 DIAGNOSIS — Z12.31 ENCOUNTER FOR SCREENING MAMMOGRAM FOR HIGH-RISK PATIENT: ICD-10-CM

## 2024-11-14 PROCEDURE — 77063 BREAST TOMOSYNTHESIS BI: CPT

## 2025-05-02 ENCOUNTER — TRANSCRIBE ORDERS (OUTPATIENT)
Facility: HOSPITAL | Age: 65
End: 2025-05-02

## 2025-05-02 ENCOUNTER — HOSPITAL ENCOUNTER (OUTPATIENT)
Facility: HOSPITAL | Age: 65
Discharge: HOME | End: 2025-05-02
Payer: MEDICARE

## 2025-05-02 DIAGNOSIS — M79.672 LEFT FOOT PAIN: Primary | ICD-10-CM

## 2025-05-02 DIAGNOSIS — M79.672 LEFT FOOT PAIN: ICD-10-CM

## 2025-05-02 PROCEDURE — 73630 X-RAY EXAM OF FOOT: CPT
